# Patient Record
Sex: FEMALE | Race: WHITE | NOT HISPANIC OR LATINO | Employment: OTHER | URBAN - METROPOLITAN AREA
[De-identification: names, ages, dates, MRNs, and addresses within clinical notes are randomized per-mention and may not be internally consistent; named-entity substitution may affect disease eponyms.]

---

## 2019-04-13 PROBLEM — G45.9 TRANSIENT ISCHEMIC ATTACK: Status: ACTIVE | Noted: 2019-04-13

## 2019-04-13 PROBLEM — G43.109 COMPLICATED MIGRAINE: Status: ACTIVE | Noted: 2019-04-13

## 2019-09-19 ENCOUNTER — OFFICE VISIT (OUTPATIENT)
Dept: ORTHOPEDICS | Facility: CLINIC | Age: 64
End: 2019-09-19
Payer: COMMERCIAL

## 2019-09-19 VITALS
HEIGHT: 62 IN | SYSTOLIC BLOOD PRESSURE: 124 MMHG | BODY MASS INDEX: 28.52 KG/M2 | DIASTOLIC BLOOD PRESSURE: 60 MMHG | WEIGHT: 155 LBS | HEART RATE: 75 BPM

## 2019-09-19 DIAGNOSIS — M25.561 ACUTE PAIN OF RIGHT KNEE: ICD-10-CM

## 2019-09-19 DIAGNOSIS — M22.41 CHONDROMALACIA OF RIGHT PATELLA: Primary | ICD-10-CM

## 2019-09-19 PROCEDURE — 20610 DRAIN/INJ JOINT/BURSA W/O US: CPT | Mod: RT,S$GLB,, | Performed by: ORTHOPAEDIC SURGERY

## 2019-09-19 PROCEDURE — 99203 PR OFFICE/OUTPT VISIT, NEW, LEVL III, 30-44 MIN: ICD-10-PCS | Mod: 25,S$GLB,, | Performed by: ORTHOPAEDIC SURGERY

## 2019-09-19 PROCEDURE — 20610 LARGE JOINT ASPIRATION/INJECTION: R KNEE: ICD-10-PCS | Mod: RT,S$GLB,, | Performed by: ORTHOPAEDIC SURGERY

## 2019-09-19 PROCEDURE — 99203 OFFICE O/P NEW LOW 30 MIN: CPT | Mod: 25,S$GLB,, | Performed by: ORTHOPAEDIC SURGERY

## 2019-09-19 RX ORDER — ASPIRIN 81 MG/1
81 TABLET ORAL DAILY
COMMUNITY
End: 2022-06-02

## 2019-09-19 RX ORDER — METHYLPREDNISOLONE ACETATE 40 MG/ML
40 INJECTION, SUSPENSION INTRA-ARTICULAR; INTRALESIONAL; INTRAMUSCULAR; SOFT TISSUE
Status: DISCONTINUED | OUTPATIENT
Start: 2019-09-19 | End: 2019-09-19 | Stop reason: HOSPADM

## 2019-09-19 RX ORDER — EPINEPHRINE 0.22MG
100 AEROSOL WITH ADAPTER (ML) INHALATION DAILY
COMMUNITY
End: 2022-06-02

## 2019-09-19 RX ADMIN — METHYLPREDNISOLONE ACETATE 40 MG: 40 INJECTION, SUSPENSION INTRA-ARTICULAR; INTRALESIONAL; INTRAMUSCULAR; SOFT TISSUE at 02:09

## 2019-09-19 NOTE — PROGRESS NOTES
Formerly [facility]    Subjective:     Chief Complaint:   Chief Complaint   Patient presents with    Right Knee - Pain     Right knee pain x a while. States that her knee feel like to wants to give out on her and has sharp pains depending on how she moves her legs, pain also is off and on and does bother her to walk up stairs. Would like to get an injection.        Past Medical History:   Diagnosis Date    Allergy        Past Surgical History:   Procedure Laterality Date    TONSILLECTOMY      TUBAL LIGATION         Current Outpatient Medications   Medication Sig    aspirin (ECOTRIN) 81 MG EC tablet Take 81 mg by mouth once daily.    coenzyme Q10 (CO Q-10) 100 mg capsule Take 100 mg by mouth once daily.    docosahexanoic acid/epa (FISH OIL ORAL) Take by mouth.     No current facility-administered medications for this visit.        Review of patient's allergies indicates:   Allergen Reactions    Pcn [penicillins] Hives and Nausea Only       Family History   Problem Relation Age of Onset    Cancer Mother 42        breast cancer    Heart disease Father         quadrouple bypass    Heart disease Maternal Grandmother     COPD Maternal Grandfather     Heart disease Paternal Grandmother        Social History     Socioeconomic History    Marital status: Single     Spouse name: Not on file    Number of children: Not on file    Years of education: Not on file    Highest education level: Not on file   Occupational History    Not on file   Social Needs    Financial resource strain: Not on file    Food insecurity:     Worry: Not on file     Inability: Not on file    Transportation needs:     Medical: Not on file     Non-medical: Not on file   Tobacco Use    Smoking status: Never Smoker    Smokeless tobacco: Never Used   Substance and Sexual Activity    Alcohol use: Yes     Comment: 2 glasses of red wine daily    Drug use: No    Sexual activity: Not on file   Lifestyle    Physical activity:     Days  per week: Not on file     Minutes per session: Not on file    Stress: Not on file   Relationships    Social connections:     Talks on phone: Not on file     Gets together: Not on file     Attends Jew service: Not on file     Active member of club or organization: Not on file     Attends meetings of clubs or organizations: Not on file     Relationship status: Not on file   Other Topics Concern    Not on file   Social History Narrative    Not on file       History of present illness: Patient comes in today for the right knee.. She's had right knee pain for about a month. She's not sure exactly how or why it started.      Review of Systems:    Constitution: Negative for chills, fever, and sweats.  Negative for unexplained weight loss.    HENT:  Negative for headaches and blurry vision.    Cardiovascular:Negative for chest pain or irregular heart beat. Negative for hypertension.    Respiratory:  Negative for cough and shortness of breath.    Gastrointestinal: Negative for abdominal pain, heartburn, melena, nausea, and vomitting.    Genitourinary:  Negative bladder incontinence and dysuria.    Musculoskeletal:  See HPI for details.     Neurological: Negative for numbness.    Psychiatric/Behavioral: Negative for depression.  The patient is not nervous/anxious.      Endocrine: Negative for polyuria    Hematologic/Lymphatic: Negative for bleeding problem.  Does not bruise/bleed easily.    Skin: Negative for poor would healing and rash    Objective:      Physical Examination:    Vital Signs:    Vitals:    09/19/19 1314   BP: 124/60   Pulse: 75       Body mass index is 28.35 kg/m².    This a well-developed, well nourished patient in no acute distress.  They are alert and oriented and cooperative to examination.        Patient has anterior crepitus in the right side. She is 1+ effusion. Her knee is stable to varus valgus anterior posterior stresses.  Pertinent New Results:    XRAY Report / Interpretation:   AP  lateral and sunrise views of the right knee demonstrate moderate to severe patellofemoral arthrosis. No fractures or subluxations    Assessment/Plan:      Right knee  arthritis patellofemoral. I injected her with Depo-Medrol and lidocaine. She will follow-up in 6 weeks      This note was created using Dragon voice recognition software that occasionally misinterpreted phrases or words.

## 2019-09-19 NOTE — PROCEDURES
Large Joint Aspiration/Injection: R knee  Date/Time: 9/19/2019 2:05 PM  Performed by: Pasha Ontiveros MD  Authorized by: Pasha Ontiveros MD     Consent Done?:  Yes (Verbal)  Indications:  Pain  Procedure site marked: Yes    Timeout: Prior to procedure the correct patient, procedure, and site was verified    Anesthesia  Local anesthesia used  Anesthetic: lidocaine 1% without epinephrine    Location:  Knee  Site:  R knee  Prep: Patient was prepped and draped in usual sterile fashion    Needle size:  25 G  Medications:  40 mg methylPREDNISolone acetate 40 mg/mL; 40 mg methylPREDNISolone acetate 40 mg/mL  Patient tolerance:  Patient tolerated the procedure well with no immediate complications

## 2019-10-02 ENCOUNTER — IMMUNIZATION (OUTPATIENT)
Dept: URGENT CARE | Facility: CLINIC | Age: 64
End: 2019-10-02
Payer: COMMERCIAL

## 2019-10-02 PROCEDURE — 90686 IIV4 VACC NO PRSV 0.5 ML IM: CPT | Mod: S$GLB,,, | Performed by: EMERGENCY MEDICINE

## 2019-10-02 PROCEDURE — 90471 IMMUNIZATION ADMIN: CPT | Mod: S$GLB,,, | Performed by: EMERGENCY MEDICINE

## 2019-10-02 PROCEDURE — 90686 PR FLU VACCINE, QIIV4, NO PRSV, 0.5 ML, IM: ICD-10-PCS | Mod: S$GLB,,, | Performed by: EMERGENCY MEDICINE

## 2019-10-02 PROCEDURE — 90471 PR IMMUNIZ ADMIN,1 SINGLE/COMB VAC/TOXOID: ICD-10-PCS | Mod: S$GLB,,, | Performed by: EMERGENCY MEDICINE

## 2019-11-18 ENCOUNTER — OFFICE VISIT (OUTPATIENT)
Dept: FAMILY MEDICINE | Facility: CLINIC | Age: 64
End: 2019-11-18
Payer: COMMERCIAL

## 2019-11-18 VITALS
BODY MASS INDEX: 27.49 KG/M2 | WEIGHT: 161 LBS | HEIGHT: 64 IN | HEART RATE: 80 BPM | DIASTOLIC BLOOD PRESSURE: 82 MMHG | SYSTOLIC BLOOD PRESSURE: 138 MMHG

## 2019-11-18 DIAGNOSIS — G43.109 COMPLICATED MIGRAINE: ICD-10-CM

## 2019-11-18 DIAGNOSIS — Z12.31 OTHER SCREENING MAMMOGRAM: ICD-10-CM

## 2019-11-18 DIAGNOSIS — M17.11 PRIMARY OSTEOARTHRITIS OF RIGHT KNEE: ICD-10-CM

## 2019-11-18 DIAGNOSIS — F51.01 PRIMARY INSOMNIA: Primary | ICD-10-CM

## 2019-11-18 PROBLEM — M19.91 PRIMARY OSTEOARTHRITIS: Status: ACTIVE | Noted: 2018-05-07

## 2019-11-18 PROCEDURE — 99214 OFFICE O/P EST MOD 30 MIN: CPT | Mod: S$GLB,,, | Performed by: FAMILY MEDICINE

## 2019-11-18 PROCEDURE — 99214 PR OFFICE/OUTPT VISIT, EST, LEVL IV, 30-39 MIN: ICD-10-PCS | Mod: S$GLB,,, | Performed by: FAMILY MEDICINE

## 2019-11-18 RX ORDER — CLONAZEPAM 0.5 MG/1
0.5 TABLET ORAL NIGHTLY
Qty: 30 TABLET | Refills: 0 | Status: SHIPPED | OUTPATIENT
Start: 2019-11-18 | End: 2020-01-13 | Stop reason: SDUPTHER

## 2019-11-18 NOTE — PROGRESS NOTES
SUBJECTIVE:    Patient ID: Elle Hernandez is a 64 y.o. female.    Chief Complaint: Follow-up (did not bring bottles, not on meds -ac)    64 year old female presents for routine exam. Pt recently went to the ER in April for floaters in vision and slurred speech. Workup was negative. F/u with neurology Dr. Minaya determined to most likely be complicated migraine. She also reports experiencing pains in her lower legs after starting Crestor. She has been off of Crestor for several months and is still having leg pain. She c/o having difficulty with sleep. otc meds have helped her fall asleep, but not stay asleep. She deals with dry mouth at night and her  also keeps her awake with frequently night time coughing. She is requesting something stronger to help her sleep.  Has recently started new exercises classes and tries to go about 3-4 times per week.      Hospital Outpatient Visit on 07/12/2019   Component Date Value Ref Range Status    LVIDS 07/12/2019 2.76  2.1 - 4.0 cm Final    Ascending aorta 07/12/2019 2.8  cm Final    STJ 07/12/2019 3.08  cm Final    AV mean gradient 07/12/2019 3  mmHg Final    Ao peak davis 07/12/2019 1.24  m/s Final    Ao VTI 07/12/2019 25.5  cm Final    IVS 07/12/2019 0.903  0.6 - 1.1 cm Final    LA size 07/12/2019 3.2  cm Final    LVIDD 07/12/2019 4.32  3.5 - 6.0 cm Final    LVOT diameter 07/12/2019 2.0  cm Final    LVOT peak VTI 07/12/2019 22.80  cm Final    PW 07/12/2019 0.980  0.6 - 1.1 cm Final    MV Peak A Davis 07/12/2019 0.67  m/s Final    E wave decelartion time 07/12/2019 197  msec Final    RA Major Axis 07/12/2019 3.38  cm Final    RA Width 07/12/2019 2.89  cm Final    TR Max Davis 07/12/2019 2.08  m/s Final    RVDD 07/12/2019 2.28  cm Final    LA WIDTH 07/12/2019 3.05  cm Final    PV PEAK VELOCITY 07/12/2019 100  cm/s Final    LVOT peak davis 07/12/2019 97.00  m/s Final    BSA 07/12/2019 1.83  m2 Final    TDI SEPTAL 07/12/2019 0.10  m/s Final    LV  LATERAL E/E' RATIO 07/12/2019 6.50  m/s Final    LV SEPTAL E/E' RATIO 07/12/2019 7.80  m/s Final    TDI LATERAL 07/12/2019 0.12  m/s Final    FS 07/12/2019 36  28 - 44 % Final    LA volume 07/12/2019 34.36  cm3 Final    LV mass 07/12/2019 132.10  g Final    Left Ventricle Relative Wall Thick* 07/12/2019 0.45  cm Final    AV valve area 07/12/2019 2.81  cm2 Final    AV Velocity Ratio 07/12/2019 78.23   Final    AV index (prosthetic) 07/12/2019 0.89   Final    E/A ratio 07/12/2019 1.16   Final    Mean e' 07/12/2019 0.11  m/s Final    LVOT area 07/12/2019 3.1  cm2 Final    LVOT stroke volume 07/12/2019 71.59  cm3 Final    AV peak gradient 07/12/2019 6  mmHg Final    E/E' ratio 07/12/2019 7.09  m/s Final    MV Peak E Serafin 07/12/2019 0.78  m/s Final    LA Volume Index 07/12/2019 19.4  mL/m2 Final    LV Mass Index 07/12/2019 74  g/m2 Final    Left Atrium Minor Axis 07/12/2019 4.58  cm Final    Left Atrium Major Axis 07/12/2019 3.78  cm Final    Triscuspid Valve Regurgitation Pea* 07/12/2019 17  mmHg Final    MV valve area p 1/2 method 07/12/2019 3.19  cm2 Final    MV stenosis pressure 1/2 time 07/12/2019 69  ms Final    Right Atrial Pressure (from IVC) 07/12/2019 3  mmHg Final    TV rest pulmonary artery pressure 07/12/2019 20  mmHg Final       Past Medical History:   Diagnosis Date    Allergy      Past Surgical History:   Procedure Laterality Date    TONSILLECTOMY      TUBAL LIGATION       Family History   Problem Relation Age of Onset    Cancer Mother 42        breast cancer    Heart disease Father         quadrouple bypass    Heart disease Maternal Grandmother     COPD Maternal Grandfather     Heart disease Paternal Grandmother        Marital Status: Single  Alcohol History:  reports that she drinks alcohol.  Tobacco History:  reports that she is a non-smoker but has been exposed to tobacco smoke. She has never used smokeless tobacco.  Drug History:  reports that she does not use  drugs.    Review of patient's allergies indicates:   Allergen Reactions    Pcn [penicillins] Hives and Nausea Only       Current Outpatient Medications:     aspirin (ECOTRIN) 81 MG EC tablet, Take 81 mg by mouth once daily., Disp: , Rfl:     coenzyme Q10 (CO Q-10) 100 mg capsule, Take 100 mg by mouth once daily., Disp: , Rfl:     docosahexanoic acid/epa (FISH OIL ORAL), Take by mouth., Disp: , Rfl:     Review of Systems   Constitutional: Negative for appetite change, chills, fatigue, fever and unexpected weight change.   HENT: Negative for congestion, ear pain, sinus pain, sore throat and trouble swallowing.    Eyes: Negative for pain, discharge and visual disturbance.   Respiratory: Negative for apnea, cough, shortness of breath and wheezing.    Cardiovascular: Negative for chest pain, palpitations and leg swelling.   Gastrointestinal: Negative for abdominal pain, blood in stool, constipation, diarrhea, nausea and vomiting.   Endocrine: Negative for heat intolerance, polydipsia and polyuria.   Genitourinary: Negative for difficulty urinating, dyspareunia, dysuria, frequency, hematuria and menstrual problem.   Musculoskeletal: Positive for arthralgias (R knee pain. Steroid injection by Dr. Ontiveros in Sept has helped.). Negative for back pain, gait problem, joint swelling and myalgias.        Reports intermittent pains in lower legs and feet.    Allergic/Immunologic: Negative for environmental allergies, food allergies and immunocompromised state.   Neurological: Negative for dizziness, tremors, seizures, numbness and headaches.        No more migraines since episode in April of this yr   Psychiatric/Behavioral: Positive for sleep disturbance (wakes up frequently throughout the night. Thinks it is due to  coughing frequently at night. ). Negative for behavioral problems, confusion, hallucinations and suicidal ideas. The patient is not nervous/anxious.           Objective:      Vitals:    11/18/19 1443   BP:  "138/82   Pulse: 80   Weight: 73 kg (161 lb)   Height: 5' 4" (1.626 m)     Body mass index is 27.64 kg/m².  Physical Exam   Constitutional: She is oriented to person, place, and time. She appears well-developed and well-nourished.   HENT:   Head: Normocephalic and atraumatic.   Right Ear: External ear normal.   Left Ear: External ear normal.   Nose: Nose normal.   Mouth/Throat: Oropharynx is clear and moist.   Eyes: Pupils are equal, round, and reactive to light. EOM are normal.   Neck: Normal range of motion. Neck supple. Carotid bruit is not present. No thyromegaly present.   Cardiovascular: Normal rate, regular rhythm, normal heart sounds and intact distal pulses.   No murmur heard.  Pulmonary/Chest: Effort normal and breath sounds normal. She has no wheezes. She has no rales.   Abdominal: Soft. Bowel sounds are normal. She exhibits no distension. There is no hepatosplenomegaly. There is no tenderness.   Musculoskeletal: Normal range of motion. She exhibits no tenderness or deformity.        Lumbar back: Normal. She exhibits no pain and no spasm.   Bends 90 degrees at  waist   Lymphadenopathy:     She has no cervical adenopathy.   Neurological: She is alert and oriented to person, place, and time. No cranial nerve deficit. Coordination normal.   Skin: Skin is warm and dry. No rash noted.   Psychiatric: She has a normal mood and affect. Her behavior is normal. Judgment and thought content normal.   Nursing note and vitals reviewed.        Assessment:       1. Primary insomnia    2. Complicated migraine    3. Other screening mammogram    4. Primary osteoarthritis of right knee         Plan:       Primary insomnia  - Clonazepam 0.5mg nightly at bedtime.    Complicated migraine  - Saw Dr. Minaya. No more episodes.    Other screening mammogram  -     Mammo Digital Screening Bilat without CA; Future    Primary osteoarthritis of right knee  - Seeing Dr. Ontiveros. Steroid injection in September doing well.    Follow up in " about 6 months (around 5/18/2020).

## 2020-01-13 DIAGNOSIS — F51.01 PRIMARY INSOMNIA: ICD-10-CM

## 2020-01-13 RX ORDER — CLONAZEPAM 0.5 MG/1
0.5 TABLET ORAL NIGHTLY
Qty: 30 TABLET | Refills: 2 | Status: SHIPPED | OUTPATIENT
Start: 2020-01-13 | End: 2021-04-19

## 2020-01-13 NOTE — TELEPHONE ENCOUNTER
----- Message from Andrea Wright sent at 1/13/2020  9:20 AM CST -----  Contact: Nazanin Hernandez  Needs refill on her clonazepam 0.5Mg   Send to [facility]  Pt# [phone]

## 2020-01-15 ENCOUNTER — TELEPHONE (OUTPATIENT)
Dept: FAMILY MEDICINE | Facility: CLINIC | Age: 65
End: 2020-01-15

## 2020-01-15 DIAGNOSIS — R92.8 ABNORMAL MAMMOGRAM: Primary | ICD-10-CM

## 2020-01-15 NOTE — TELEPHONE ENCOUNTER
Spoke with pt and let her know that she needs additional imaging. Left side needs DI mammogram and u/s. Pt agrees to call and get scheduled. Orders in .Remind me created for 2 weeks.

## 2020-01-15 NOTE — TELEPHONE ENCOUNTER
----- Message from Michela Gray sent at 1/14/2020  4:08 PM CST -----  RADIOLOGY, SOCOINEREYDA VYAS, 01/07/20

## 2020-01-16 ENCOUNTER — TELEPHONE (OUTPATIENT)
Dept: FAMILY MEDICINE | Facility: CLINIC | Age: 65
End: 2020-01-16

## 2020-01-16 NOTE — TELEPHONE ENCOUNTER
----- Message from Jyoti Augustin sent at 1/16/2020  2:35 PM CST -----  vm- patient calling stating that we sent orders to [facility] for a sonogram for her left breast she is asking that it is sent to [facility] instead [phone]

## 2020-02-19 ENCOUNTER — TELEPHONE (OUTPATIENT)
Dept: FAMILY MEDICINE | Facility: CLINIC | Age: 65
End: 2020-02-19

## 2020-02-19 NOTE — TELEPHONE ENCOUNTER
There was a remind me making sure that patient had her mammogram done at [facility]. I do see where it was done and is scanned under media, but can't tell if we ever called with those results? I don't see a telephone encounter where we called her.

## 2020-06-09 DIAGNOSIS — Z20.822 CLOSE EXPOSURE TO COVID-19 VIRUS: Primary | ICD-10-CM

## 2020-09-25 ENCOUNTER — IMMUNIZATION (OUTPATIENT)
Dept: URGENT CARE | Facility: CLINIC | Age: 65
End: 2020-09-25
Payer: COMMERCIAL

## 2020-09-25 DIAGNOSIS — Z23 NEED FOR PROPHYLACTIC VACCINATION AND INOCULATION AGAINST CHOLERA ALONE: ICD-10-CM

## 2020-09-25 PROCEDURE — 90471 IMMUNIZATION ADMIN: CPT | Mod: S$GLB,,, | Performed by: EMERGENCY MEDICINE

## 2020-09-25 PROCEDURE — 90694 PR FLU VACCINE, QAIIV, INACTIV, NO PRSV, 0.5 ML, IM: ICD-10-PCS | Mod: S$GLB,,, | Performed by: EMERGENCY MEDICINE

## 2020-09-25 PROCEDURE — 90694 VACC AIIV4 NO PRSRV 0.5ML IM: CPT | Mod: S$GLB,,, | Performed by: EMERGENCY MEDICINE

## 2020-09-25 PROCEDURE — 90471 PR IMMUNIZ ADMIN,1 SINGLE/COMB VAC/TOXOID: ICD-10-PCS | Mod: S$GLB,,, | Performed by: EMERGENCY MEDICINE

## 2021-02-11 ENCOUNTER — TELEPHONE (OUTPATIENT)
Dept: FAMILY MEDICINE | Facility: CLINIC | Age: 66
End: 2021-02-11

## 2021-02-11 DIAGNOSIS — Z79.899 ENCOUNTER FOR LONG-TERM (CURRENT) USE OF OTHER MEDICATIONS: ICD-10-CM

## 2021-02-11 DIAGNOSIS — F51.01 PRIMARY INSOMNIA: ICD-10-CM

## 2021-02-11 DIAGNOSIS — Z00.00 ROUTINE GENERAL MEDICAL EXAMINATION AT A HEALTH CARE FACILITY: Primary | ICD-10-CM

## 2021-03-04 ENCOUNTER — OFFICE VISIT (OUTPATIENT)
Dept: FAMILY MEDICINE | Facility: CLINIC | Age: 66
End: 2021-03-04
Payer: COMMERCIAL

## 2021-03-04 VITALS
WEIGHT: 150 LBS | DIASTOLIC BLOOD PRESSURE: 80 MMHG | BODY MASS INDEX: 25.61 KG/M2 | HEART RATE: 60 BPM | SYSTOLIC BLOOD PRESSURE: 130 MMHG | HEIGHT: 64 IN

## 2021-03-04 DIAGNOSIS — Z12.31 SCREENING MAMMOGRAM, ENCOUNTER FOR: Primary | ICD-10-CM

## 2021-03-04 DIAGNOSIS — S46.911A RIGHT SHOULDER STRAIN, INITIAL ENCOUNTER: ICD-10-CM

## 2021-03-04 PROCEDURE — 99213 OFFICE O/P EST LOW 20 MIN: CPT | Mod: S$GLB,,, | Performed by: NURSE PRACTITIONER

## 2021-03-04 PROCEDURE — 99213 PR OFFICE/OUTPT VISIT, EST, LEVL III, 20-29 MIN: ICD-10-PCS | Mod: S$GLB,,, | Performed by: NURSE PRACTITIONER

## 2021-03-04 RX ORDER — IBUPROFEN 800 MG/1
800 TABLET ORAL 3 TIMES DAILY
Qty: 30 TABLET | Refills: 1 | Status: SHIPPED | OUTPATIENT
Start: 2021-03-04 | End: 2022-06-02

## 2021-03-04 RX ORDER — METHOCARBAMOL 750 MG/1
750 TABLET, FILM COATED ORAL 4 TIMES DAILY
Qty: 40 TABLET | Refills: 1 | Status: SHIPPED | OUTPATIENT
Start: 2021-03-04 | End: 2021-03-14

## 2021-03-04 RX ORDER — METHYLPREDNISOLONE 4 MG/1
TABLET ORAL
Qty: 1 PACKAGE | Refills: 0 | Status: SHIPPED | OUTPATIENT
Start: 2021-03-04 | End: 2021-03-25

## 2021-03-31 ENCOUNTER — TELEPHONE (OUTPATIENT)
Dept: FAMILY MEDICINE | Facility: CLINIC | Age: 66
End: 2021-03-31

## 2021-04-09 ENCOUNTER — TELEPHONE (OUTPATIENT)
Dept: FAMILY MEDICINE | Facility: CLINIC | Age: 66
End: 2021-04-09

## 2021-04-14 LAB
ALBUMIN SERPL-MCNC: 4.6 G/DL (ref 3.6–5.1)
ALBUMIN/GLOB SERPL: 2 (CALC) (ref 1–2.5)
ALP SERPL-CCNC: 82 U/L (ref 37–153)
ALT SERPL-CCNC: 16 U/L (ref 6–29)
APPEARANCE UR: CLEAR
AST SERPL-CCNC: 18 U/L (ref 10–35)
BACTERIA #/AREA URNS HPF: NORMAL /HPF
BACTERIA UR CULT: NORMAL
BASOPHILS # BLD AUTO: 38 CELLS/UL (ref 0–200)
BASOPHILS NFR BLD AUTO: 0.6 %
BILIRUB SERPL-MCNC: 0.5 MG/DL (ref 0.2–1.2)
BILIRUB UR QL STRIP: NEGATIVE
BUN SERPL-MCNC: 13 MG/DL (ref 7–25)
BUN/CREAT SERPL: ABNORMAL (CALC) (ref 6–22)
CALCIUM SERPL-MCNC: 9.3 MG/DL (ref 8.6–10.4)
CHLORIDE SERPL-SCNC: 104 MMOL/L (ref 98–110)
CHOLEST SERPL-MCNC: 231 MG/DL
CHOLEST/HDLC SERPL: 3.2 (CALC)
CO2 SERPL-SCNC: 28 MMOL/L (ref 20–32)
COLOR UR: YELLOW
CREAT SERPL-MCNC: 0.9 MG/DL (ref 0.5–0.99)
EOSINOPHIL # BLD AUTO: 160 CELLS/UL (ref 15–500)
EOSINOPHIL NFR BLD AUTO: 2.5 %
ERYTHROCYTE [DISTWIDTH] IN BLOOD BY AUTOMATED COUNT: 13 % (ref 11–15)
GLOBULIN SER CALC-MCNC: 2.3 G/DL (CALC) (ref 1.9–3.7)
GLUCOSE SERPL-MCNC: 110 MG/DL (ref 65–99)
GLUCOSE UR QL STRIP: NEGATIVE
HCT VFR BLD AUTO: 42.4 % (ref 35–45)
HDLC SERPL-MCNC: 73 MG/DL
HGB BLD-MCNC: 14.3 G/DL (ref 11.7–15.5)
HGB UR QL STRIP: NEGATIVE
HYALINE CASTS #/AREA URNS LPF: NORMAL /LPF
KETONES UR QL STRIP: NEGATIVE
LDLC SERPL CALC-MCNC: 139 MG/DL (CALC)
LEUKOCYTE ESTERASE UR QL STRIP: NEGATIVE
LYMPHOCYTES # BLD AUTO: 2445 CELLS/UL (ref 850–3900)
LYMPHOCYTES NFR BLD AUTO: 38.2 %
MCH RBC QN AUTO: 30.5 PG (ref 27–33)
MCHC RBC AUTO-ENTMCNC: 33.7 G/DL (ref 32–36)
MCV RBC AUTO: 90.4 FL (ref 80–100)
MONOCYTES # BLD AUTO: 352 CELLS/UL (ref 200–950)
MONOCYTES NFR BLD AUTO: 5.5 %
NEUTROPHILS # BLD AUTO: 3405 CELLS/UL (ref 1500–7800)
NEUTROPHILS NFR BLD AUTO: 53.2 %
NITRITE UR QL STRIP: NEGATIVE
NONHDLC SERPL-MCNC: 158 MG/DL (CALC)
PH UR STRIP: 5.5 [PH] (ref 5–8)
PLATELET # BLD AUTO: 326 THOUSAND/UL (ref 140–400)
PMV BLD REES-ECKER: 8.6 FL (ref 7.5–12.5)
POTASSIUM SERPL-SCNC: 4.8 MMOL/L (ref 3.5–5.3)
PROT SERPL-MCNC: 6.9 G/DL (ref 6.1–8.1)
PROT UR QL STRIP: NEGATIVE
RBC # BLD AUTO: 4.69 MILLION/UL (ref 3.8–5.1)
RBC #/AREA URNS HPF: NORMAL /HPF
SODIUM SERPL-SCNC: 141 MMOL/L (ref 135–146)
SP GR UR STRIP: 1.01 (ref 1–1.03)
SQUAMOUS #/AREA URNS HPF: NORMAL /HPF
TRIGL SERPL-MCNC: 91 MG/DL
TSH SERPL-ACNC: 1.9 MIU/L (ref 0.4–4.5)
WBC # BLD AUTO: 6.4 THOUSAND/UL (ref 3.8–10.8)
WBC #/AREA URNS HPF: NORMAL /HPF

## 2021-04-19 ENCOUNTER — OFFICE VISIT (OUTPATIENT)
Dept: FAMILY MEDICINE | Facility: CLINIC | Age: 66
End: 2021-04-19

## 2021-04-19 VITALS
WEIGHT: 149 LBS | SYSTOLIC BLOOD PRESSURE: 124 MMHG | BODY MASS INDEX: 25.44 KG/M2 | HEART RATE: 72 BPM | HEIGHT: 64 IN | DIASTOLIC BLOOD PRESSURE: 82 MMHG

## 2021-04-19 DIAGNOSIS — K21.9 GASTROESOPHAGEAL REFLUX DISEASE WITHOUT ESOPHAGITIS: ICD-10-CM

## 2021-04-19 DIAGNOSIS — E78.2 MIXED HYPERLIPIDEMIA: ICD-10-CM

## 2021-04-19 DIAGNOSIS — Z12.31 OTHER SCREENING MAMMOGRAM: ICD-10-CM

## 2021-04-19 DIAGNOSIS — R07.89 CHEST WALL PAIN: ICD-10-CM

## 2021-04-19 DIAGNOSIS — S46.911D RIGHT SHOULDER STRAIN, SUBSEQUENT ENCOUNTER: ICD-10-CM

## 2021-04-19 DIAGNOSIS — Z00.00 WELLNESS EXAMINATION: Primary | ICD-10-CM

## 2021-04-19 PROCEDURE — 99396 PR PREVENTIVE VISIT,EST,40-64: ICD-10-PCS | Mod: S$GLB,,, | Performed by: FAMILY MEDICINE

## 2021-04-19 PROCEDURE — 99396 PREV VISIT EST AGE 40-64: CPT | Mod: S$GLB,,, | Performed by: FAMILY MEDICINE

## 2021-10-22 ENCOUNTER — TELEPHONE (OUTPATIENT)
Dept: FAMILY MEDICINE | Facility: CLINIC | Age: 66
End: 2021-10-22

## 2021-10-26 ENCOUNTER — TELEPHONE (OUTPATIENT)
Dept: FAMILY MEDICINE | Facility: CLINIC | Age: 66
End: 2021-10-26
Payer: MEDICARE

## 2021-11-01 ENCOUNTER — OFFICE VISIT (OUTPATIENT)
Dept: FAMILY MEDICINE | Facility: CLINIC | Age: 66
End: 2021-11-01
Payer: MEDICARE

## 2021-11-01 VITALS
WEIGHT: 156 LBS | SYSTOLIC BLOOD PRESSURE: 126 MMHG | HEIGHT: 64 IN | BODY MASS INDEX: 26.63 KG/M2 | DIASTOLIC BLOOD PRESSURE: 82 MMHG | HEART RATE: 80 BPM

## 2021-11-01 DIAGNOSIS — F51.01 PRIMARY INSOMNIA: Primary | ICD-10-CM

## 2021-11-01 PROCEDURE — 1101F PT FALLS ASSESS-DOCD LE1/YR: CPT | Mod: S$GLB,,, | Performed by: PHYSICIAN ASSISTANT

## 2021-11-01 PROCEDURE — 3079F DIAST BP 80-89 MM HG: CPT | Mod: S$GLB,,, | Performed by: PHYSICIAN ASSISTANT

## 2021-11-01 PROCEDURE — 3008F BODY MASS INDEX DOCD: CPT | Mod: S$GLB,,, | Performed by: PHYSICIAN ASSISTANT

## 2021-11-01 PROCEDURE — 3008F PR BODY MASS INDEX (BMI) DOCUMENTED: ICD-10-PCS | Mod: S$GLB,,, | Performed by: PHYSICIAN ASSISTANT

## 2021-11-01 PROCEDURE — 3288F PR FALLS RISK ASSESSMENT DOCUMENTED: ICD-10-PCS | Mod: S$GLB,,, | Performed by: PHYSICIAN ASSISTANT

## 2021-11-01 PROCEDURE — 99213 OFFICE O/P EST LOW 20 MIN: CPT | Mod: S$GLB,,, | Performed by: PHYSICIAN ASSISTANT

## 2021-11-01 PROCEDURE — 3079F PR MOST RECENT DIASTOLIC BLOOD PRESSURE 80-89 MM HG: ICD-10-PCS | Mod: S$GLB,,, | Performed by: PHYSICIAN ASSISTANT

## 2021-11-01 PROCEDURE — 3288F FALL RISK ASSESSMENT DOCD: CPT | Mod: S$GLB,,, | Performed by: PHYSICIAN ASSISTANT

## 2021-11-01 PROCEDURE — 99213 PR OFFICE/OUTPT VISIT, EST, LEVL III, 20-29 MIN: ICD-10-PCS | Mod: S$GLB,,, | Performed by: PHYSICIAN ASSISTANT

## 2021-11-01 PROCEDURE — 3074F PR MOST RECENT SYSTOLIC BLOOD PRESSURE < 130 MM HG: ICD-10-PCS | Mod: S$GLB,,, | Performed by: PHYSICIAN ASSISTANT

## 2021-11-01 PROCEDURE — 1101F PR PT FALLS ASSESS DOC 0-1 FALLS W/OUT INJ PAST YR: ICD-10-PCS | Mod: S$GLB,,, | Performed by: PHYSICIAN ASSISTANT

## 2021-11-01 PROCEDURE — 3074F SYST BP LT 130 MM HG: CPT | Mod: S$GLB,,, | Performed by: PHYSICIAN ASSISTANT

## 2021-11-01 RX ORDER — SUVOREXANT 10 MG/1
10 TABLET, FILM COATED ORAL NIGHTLY
Qty: 10 TABLET | Refills: 0 | Status: SHIPPED | OUTPATIENT
Start: 2021-11-01 | End: 2021-11-11

## 2021-11-02 ENCOUNTER — TELEPHONE (OUTPATIENT)
Dept: FAMILY MEDICINE | Facility: CLINIC | Age: 66
End: 2021-11-02
Payer: MEDICARE

## 2021-11-11 ENCOUNTER — PATIENT MESSAGE (OUTPATIENT)
Dept: FAMILY MEDICINE | Facility: CLINIC | Age: 66
End: 2021-11-11
Payer: MEDICARE

## 2021-11-18 ENCOUNTER — TELEPHONE (OUTPATIENT)
Dept: FAMILY MEDICINE | Facility: CLINIC | Age: 66
End: 2021-11-18
Payer: MEDICARE

## 2021-11-18 DIAGNOSIS — F51.01 PRIMARY INSOMNIA: Primary | ICD-10-CM

## 2021-11-18 RX ORDER — TEMAZEPAM 15 MG/1
15 CAPSULE ORAL NIGHTLY PRN
Qty: 30 CAPSULE | Refills: 1 | Status: SHIPPED | OUTPATIENT
Start: 2021-11-18 | End: 2022-01-17

## 2021-12-02 ENCOUNTER — OFFICE VISIT (OUTPATIENT)
Dept: FAMILY MEDICINE | Facility: CLINIC | Age: 66
End: 2021-12-02
Payer: MEDICARE

## 2021-12-02 VITALS — DIASTOLIC BLOOD PRESSURE: 78 MMHG | HEART RATE: 74 BPM | SYSTOLIC BLOOD PRESSURE: 124 MMHG

## 2021-12-02 DIAGNOSIS — Z00.00 ROUTINE PHYSICAL EXAMINATION: Primary | ICD-10-CM

## 2021-12-02 DIAGNOSIS — E78.2 MIXED HYPERLIPIDEMIA: ICD-10-CM

## 2021-12-02 DIAGNOSIS — F51.01 PRIMARY INSOMNIA: ICD-10-CM

## 2021-12-02 DIAGNOSIS — G43.109 COMPLICATED MIGRAINE: ICD-10-CM

## 2021-12-02 PROCEDURE — G0402 INITIAL PREVENTIVE EXAM: HCPCS | Mod: S$GLB,,, | Performed by: PHYSICIAN ASSISTANT

## 2021-12-02 PROCEDURE — G0402 PR WELCOME MEDICARE PREVENTIVE VISIT NEW ENROLLEE: ICD-10-PCS | Mod: S$GLB,,, | Performed by: PHYSICIAN ASSISTANT

## 2021-12-03 ENCOUNTER — TELEPHONE (OUTPATIENT)
Dept: FAMILY MEDICINE | Facility: CLINIC | Age: 66
End: 2021-12-03
Payer: MEDICARE

## 2021-12-03 DIAGNOSIS — G62.9 NEUROPATHY: Primary | ICD-10-CM

## 2021-12-03 RX ORDER — GABAPENTIN 100 MG/1
100 CAPSULE ORAL NIGHTLY
Qty: 30 CAPSULE | Refills: 2 | Status: SHIPPED | OUTPATIENT
Start: 2021-12-03 | End: 2022-01-06 | Stop reason: SDUPTHER

## 2022-01-03 ENCOUNTER — LAB VISIT (OUTPATIENT)
Dept: PRIMARY CARE CLINIC | Facility: OTHER | Age: 67
End: 2022-01-03
Attending: INTERNAL MEDICINE
Payer: MEDICARE

## 2022-01-03 ENCOUNTER — TELEPHONE (OUTPATIENT)
Dept: FAMILY MEDICINE | Facility: CLINIC | Age: 67
End: 2022-01-03
Payer: MEDICARE

## 2022-01-03 DIAGNOSIS — Z20.822 ENCOUNTER FOR LABORATORY TESTING FOR COVID-19 VIRUS: ICD-10-CM

## 2022-01-03 PROCEDURE — U0003 INFECTIOUS AGENT DETECTION BY NUCLEIC ACID (DNA OR RNA); SEVERE ACUTE RESPIRATORY SYNDROME CORONAVIRUS 2 (SARS-COV-2) (CORONAVIRUS DISEASE [COVID-19]), AMPLIFIED PROBE TECHNIQUE, MAKING USE OF HIGH THROUGHPUT TECHNOLOGIES AS DESCRIBED BY CMS-2020-01-R: HCPCS | Performed by: INTERNAL MEDICINE

## 2022-01-03 NOTE — TELEPHONE ENCOUNTER
Spoke to pt. Advised pt there are no appointments available. Advised pt to go to UC or go to COVID testing Clinic. She voiced understanding.

## 2022-01-03 NOTE — TELEPHONE ENCOUNTER
----- Message from Debbie Palomares sent at 1/3/2022  9:07 AM CST -----  Patient called and stated that she has the flu and she would like to have a zpack called into [facility] pharmacy if any questions please give her a call at [phone]

## 2022-01-03 NOTE — TELEPHONE ENCOUNTER
----- Message from Maya Leone sent at 1/3/2022  8:52 AM CST -----  Pt's  calling said he thinks his wife has Covid and wants to know can we order a swab test. Cb # [phone]  or [phone]

## 2022-01-04 ENCOUNTER — PATIENT MESSAGE (OUTPATIENT)
Dept: FAMILY MEDICINE | Facility: CLINIC | Age: 67
End: 2022-01-04
Payer: MEDICARE

## 2022-01-04 ENCOUNTER — PATIENT MESSAGE (OUTPATIENT)
Dept: ADMINISTRATIVE | Facility: OTHER | Age: 67
End: 2022-01-04
Payer: MEDICARE

## 2022-01-04 NOTE — TELEPHONE ENCOUNTER
Can anyone see her this week? I would feel better if someone evaluated before just sending medication in

## 2022-01-06 ENCOUNTER — PATIENT MESSAGE (OUTPATIENT)
Dept: FAMILY MEDICINE | Facility: CLINIC | Age: 67
End: 2022-01-06
Payer: MEDICARE

## 2022-01-06 ENCOUNTER — OFFICE VISIT (OUTPATIENT)
Dept: FAMILY MEDICINE | Facility: CLINIC | Age: 67
End: 2022-01-06
Payer: MEDICARE

## 2022-01-06 VITALS
OXYGEN SATURATION: 97 % | SYSTOLIC BLOOD PRESSURE: 114 MMHG | TEMPERATURE: 98 F | DIASTOLIC BLOOD PRESSURE: 80 MMHG | BODY MASS INDEX: 26.29 KG/M2 | HEIGHT: 64 IN | HEART RATE: 94 BPM | WEIGHT: 154 LBS

## 2022-01-06 DIAGNOSIS — G62.9 NEUROPATHY: ICD-10-CM

## 2022-01-06 DIAGNOSIS — R68.89 FLU-LIKE SYMPTOMS: ICD-10-CM

## 2022-01-06 DIAGNOSIS — J06.9 UPPER RESPIRATORY TRACT INFECTION, UNSPECIFIED TYPE: Primary | ICD-10-CM

## 2022-01-06 LAB — SARS-COV-2 RNA RESP QL NAA+PROBE: NOT DETECTED

## 2022-01-06 PROCEDURE — 3288F FALL RISK ASSESSMENT DOCD: CPT | Mod: S$GLB,,, | Performed by: PHYSICIAN ASSISTANT

## 2022-01-06 PROCEDURE — 1101F PT FALLS ASSESS-DOCD LE1/YR: CPT | Mod: S$GLB,,, | Performed by: PHYSICIAN ASSISTANT

## 2022-01-06 PROCEDURE — 1101F PR PT FALLS ASSESS DOC 0-1 FALLS W/OUT INJ PAST YR: ICD-10-PCS | Mod: S$GLB,,, | Performed by: PHYSICIAN ASSISTANT

## 2022-01-06 PROCEDURE — 3288F PR FALLS RISK ASSESSMENT DOCUMENTED: ICD-10-PCS | Mod: S$GLB,,, | Performed by: PHYSICIAN ASSISTANT

## 2022-01-06 PROCEDURE — 96372 PR INJECTION,THERAP/PROPH/DIAG2ST, IM OR SUBCUT: ICD-10-PCS | Mod: S$GLB,,, | Performed by: PHYSICIAN ASSISTANT

## 2022-01-06 PROCEDURE — 3008F PR BODY MASS INDEX (BMI) DOCUMENTED: ICD-10-PCS | Mod: S$GLB,,, | Performed by: PHYSICIAN ASSISTANT

## 2022-01-06 PROCEDURE — U0002 COVID-19 LAB TEST NON-CDC: HCPCS | Mod: QW,S$GLB,, | Performed by: PHYSICIAN ASSISTANT

## 2022-01-06 PROCEDURE — U0002: ICD-10-PCS | Mod: QW,S$GLB,, | Performed by: PHYSICIAN ASSISTANT

## 2022-01-06 PROCEDURE — 3008F BODY MASS INDEX DOCD: CPT | Mod: S$GLB,,, | Performed by: PHYSICIAN ASSISTANT

## 2022-01-06 PROCEDURE — 99213 OFFICE O/P EST LOW 20 MIN: CPT | Mod: 25,S$GLB,, | Performed by: PHYSICIAN ASSISTANT

## 2022-01-06 PROCEDURE — 99213 PR OFFICE/OUTPT VISIT, EST, LEVL III, 20-29 MIN: ICD-10-PCS | Mod: 25,S$GLB,, | Performed by: PHYSICIAN ASSISTANT

## 2022-01-06 PROCEDURE — 96372 THER/PROPH/DIAG INJ SC/IM: CPT | Mod: S$GLB,,, | Performed by: PHYSICIAN ASSISTANT

## 2022-01-06 RX ORDER — GABAPENTIN 300 MG/1
300 CAPSULE ORAL NIGHTLY
Qty: 30 CAPSULE | Refills: 2 | Status: SHIPPED | OUTPATIENT
Start: 2022-01-06 | End: 2022-08-11 | Stop reason: SDUPTHER

## 2022-01-06 RX ORDER — DEXAMETHASONE SODIUM PHOSPHATE 4 MG/ML
8 INJECTION, SOLUTION INTRA-ARTICULAR; INTRALESIONAL; INTRAMUSCULAR; INTRAVENOUS; SOFT TISSUE ONCE
Status: COMPLETED | OUTPATIENT
Start: 2022-01-06 | End: 2022-01-06

## 2022-01-06 RX ORDER — CODEINE PHOSPHATE AND GUAIFENESIN 10; 100 MG/5ML; MG/5ML
5 SOLUTION ORAL 3 TIMES DAILY PRN
Qty: 118 ML | Refills: 0 | Status: SHIPPED | OUTPATIENT
Start: 2022-01-06 | End: 2022-01-16

## 2022-01-06 RX ADMIN — DEXAMETHASONE SODIUM PHOSPHATE 8 MG: 4 INJECTION, SOLUTION INTRA-ARTICULAR; INTRALESIONAL; INTRAMUSCULAR; INTRAVENOUS; SOFT TISSUE at 04:01

## 2022-01-06 NOTE — PROGRESS NOTES
SUBJECTIVE:    Patient ID: Elle Hernandez is a 66 y.o. female.    Chief Complaint: flu symptoms (Sinus congestion, sore throat, cough with clear mucus, fatigue, loss of smell and taste//no med bottles//tc)    This is a 66-year-old female who presents today for flu symptoms.  Reports sinus congestion, sore throat, cough and clear mucus.  She is experiencing fatigue and just has a loss of smell and taste that started yesterday.  She has no sick contacts that she is aware of.  She was tested earlier this week at [facility], PCR send out with results coming back negative this morning.  Her rapid antigen test is negative for me as well.      Lab Visit on 01/03/2022   Component Date Value Ref Range Status    SARS-CoV2 (COVID-19) Qualitative P* 01/03/2022 Not Detected   Final       Past Medical History:   Diagnosis Date    Allergy     Migraine      Past Surgical History:   Procedure Laterality Date    facial biopsy Right 08/15/2021    right forehead biopsy result basal cell carcinoma    TONSILLECTOMY      TUBAL LIGATION       Family History   Problem Relation Age of Onset    Cancer Mother 42        breast cancer    Heart disease Father         quadrouple bypass    Heart disease Maternal Grandmother     COPD Maternal Grandfather     Heart disease Paternal Grandmother        Marital Status: Single  Alcohol History:  reports current alcohol use.  Tobacco History:  reports that she is a non-smoker but has been exposed to tobacco smoke. She has never used smokeless tobacco.  Drug History:  reports no history of drug use.    Review of patient's allergies indicates:   Allergen Reactions    Pcn [penicillins] Hives and Nausea Only       Current Outpatient Medications:     aspirin (ECOTRIN) 81 MG EC tablet, Take 81 mg by mouth once daily., Disp: , Rfl:     coenzyme Q10 (CO Q-10) 100 mg capsule, Take 100 mg by mouth once daily., Disp: , Rfl:     docosahexanoic acid/epa (FISH OIL ORAL), Take by mouth., Disp: ,  "Rfl:     gabapentin (NEURONTIN) 300 MG capsule, Take 1 capsule (300 mg total) by mouth every evening., Disp: 30 capsule, Rfl: 2    guaiFENesin-codeine 100-10 mg/5 ml (TUSSI-ORGANIDIN NR)  mg/5 mL syrup, Take 5 mLs by mouth 3 (three) times daily as needed., Disp: 118 mL, Rfl: 0    ibuprofen (ADVIL,MOTRIN) 800 MG tablet, Take 1 tablet (800 mg total) by mouth 3 (three) times daily., Disp: 30 tablet, Rfl: 1    temazepam (RESTORIL) 15 mg Cap, Take 1 capsule (15 mg total) by mouth nightly as needed (insomnia). (Patient not taking: Reported on 12/2/2021), Disp: 30 capsule, Rfl: 1    Review of Systems   Constitutional: Negative for chills, fatigue and fever.   HENT: Positive for sinus pressure and sinus pain. Negative for congestion, ear discharge, ear pain, rhinorrhea, sneezing, sore throat and trouble swallowing.    Eyes: Negative for pain, discharge, redness and itching.   Respiratory: Positive for cough. Negative for chest tightness and shortness of breath.    Cardiovascular: Negative for chest pain.   Gastrointestinal: Negative for abdominal distention and abdominal pain.   Neurological: Negative for weakness and headaches.          Objective:      Vitals:    01/06/22 1542   BP: 114/80   Pulse: 94   Temp: 97.8 °F (36.6 °C)   SpO2: 97%   Weight: 69.9 kg (154 lb)   Height: 5' 4" (1.626 m)     Physical Exam  Constitutional:       General: She is not in acute distress.     Appearance: She is well-developed and well-nourished.   HENT:      Head: Normocephalic and atraumatic.      Nose:      Right Turbinates: Enlarged and swollen.      Left Turbinates: Enlarged and swollen.   Eyes:      General:         Right eye: No discharge.         Left eye: No discharge.      Extraocular Movements: EOM normal.      Conjunctiva/sclera: Conjunctivae normal.      Pupils: Pupils are equal, round, and reactive to light.   Neck:      Thyroid: No thyromegaly.   Cardiovascular:      Rate and Rhythm: Normal rate and regular rhythm.     "  Heart sounds: Normal heart sounds.   Pulmonary:      Effort: Pulmonary effort is normal. No respiratory distress.      Breath sounds: Normal breath sounds. No wheezing.   Chest:      Chest wall: No tenderness.   Abdominal:      General: Bowel sounds are normal.      Palpations: Abdomen is soft.   Musculoskeletal:      Cervical back: Normal range of motion and neck supple.           Assessment:       1. Upper respiratory tract infection, unspecified type    2. Flu-like symptoms    3. Neuropathy         Plan:       Upper respiratory tract infection, unspecified type  Comments:  Suspect viral URI given 4 days of symptoms.  2 cc of Decadron in clinic.  Cheratussin sent for the cough.  Rest and push fluids.  If symptoms worsen please call  Orders:  -     guaiFENesin-codeine 100-10 mg/5 ml (TUSSI-ORGANIDIN NR)  mg/5 mL syrup; Take 5 mLs by mouth 3 (three) times daily as needed.  Dispense: 118 mL; Refill: 0    Flu-like symptoms  -     POCT COVID-19 Rapid Screening    Neuropathy  -     gabapentin (NEURONTIN) 300 MG capsule; Take 1 capsule (300 mg total) by mouth every evening.  Dispense: 30 capsule; Refill: 2      Follow up if symptoms worsen or fail to improve.        1/6/2022 Jorge Villeda PA-C

## 2022-01-06 NOTE — PATIENT INSTRUCTIONS
Patient Education       Peripheral Neuropathy   About this topic   Your nerves carry information to and from the brain. They also carry signals to and from the spinal cord. You have many nerves outside of your spinal cord. They are all a part of your peripheral nervous system. They work with your brain and spinal cord. All of these parts give your body information about senses, moving, and the environment. Damage to any of the nerves outside of your brain or spinal cord is peripheral neuropathy. What you feel and where it is will depend on what nerves are affected.  What are the causes?   Neuropathy can be caused by other health conditions or other medicines. Sometimes, the cause is not known.  What can make this more likely to happen?   · Having a health problem for a long period of time. These illnesses may include:  ? High blood sugar  ? Chronic kidney disease  ? Rheumatoid arthritis or lupus  · Drugs used to treat other diseases  · Toxins like heavy alcohol use or pesticides  · Pressure on a nerve or a broken bone  · Other things like problems with blood flow or swelling  · Low levels of certain vitamins  · Some kinds of infections  · Certain cancers  What are the main signs?   The signs depend on what nerves are damaged. They may include:  · Pain, numbness, and tingling that often starts in the feet or hands, can be shooting or stabbing  · Not able to feel hot or cold  · More sensitive to touching things  · Poor coordination, muscle weakness, cramping, or twitching  · Bowel and bladder problems like loose or hard stools, leaking urine, not able to pass urine  · Trouble swallowing or breathing  · Dizziness  · Lots of sweating  · Sex problems  How does the doctor diagnose this health problem?   Your doctor will do an exam and ask about your history. Your doctor will feel around the area of the body where you are having problems. Your doctor will check the feeling in your arms and legs. Your reflexes, motion, and  strength will also be checked. If your problems are in your legs, your doctor may have you walk and stand on your heels and toes. The doctor may order:  · Lab tests  · Nerve conduction velocity test (NCV) ? to see how fast electrical signals go through nerves  · Electromyelogram (EMG) ? to look at how well the nerves are working in the muscles  · Spinal tap  · CT or MRI scan  · Nerve or skin biopsy to look at nerve tissue  How does the doctor treat this health problem?   Treating your neuropathy means treating the cause. This may include:  · Controlling blood sugar  · Limiting use of beer, wine, and mixed drinks (alcohol)  · Treating a vitamin deficiency  · Quitting smoking  · Brace or splint to keep pressure off the nerves  · Cane, walker, or wheelchair to help you get around safely  · Compression sleeves or stockings  · Exercises for strengthening weak muscles and stretching tight muscles  · Desensitization  · Ventilator if breathing is very poor  · Surgery  Are there other health problems to treat?   If neuropathy is causing trouble with certain things such as balance, digestion, or bladder and bowel function, these things will need to be treated.  What drugs may be needed?   The doctor may order drugs to:  · Control blood sugar  · Help with pain  · Suppress the immune system  · Help with eating, bathroom, or sex problems  What problems could happen?   · Long-term pain or nerve damage  · Sores on the feet  · Loss of balance, trouble walking, and a higher risk of falling  · Damage to peripheral nerves can affect your blood flow and heartbeat  What can be done to prevent this health problem?   · Control high blood sugar.  · Limit alcohol use.  · If you are a smoker, quit. Smoking lessens the blood supply to peripheral nerves.  · If you have a vitamin deficiency, talk to your doctor to see if you need to add any vitamins to your diet.  · Keep a healthy weight. If you are overweight, lose weight.  · Avoid toxic  chemicals, pesticides, and other toxins.  Where can I learn more?   American Cancer Society  http://www.cancer.org/treatment/treatmentsandsideeffects/physicalsideeffects/chemotherapyeffects/peripheralneuropathy/peripheral-neuropathy-caused-by-chemotherapy-anna   National [employer] of Neurological Disorders and Stroke  http://www.ninds.nih.gov/disorders/peripheralneuropathy/detail_peripheralneuropathy.htm   Last Reviewed Date   2020-10-12  Consumer Information Use and Disclaimer   This information is not specific medical advice and does not replace information you receive from your health care provider. This is only a brief summary of general information. It does NOT include all information about conditions, illnesses, injuries, tests, procedures, treatments, therapies, discharge instructions or life-style choices that may apply to you. You must talk with your health care provider for complete information about your health and treatment options. This information should not be used to decide whether or not to accept your health care providers advice, instructions or recommendations. Only your health care provider has the knowledge and training to provide advice that is right for you.  Copyright   Copyright © 2021 UpToDate, Inc. and its affiliates and/or licensors. All rights reserved.  Patient Education       Cough, Runny Nose, and the Common Cold   The Basics   Written by the doctors and editors at [facility]   What causes cough, runny nose, and other symptoms of the common cold? -- These symptoms are usually caused by a viral infection. Lots of different viruses can take hold inside your nose, mouth, throat, or airways and cause cold symptoms.  Most people get over a cold without any lasting problems. Even so, having a cold can be uncomfortable. Also, some cold symptoms can also be caused by other illnesses, such as coronavirus 2019 (COVID-19) or the flu.  What are the symptoms of the common cold? -- The symptoms  include:  · Sneezing  · Coughing  · Sniffling and runny nose  · Sore throat  · Chest congestion  In children, the common cold can also cause a fever. But adults do not usually get a fever when they have a cold. Some symptoms of the common cold can overlap with symptoms of COVID-19, although sneezing is uncommon in COVID-19.   When should I call the doctor or nurse? -- Contact your doctor or nurse if you live in an area where people have COVID-19. They will ask you questions about your symptoms and whether you might be at risk. They can tell you if you should get tested for the virus that causes COVID-19. If they think you are more likely to just have a cold, they might tell you to stay home and contact them again if your symptoms change or get worse.   You should also contact your doctor or nurse if you:  · Lose your sense of taste or smell  · Have a fever of more than 100.4º F (38º C) that comes with shaking chills, loss of appetite, or trouble breathing  · Have a fever and also have lung disease, such as emphysema or asthma  · Have a cough that lasts longer than 10 days  · Have chest pain when you cough or breathe deeply, have trouble breathing, or cough up blood  If you are older than 65, or if you have any chronic medical conditions such as diabetes, you should contact your doctor or nurse any time you get a long-lasting cough.  Take your child to the emergency room if they:  · Become confused or stop responding to you  · Have trouble breathing or have to work hard to breathe  Contact your child's doctor or nurse if the child:  · Refuses to drink anything for a long time  · Is younger than 4 months  · Has a fever and is not acting like themself  · Has a cough that lasts for more than 2 weeks and is not getting any better  · Has a stuffed or runny nose that gets worse or does not get any better after 10 days  · Has red eyes or yellow goop coming out of their eyes  · Has ear pain, pulls at their ears, or shows  other signs of having an ear infection  What can I do to feel better? -- If you are a teenager or an adult, you can try cough and cold medicines that you can get without a prescription. These medicines might help with your symptoms. But they won't cure your cold, or help you get well faster.  If you decide to try nonprescription cold medicines, be sure to follow the directions on the label. Do not combine 2 or more medicines that have acetaminophen in them. If you take too much acetaminophen, the drug can damage your liver. Also, if you have a heart condition, high blood pressure, or you take any prescription medicines, ask your pharmacist if it is safe to take the cold medicine you have in mind.  What should I know if my child has a cold? -- In children, the common cold is often more severe than it is in adults. It also lasts longer. Plus, children often get a fever during the first 3 days of a cold.  Are cough and cold medicines safe for children? -- If your child is younger than 6, you should not give them any cold medicines. These medicines are not safe for young children. Even if your child is older than 6, cough and cold medicines are unlikely to help.  Never give aspirin to any child younger than 18 years old. In children, aspirin can cause a life-threatening condition called Reye syndrome. When giving your child acetaminophen or other nonprescription medicines, never give more than the recommended dose.  How long will I be sick? -- Colds usually last 3 to 7 days in adults and 10 days in children, but some people have symptoms for up to 2 weeks.  Can the common cold lead to more serious problems? -- In some cases, yes. In some people having a cold can lead to:  · Ear infections  · Worsening of asthma symptoms  · Sinus infections  · Pneumonia or bronchitis (infections of the lungs)  How can I keep from getting another cold? -- The most important thing you can do is to wash your hands often with soap and water.  This can also prevent the spread of other illnesses like the flu and COVID-19. The table has instructions on how to wash your hands to prevent spreading illness (table 1).  The germs that cause the common cold can live on tables, door handles, and other surfaces for at least 2 hours. You never know when you might be touching germs. That's why it's so important to clean your hands often.  It's also important to stay away from other people when you are sick. This will help prevent the spread of illness.  All topics are updated as new evidence becomes available and our peer review process is complete.  This topic retrieved from [employer] on: Sep 21, 2021.  Topic 29631 Version 20.0  Release: 29.4.2 - C29.263  © 2021 UpToDate, Inc. and/or its affiliates. All rights reserved.  table 1: Hand washing to prevent spreading illness  · Wet your hands and put soap on them    · Rub your hands together for at least 20 seconds. Make sure to clean your wrists, fingernails, and in between your fingers.    · Rinse your hands    · Dry your hands with a paper towel that you can throw away    If you are not near a sink, you can use a hand gel to clean your hands. The gels with at least 60 percent alcohol work the best. But it is better to wash with soap and water if you can.  Graphic 112218 Version 3.0  Consumer Information Use and Disclaimer   This information is not specific medical advice and does not replace information you receive from your health care provider. This is only a brief summary of general information. It does NOT include all information about conditions, illnesses, injuries, tests, procedures, treatments, therapies, discharge instructions or life-style choices that may apply to you. You must talk with your health care provider for complete information about your health and treatment options. This information should not be used to decide whether or not to accept your health care provider's advice, instructions or  recommendations. Only your health care provider has the knowledge and training to provide advice that is right for you. The use of this information is governed by the [employer] End User License Agreement, available at https://www.SinoHub.GreenDust/en/solutions/[employer]/about/priscila.The use of [employer] content is governed by the [employer] Terms of Use. ©2021 UpToDate, Inc. All rights reserved.  Copyright   © 2021 UpToDate, Inc. and/or its affiliates. All rights reserved.

## 2022-01-07 ENCOUNTER — PATIENT MESSAGE (OUTPATIENT)
Dept: FAMILY MEDICINE | Facility: CLINIC | Age: 67
End: 2022-01-07
Payer: MEDICARE

## 2022-01-07 LAB
CTP QC/QA: YES
SARS-COV-2 RDRP RESP QL NAA+PROBE: NEGATIVE

## 2022-01-13 ENCOUNTER — PATIENT MESSAGE (OUTPATIENT)
Dept: FAMILY MEDICINE | Facility: CLINIC | Age: 67
End: 2022-01-13
Payer: MEDICARE

## 2022-01-14 ENCOUNTER — PATIENT MESSAGE (OUTPATIENT)
Dept: FAMILY MEDICINE | Facility: CLINIC | Age: 67
End: 2022-01-14
Payer: MEDICARE

## 2022-01-14 RX ORDER — DOXYCYCLINE 100 MG/1
100 CAPSULE ORAL EVERY 12 HOURS
Qty: 20 CAPSULE | Refills: 0 | Status: SHIPPED | OUTPATIENT
Start: 2022-01-14 | End: 2022-01-24

## 2022-03-29 ENCOUNTER — HOSPITAL ENCOUNTER (EMERGENCY)
Facility: HOSPITAL | Age: 67
Discharge: HOME | End: 2022-03-29
Attending: EMERGENCY MEDICINE
Payer: MEDICARE

## 2022-03-29 VITALS
DIASTOLIC BLOOD PRESSURE: 81 MMHG | WEIGHT: 152 LBS | HEIGHT: 65 IN | OXYGEN SATURATION: 100 % | HEART RATE: 98 BPM | TEMPERATURE: 98 F | RESPIRATION RATE: 18 BRPM | SYSTOLIC BLOOD PRESSURE: 152 MMHG | BODY MASS INDEX: 25.33 KG/M2

## 2022-03-29 DIAGNOSIS — S59.911A: Primary | ICD-10-CM

## 2022-03-29 PROCEDURE — 90714 TD VACC NO PRESV 7 YRS+ IM: CPT | Performed by: NURSE PRACTITIONER

## 2022-03-29 PROCEDURE — 63600175 PHARM REV CODE 636 W HCPCS: Performed by: NURSE PRACTITIONER

## 2022-03-29 PROCEDURE — 20103 EXPL PENTRG WOUND EXTREMITY: CPT | Mod: RT

## 2022-03-29 PROCEDURE — 99284 EMERGENCY DEPT VISIT MOD MDM: CPT | Mod: 25

## 2022-03-29 PROCEDURE — 25000003 PHARM REV CODE 250: Performed by: NURSE PRACTITIONER

## 2022-03-29 PROCEDURE — 90471 IMMUNIZATION ADMIN: CPT | Performed by: NURSE PRACTITIONER

## 2022-03-29 RX ORDER — MUPIROCIN 20 MG/G
OINTMENT TOPICAL ONCE
Status: COMPLETED | OUTPATIENT
Start: 2022-03-29 | End: 2022-03-29

## 2022-03-29 RX ORDER — MUPIROCIN 20 MG/G
OINTMENT TOPICAL 2 TIMES DAILY
Qty: 22 G | Refills: 0 | Status: SHIPPED | OUTPATIENT
Start: 2022-03-29 | End: 2022-06-02

## 2022-03-29 RX ORDER — LIDOCAINE HYDROCHLORIDE 10 MG/ML
5 INJECTION, SOLUTION EPIDURAL; INFILTRATION; INTRACAUDAL; PERINEURAL
Status: COMPLETED | OUTPATIENT
Start: 2022-03-29 | End: 2022-03-29

## 2022-03-29 RX ORDER — DOXYCYCLINE 100 MG/1
100 CAPSULE ORAL 2 TIMES DAILY
Qty: 20 CAPSULE | Refills: 0 | Status: SHIPPED | OUTPATIENT
Start: 2022-03-29 | End: 2022-04-12

## 2022-03-29 RX ADMIN — TETANUS AND DIPHTHERIA TOXOIDS ADSORBED 0.5 ML: 2; 2 INJECTION INTRAMUSCULAR at 03:03

## 2022-03-29 RX ADMIN — LIDOCAINE HYDROCHLORIDE 50 MG: 10 INJECTION, SOLUTION EPIDURAL; INFILTRATION; INTRACAUDAL; PERINEURAL at 02:03

## 2022-03-29 RX ADMIN — MUPIROCIN: 20 OINTMENT TOPICAL at 03:03

## 2022-03-29 NOTE — ED PROVIDER NOTES
Encounter Date: 3/29/2022       History     Chief Complaint   Patient presents with    Wrist Injury     Fish hook to R interior wrist      Presents with fish hook to the right wrist  [place]set P[place]         Review of patient's allergies indicates:   Allergen Reactions    Pcn [penicillins] Hives and Nausea Only    Hydrocodone Nausea Only     Past Medical History:   Diagnosis Date    Allergy     Migraine      Past Surgical History:   Procedure Laterality Date    facial biopsy Right 08/15/2021    right forehead biopsy result basal cell carcinoma    TONSILLECTOMY      TUBAL LIGATION       Family History   Problem Relation Age of Onset    Cancer Mother 42        breast cancer    Heart disease Father         quadrouple bypass    Heart disease Maternal Grandmother     COPD Maternal Grandfather     Heart disease Paternal Grandmother      Social History     Tobacco Use    Smoking status: Passive Smoke Exposure - Never Smoker    Smokeless tobacco: Never Used   Substance Use Topics    Alcohol use: Yes     Comment: 2 glasses of red wine daily    Drug use: No     Review of Systems   Constitutional: Negative for fever.   Respiratory: Negative for cough, shortness of breath and wheezing.    Cardiovascular: Negative for chest pain, palpitations and leg swelling.   Gastrointestinal: Negative for abdominal pain, diarrhea, nausea and vomiting.   Genitourinary: Negative for dysuria.   Musculoskeletal: Negative for back pain.   Skin: Negative for rash.        Foreign body to skin right wrist.   Neurological: Negative for weakness.       Physical Exam     Initial Vitals [03/29/22 1258]   BP Pulse Resp Temp SpO2   (!) 160/84 100 18 97.6 °F (36.4 °C) 100 %      MAP       --         Physical Exam    Constitutional: She appears well-developed and well-nourished.   HENT:   Head: Normocephalic and atraumatic.   Mouth/Throat: Oropharynx is clear and moist.   Eyes: Conjunctivae are normal.   Neck: Neck  supple.   Normal range of motion.  Cardiovascular: Normal rate, regular rhythm and normal heart sounds.   Pulmonary/Chest: Breath sounds normal. No respiratory distress.   Musculoskeletal:         General: Normal range of motion.      Cervical back: Normal range of motion and neck supple.     Neurological: She is alert and oriented to person, place, and time. No sensory deficit. GCS score is 15. GCS eye subscore is 4. GCS verbal subscore is 5. GCS motor subscore is 6.   Skin: Skin is warm and dry. Capillary refill takes less than 2 seconds. No erythema.   Fish hook to the right wrist    Psychiatric: She has a normal mood and affect. Thought content normal.         ED Course   Foreign Body    Date/Time: 3/29/2022 2:55 PM  Performed by: Chyna Seals NP  Authorized by: Pranav Hilton MD   Consent Done: Not Needed  Body area: skin  General location: upper extremity  Location details: right wrist    Anesthesia:  Local Anesthetic: lidocaine 1% without epinephrine  Anesthetic total: 3 mL  Localization method: visualized  Removal mechanism: hemostat  Dressing: antibiotic ointment and dressing applied  Tendon involvement: none  Depth: subcutaneous  Complexity: simple  1 objects recovered.  Objects recovered: fish hook  Post-procedure assessment: foreign body removed  Patient tolerance: Patient tolerated the procedure well with no immediate complications      Labs Reviewed - No data to display       Imaging Results    None          Medications   LIDOcaine (PF) 10 mg/ml (1%) injection 50 mg (50 mg Infiltration Given 3/29/22 1415)   mupirocin 2 % ointment ( Topical (Top) Given 3/29/22 1515)   diptheria-tetanus toxoids 2-2 Lf unit/0.5 mL injection 0.5 mL (0.5 mLs Intramuscular Given 3/29/22 1530)     Medical Decision Making:   Initial Assessment:   Fish hook to right wrist  Onset PTA pt was   ED Management:  Fish hook removed from the right wrist  Pt tolerated very well   Bactroban ointment applied along with a band aid  Pt  was given a tetanus inj.  At discharge this pt appeared in NAD   Have discussed this pt with Dr. Hilton                       Clinical Impression:   Final diagnoses:  [H73.565C] Fish hook injury of right forearm, initial encounter (Primary)          ED Disposition Condition    Discharge Stable        ED Prescriptions     Medication Sig Dispense Start Date End Date Auth. Provider    doxycycline (VIBRAMYCIN) 100 MG Cap Take 1 capsule (100 mg total) by mouth 2 (two) times daily. for 14 days 20 capsule 3/29/2022 4/12/2022 Chyna Seals NP    mupirocin (BACTROBAN) 2 % ointment Apply topically 2 (two) times daily. 22 g 3/29/2022  Chyna Seals NP        Follow-up Information     Follow up With Specialties Details Why Contact Info    Ezequiel Hernandez MD Family Medicine, Home Health Services, Hospice Services In 3 days  [address]  [address]  [facility] [address]  [phone]             Chyna Seals NP  03/29/22 4403

## 2022-05-09 ENCOUNTER — OFFICE VISIT (OUTPATIENT)
Dept: FAMILY MEDICINE | Facility: CLINIC | Age: 67
End: 2022-05-09
Payer: MEDICARE

## 2022-05-09 ENCOUNTER — TELEPHONE (OUTPATIENT)
Dept: FAMILY MEDICINE | Facility: CLINIC | Age: 67
End: 2022-05-09

## 2022-05-09 VITALS
HEART RATE: 86 BPM | HEIGHT: 65 IN | DIASTOLIC BLOOD PRESSURE: 88 MMHG | BODY MASS INDEX: 24.99 KG/M2 | SYSTOLIC BLOOD PRESSURE: 138 MMHG | WEIGHT: 150 LBS | OXYGEN SATURATION: 100 %

## 2022-05-09 DIAGNOSIS — R07.9 CHEST PAIN, UNSPECIFIED TYPE: Primary | ICD-10-CM

## 2022-05-09 LAB
EKG 12-LEAD: NORMAL
PR INTERVAL: NORMAL
PRT AXES: NORMAL
QRS DURATION: NORMAL
QT/QTC: NORMAL
VENTRICULAR RATE: NORMAL

## 2022-05-09 PROCEDURE — 93000 ELECTROCARDIOGRAM COMPLETE: CPT | Mod: S$GLB,,, | Performed by: PHYSICIAN ASSISTANT

## 2022-05-09 PROCEDURE — 3008F BODY MASS INDEX DOCD: CPT | Mod: CPTII,S$GLB,, | Performed by: PHYSICIAN ASSISTANT

## 2022-05-09 PROCEDURE — 3079F DIAST BP 80-89 MM HG: CPT | Mod: CPTII,S$GLB,, | Performed by: PHYSICIAN ASSISTANT

## 2022-05-09 PROCEDURE — 1159F MED LIST DOCD IN RCRD: CPT | Mod: CPTII,S$GLB,, | Performed by: PHYSICIAN ASSISTANT

## 2022-05-09 PROCEDURE — 3008F PR BODY MASS INDEX (BMI) DOCUMENTED: ICD-10-PCS | Mod: CPTII,S$GLB,, | Performed by: PHYSICIAN ASSISTANT

## 2022-05-09 PROCEDURE — 93000 POCT EKG 12-LEAD: ICD-10-PCS | Mod: S$GLB,,, | Performed by: PHYSICIAN ASSISTANT

## 2022-05-09 PROCEDURE — 3079F PR MOST RECENT DIASTOLIC BLOOD PRESSURE 80-89 MM HG: ICD-10-PCS | Mod: CPTII,S$GLB,, | Performed by: PHYSICIAN ASSISTANT

## 2022-05-09 PROCEDURE — 1159F PR MEDICATION LIST DOCUMENTED IN MEDICAL RECORD: ICD-10-PCS | Mod: CPTII,S$GLB,, | Performed by: PHYSICIAN ASSISTANT

## 2022-05-09 PROCEDURE — 3075F SYST BP GE 130 - 139MM HG: CPT | Mod: CPTII,S$GLB,, | Performed by: PHYSICIAN ASSISTANT

## 2022-05-09 PROCEDURE — 99214 PR OFFICE/OUTPT VISIT, EST, LEVL IV, 30-39 MIN: ICD-10-PCS | Mod: 25,S$GLB,, | Performed by: PHYSICIAN ASSISTANT

## 2022-05-09 PROCEDURE — 99214 OFFICE O/P EST MOD 30 MIN: CPT | Mod: 25,S$GLB,, | Performed by: PHYSICIAN ASSISTANT

## 2022-05-09 PROCEDURE — 3075F PR MOST RECENT SYSTOLIC BLOOD PRESS GE 130-139MM HG: ICD-10-PCS | Mod: CPTII,S$GLB,, | Performed by: PHYSICIAN ASSISTANT

## 2022-05-09 RX ORDER — PANTOPRAZOLE SODIUM 40 MG/1
40 TABLET, DELAYED RELEASE ORAL DAILY
Qty: 30 TABLET | Refills: 2 | Status: SHIPPED | OUTPATIENT
Start: 2022-05-09 | End: 2022-06-02

## 2022-05-09 NOTE — TELEPHONE ENCOUNTER
----- Message from Sheila Castrejon sent at 5/9/2022  8:45 AM CDT -----  Pt states that she has a tightness in her chest and wants to be seen. Please advise. Pt #[phone]

## 2022-05-09 NOTE — PROGRESS NOTES
SUBJECTIVE:    Patient ID: Elle Hernandez is a 67 y.o. female.    Chief Complaint: Chest Pain (Chest pain for about 1 mo.//nausea//no med bottles//tc)    This is a 67-year-old female who presents today with chief complaint of chest pain for about 1 month now.  Reports nausea associated.  She describes a metallic taste in her mouth, feels hungry and then after she eats she is nauseous and has a tightness in her chest.  The symptoms have been reproduced when bending down.  At time she describes a stabbing pain under her breast can be left-sided or right-sided she has no motivation and says while driving long distances she has started feeling fuzzy.  She indicates bright red blood at times with bowel movements.  History of hemorrhoids.  No blood work in over a year.  Strong family history of heart disease and stroke.  Positive in her father and grandmother.  She had her mammogram completed last year that was normal and due to recheck this time of year.      Office Visit on 01/06/2022   Component Date Value Ref Range Status    POC Rapid COVID 01/07/2022 Negative  Negative Final     Acceptable 01/07/2022 Yes   Final   Lab Visit on 01/03/2022   Component Date Value Ref Range Status    SARS-CoV2 (COVID-19) Qualitative P* 01/03/2022 Not Detected   Final       Past Medical History:   Diagnosis Date    Allergy     Migraine      Past Surgical History:   Procedure Laterality Date    facial biopsy Right 08/15/2021    right forehead biopsy result basal cell carcinoma    TONSILLECTOMY      TUBAL LIGATION       Family History   Problem Relation Age of Onset    Cancer Mother 42        breast cancer    Heart disease Father         quadrouple bypass    Heart disease Maternal Grandmother     COPD Maternal Grandfather     Heart disease Paternal Grandmother        Marital Status:   Alcohol History:  reports current alcohol use.  Tobacco History:  reports that she is a non-smoker but has been  "exposed to tobacco smoke. She has never used smokeless tobacco.  Drug History:  reports no history of drug use.    Review of patient's allergies indicates:   Allergen Reactions    Pcn [penicillins] Hives and Nausea Only    Hydrocodone Nausea Only       Current Outpatient Medications:     aspirin (ECOTRIN) 81 MG EC tablet, Take 81 mg by mouth once daily., Disp: , Rfl:     coenzyme Q10 (CO Q-10) 100 mg capsule, Take 100 mg by mouth once daily., Disp: , Rfl:     docosahexanoic acid/epa (FISH OIL ORAL), Take by mouth., Disp: , Rfl:     gabapentin (NEURONTIN) 300 MG capsule, Take 1 capsule (300 mg total) by mouth every evening., Disp: 30 capsule, Rfl: 2    ibuprofen (ADVIL,MOTRIN) 800 MG tablet, Take 1 tablet (800 mg total) by mouth 3 (three) times daily., Disp: 30 tablet, Rfl: 1    mupirocin (BACTROBAN) 2 % ointment, Apply topically 2 (two) times daily., Disp: 22 g, Rfl: 0    pantoprazole (PROTONIX) 40 MG tablet, Take 1 tablet (40 mg total) by mouth once daily., Disp: 30 tablet, Rfl: 2    Review of Systems   Respiratory: Positive for chest tightness.    Cardiovascular: Positive for chest pain.   Gastrointestinal: Positive for blood in stool (BRBPR).   Neurological: Positive for dizziness and headaches.          Objective:      Vitals:    05/09/22 1142 05/09/22 1148   BP: (!) 152/90 138/88   Pulse: 86    SpO2: 100%    Weight: 68 kg (150 lb)    Height: 5' 5" (1.651 m)      Physical Exam  Constitutional:       General: She is not in acute distress.     Appearance: She is well-developed.   HENT:      Head: Normocephalic and atraumatic.   Eyes:      Conjunctiva/sclera: Conjunctivae normal.      Pupils: Pupils are equal, round, and reactive to light.   Neck:      Thyroid: No thyromegaly.   Cardiovascular:      Rate and Rhythm: Normal rate and regular rhythm.      Heart sounds: Normal heart sounds.   Pulmonary:      Effort: Pulmonary effort is normal.      Breath sounds: Normal breath sounds.   Abdominal:      " General: Bowel sounds are normal. There is no distension.      Palpations: Abdomen is soft.      Tenderness: There is no abdominal tenderness.   Musculoskeletal:         General: Normal range of motion.      Cervical back: Normal range of motion and neck supple.   Skin:     General: Skin is warm and dry.      Findings: No erythema.   Neurological:      Mental Status: She is alert and oriented to person, place, and time.      Cranial Nerves: No cranial nerve deficit.           Assessment:       1. Chest pain, unspecified type         Plan:       Chest pain, unspecified type  Comments:  Unclear etiology.  EKG sinus rhythm non-acute nonspecific changes.  With like to complete stress test.  Labs today.  Cover for GERD with PPI. F/U in 3 weeks.  Orders:  -     POCT EKG 12-LEAD (NOT FOR [facility] USE)  -     CBC Auto Differential; Future; Expected date: 05/09/2022  -     Comprehensive Metabolic Panel; Future; Expected date: 05/09/2022  -     Lipid Panel; Future; Expected date: 05/09/2022  -     TSH w/reflex to FT4; Future; Expected date: 05/09/2022  -     Urinalysis, Reflex to Urine Culture Urine, Clean Catch  -     Exercise Stress - EKG; Future  -     pantoprazole (PROTONIX) 40 MG tablet; Take 1 tablet (40 mg total) by mouth once daily.  Dispense: 30 tablet; Refill: 2      No follow-ups on file.        5/9/2022 Jorge Villeda PA-C

## 2022-05-10 LAB
ALBUMIN SERPL-MCNC: 4.6 G/DL (ref 3.6–5.1)
ALBUMIN/GLOB SERPL: 2 (CALC) (ref 1–2.5)
ALP SERPL-CCNC: 75 U/L (ref 37–153)
ALT SERPL-CCNC: 10 U/L (ref 6–29)
AST SERPL-CCNC: 15 U/L (ref 10–35)
BASOPHILS # BLD AUTO: 32 CELLS/UL (ref 0–200)
BASOPHILS NFR BLD AUTO: 0.6 %
BILIRUB SERPL-MCNC: 0.5 MG/DL (ref 0.2–1.2)
BUN SERPL-MCNC: 16 MG/DL (ref 7–25)
BUN/CREAT SERPL: NORMAL (CALC) (ref 6–22)
CALCIUM SERPL-MCNC: 9.9 MG/DL (ref 8.6–10.4)
CHLORIDE SERPL-SCNC: 101 MMOL/L (ref 98–110)
CHOLEST SERPL-MCNC: 219 MG/DL
CHOLEST/HDLC SERPL: 2.8 (CALC)
CO2 SERPL-SCNC: 27 MMOL/L (ref 20–32)
CREAT SERPL-MCNC: 0.91 MG/DL (ref 0.5–0.99)
EOSINOPHIL # BLD AUTO: 130 CELLS/UL (ref 15–500)
EOSINOPHIL NFR BLD AUTO: 2.4 %
ERYTHROCYTE [DISTWIDTH] IN BLOOD BY AUTOMATED COUNT: 12.5 % (ref 11–15)
GLOBULIN SER CALC-MCNC: 2.3 G/DL (CALC) (ref 1.9–3.7)
GLUCOSE SERPL-MCNC: 92 MG/DL (ref 65–99)
HCT VFR BLD AUTO: 40.9 % (ref 35–45)
HDLC SERPL-MCNC: 78 MG/DL
HGB BLD-MCNC: 14 G/DL (ref 11.7–15.5)
LDLC SERPL CALC-MCNC: 122 MG/DL (CALC)
LYMPHOCYTES # BLD AUTO: 1993 CELLS/UL (ref 850–3900)
LYMPHOCYTES NFR BLD AUTO: 36.9 %
MCH RBC QN AUTO: 31.5 PG (ref 27–33)
MCHC RBC AUTO-ENTMCNC: 34.2 G/DL (ref 32–36)
MCV RBC AUTO: 91.9 FL (ref 80–100)
MONOCYTES # BLD AUTO: 432 CELLS/UL (ref 200–950)
MONOCYTES NFR BLD AUTO: 8 %
NEUTROPHILS # BLD AUTO: 2813 CELLS/UL (ref 1500–7800)
NEUTROPHILS NFR BLD AUTO: 52.1 %
NONHDLC SERPL-MCNC: 141 MG/DL (CALC)
PLATELET # BLD AUTO: 305 THOUSAND/UL (ref 140–400)
PMV BLD REES-ECKER: 8.7 FL (ref 7.5–12.5)
POTASSIUM SERPL-SCNC: 4.7 MMOL/L (ref 3.5–5.3)
PROT SERPL-MCNC: 6.9 G/DL (ref 6.1–8.1)
RBC # BLD AUTO: 4.45 MILLION/UL (ref 3.8–5.1)
SODIUM SERPL-SCNC: 139 MMOL/L (ref 135–146)
TRIGL SERPL-MCNC: 88 MG/DL
TSH SERPL-ACNC: 1.16 MIU/L (ref 0.4–4.5)
WBC # BLD AUTO: 5.4 THOUSAND/UL (ref 3.8–10.8)

## 2022-05-15 ENCOUNTER — PATIENT MESSAGE (OUTPATIENT)
Dept: FAMILY MEDICINE | Facility: CLINIC | Age: 67
End: 2022-05-15

## 2022-05-15 DIAGNOSIS — Z12.31 OTHER SCREENING MAMMOGRAM: Primary | ICD-10-CM

## 2022-05-17 ENCOUNTER — PATIENT MESSAGE (OUTPATIENT)
Dept: FAMILY MEDICINE | Facility: CLINIC | Age: 67
End: 2022-05-17

## 2022-05-18 ENCOUNTER — PATIENT MESSAGE (OUTPATIENT)
Dept: FAMILY MEDICINE | Facility: CLINIC | Age: 67
End: 2022-05-18

## 2022-05-19 ENCOUNTER — TELEPHONE (OUTPATIENT)
Dept: FAMILY MEDICINE | Facility: CLINIC | Age: 67
End: 2022-05-19

## 2022-05-19 NOTE — TELEPHONE ENCOUNTER
----- Message from Maya Leone sent at 5/19/2022  2:04 PM CDT -----  Lenore with [facility] calling said the pt is coming in jam for a mammo and the orders they received were electronicaly signed by YAIR Magana and they cannot accept this it needs a new order signed by the provider.   Fax # [phone]

## 2022-05-26 ENCOUNTER — OFFICE VISIT (OUTPATIENT)
Dept: ORTHOPEDICS | Facility: CLINIC | Age: 67
End: 2022-05-26
Payer: MEDICARE

## 2022-05-26 VITALS — HEIGHT: 65 IN | BODY MASS INDEX: 24.99 KG/M2 | WEIGHT: 150 LBS

## 2022-05-26 DIAGNOSIS — M47.816 LUMBAR SPONDYLOSIS: Primary | ICD-10-CM

## 2022-05-26 DIAGNOSIS — M17.12 PRIMARY OSTEOARTHRITIS OF LEFT KNEE: ICD-10-CM

## 2022-05-26 PROCEDURE — 3288F PR FALLS RISK ASSESSMENT DOCUMENTED: ICD-10-PCS | Mod: CPTII,S$GLB,, | Performed by: ORTHOPAEDIC SURGERY

## 2022-05-26 PROCEDURE — 3008F BODY MASS INDEX DOCD: CPT | Mod: CPTII,S$GLB,, | Performed by: ORTHOPAEDIC SURGERY

## 2022-05-26 PROCEDURE — 1101F PR PT FALLS ASSESS DOC 0-1 FALLS W/OUT INJ PAST YR: ICD-10-PCS | Mod: CPTII,S$GLB,, | Performed by: ORTHOPAEDIC SURGERY

## 2022-05-26 PROCEDURE — 1159F MED LIST DOCD IN RCRD: CPT | Mod: CPTII,S$GLB,, | Performed by: ORTHOPAEDIC SURGERY

## 2022-05-26 PROCEDURE — 20610 LARGE JOINT ASPIRATION/INJECTION: L KNEE: ICD-10-PCS | Mod: LT,S$GLB,, | Performed by: ORTHOPAEDIC SURGERY

## 2022-05-26 PROCEDURE — 3288F FALL RISK ASSESSMENT DOCD: CPT | Mod: CPTII,S$GLB,, | Performed by: ORTHOPAEDIC SURGERY

## 2022-05-26 PROCEDURE — 3008F PR BODY MASS INDEX (BMI) DOCUMENTED: ICD-10-PCS | Mod: CPTII,S$GLB,, | Performed by: ORTHOPAEDIC SURGERY

## 2022-05-26 PROCEDURE — 1101F PT FALLS ASSESS-DOCD LE1/YR: CPT | Mod: CPTII,S$GLB,, | Performed by: ORTHOPAEDIC SURGERY

## 2022-05-26 PROCEDURE — 99213 PR OFFICE/OUTPT VISIT, EST, LEVL III, 20-29 MIN: ICD-10-PCS | Mod: 25,S$GLB,, | Performed by: ORTHOPAEDIC SURGERY

## 2022-05-26 PROCEDURE — 20610 DRAIN/INJ JOINT/BURSA W/O US: CPT | Mod: LT,S$GLB,, | Performed by: ORTHOPAEDIC SURGERY

## 2022-05-26 PROCEDURE — 99213 OFFICE O/P EST LOW 20 MIN: CPT | Mod: 25,S$GLB,, | Performed by: ORTHOPAEDIC SURGERY

## 2022-05-26 PROCEDURE — 1125F PR PAIN SEVERITY QUANTIFIED, PAIN PRESENT: ICD-10-PCS | Mod: CPTII,S$GLB,, | Performed by: ORTHOPAEDIC SURGERY

## 2022-05-26 PROCEDURE — 1159F PR MEDICATION LIST DOCUMENTED IN MEDICAL RECORD: ICD-10-PCS | Mod: CPTII,S$GLB,, | Performed by: ORTHOPAEDIC SURGERY

## 2022-05-26 PROCEDURE — 1125F AMNT PAIN NOTED PAIN PRSNT: CPT | Mod: CPTII,S$GLB,, | Performed by: ORTHOPAEDIC SURGERY

## 2022-05-26 RX ORDER — TRIAMCINOLONE ACETONIDE 40 MG/ML
40 INJECTION, SUSPENSION INTRA-ARTICULAR; INTRAMUSCULAR
Status: DISCONTINUED | OUTPATIENT
Start: 2022-05-26 | End: 2022-05-26 | Stop reason: HOSPADM

## 2022-05-26 RX ADMIN — TRIAMCINOLONE ACETONIDE 40 MG: 40 INJECTION, SUSPENSION INTRA-ARTICULAR; INTRAMUSCULAR at 11:05

## 2022-05-26 NOTE — PROGRESS NOTES
formerly [facility]    Subjective:     Chief Complaint:   Chief Complaint   Patient presents with    Left Knee - Pain     Left knee pain that has been ongoing. Received an injection 09/19/19 which offered relief up until recently. Requesting inj today       Past Medical History:   Diagnosis Date    Allergy     Migraine        Past Surgical History:   Procedure Laterality Date    facial biopsy Right 08/15/2021    right forehead biopsy result basal cell carcinoma    TONSILLECTOMY      TUBAL LIGATION         Current Outpatient Medications   Medication Sig    gabapentin (NEURONTIN) 300 MG capsule Take 1 capsule (300 mg total) by mouth every evening.    aspirin (ECOTRIN) 81 MG EC tablet Take 81 mg by mouth once daily.    coenzyme Q10 100 mg capsule Take 100 mg by mouth once daily.    docosahexanoic acid/epa (FISH OIL ORAL) Take by mouth.    ibuprofen (ADVIL,MOTRIN) 800 MG tablet Take 1 tablet (800 mg total) by mouth 3 (three) times daily. (Patient not taking: Reported on 5/26/2022)    mupirocin (BACTROBAN) 2 % ointment Apply topically 2 (two) times daily. (Patient not taking: Reported on 5/26/2022)    pantoprazole (PROTONIX) 40 MG tablet Take 1 tablet (40 mg total) by mouth once daily. (Patient not taking: Reported on 5/26/2022)     No current facility-administered medications for this visit.       Review of patient's allergies indicates:   Allergen Reactions    Pcn [penicillins] Hives and Nausea Only    Hydrocodone Nausea Only       Family History   Problem Relation Age of Onset    Cancer Mother 42        breast cancer    Heart disease Father         quadrouple bypass    Heart disease Maternal Grandmother     COPD Maternal Grandfather     Heart disease Paternal Grandmother        Social History     Socioeconomic History    Marital status:    Tobacco Use    Smoking status: Passive Smoke Exposure - Never Smoker    Smokeless tobacco: Never Used   Substance and Sexual Activity    Alcohol  use: Yes     Comment: 2 glasses of red wine daily    Drug use: No       History of present illness:  Patient comes in today for the left knee in the low back.  In terms of the left knee I have injected her in the past with very good result.  She says that she just having achy pain.  In terms of her lumbar spine she is having pain around the right hip really just proximal to the buttock.  This has been longstanding      Review of Systems:    Constitution: Negative for chills, fever, and sweats.  Negative for unexplained weight loss.    HENT:  Negative for headaches and blurry vision.    Cardiovascular:Negative for chest pain or irregular heart beat. Negative for hypertension.    Respiratory:  Negative for cough and shortness of breath.    Gastrointestinal: Negative for abdominal pain, heartburn, melena, nausea, and vomitting.    Genitourinary:  Negative bladder incontinence and dysuria.    Musculoskeletal:  See HPI for details.     Neurological: Negative for numbness.    Psychiatric/Behavioral: Negative for depression.  The patient is not nervous/anxious.      Endocrine: Negative for polyuria    Hematologic/Lymphatic: Negative for bleeding problem.  Does not bruise/bleed easily.    Skin: Negative for poor would healing and rash    Objective:      Physical Examination:    Vital Signs:  There were no vitals filed for this visit.    Body mass index is 24.96 kg/m².    This a well-developed, well nourished patient in no acute distress.  They are alert and oriented and cooperative to examination.        Patient has full range of motion lumbar spine.  She can heel walk toe walk and squat.  Negative straight leg raises.  EHL is intact deep tendon reflexes are intact.  Full range of motion of the left knee.  No crepitus.  She does have medial joint line tenderness she does have 1+ effusion  Pertinent New Results:    XRAY Report / Interpretation:   AP lateral and sunrise views of the left knee demonstrate moderate 3  compartment arthrosis.    AP and lateral lumbar spine demonstrates spondylosis primarily at L2-3.    AP pelvis is within normal limits.    Assessment/Plan:      Lumbar spondylosis.  I started her on physical therapy and strengthening.  In terms of her left knee I injected the left knee with Kenalog lidocaine.  Follow-up p.r.n.      This note was created using Dragon voice recognition software that occasionally misinterpreted phrases or words.

## 2022-05-26 NOTE — PROCEDURES
Large Joint Aspiration/Injection: L knee    Date/Time: 5/26/2022 11:00 AM  Performed by: Pasha Ontiveros MD  Authorized by: Pasha Ontiveros MD     Consent Done?:  Yes (Verbal)  Indications:  Pain  Site marked: the procedure site was marked    Timeout: prior to procedure the correct patient, procedure, and site was verified    Prep: patient was prepped and draped in usual sterile fashion      Local anesthesia used?: Yes    Local anesthetic:  Lidocaine 1% without epinephrine  Ultrasonic Guidance for needle placement?: No    Location:  Knee  Site:  L knee  Medications:  40 mg triamcinolone acetonide 40 mg/mL  Patient tolerance:  Patient tolerated the procedure well with no immediate complications

## 2022-06-02 ENCOUNTER — TELEPHONE (OUTPATIENT)
Dept: FAMILY MEDICINE | Facility: CLINIC | Age: 67
End: 2022-06-02

## 2022-06-02 ENCOUNTER — OFFICE VISIT (OUTPATIENT)
Dept: FAMILY MEDICINE | Facility: CLINIC | Age: 67
End: 2022-06-02
Payer: MEDICARE

## 2022-06-02 VITALS
HEART RATE: 84 BPM | BODY MASS INDEX: 24.83 KG/M2 | WEIGHT: 149 LBS | HEIGHT: 65 IN | SYSTOLIC BLOOD PRESSURE: 100 MMHG | DIASTOLIC BLOOD PRESSURE: 78 MMHG | OXYGEN SATURATION: 99 %

## 2022-06-02 DIAGNOSIS — R42 DIZZINESS: ICD-10-CM

## 2022-06-02 DIAGNOSIS — R07.9 CHEST PAIN, UNSPECIFIED TYPE: Primary | ICD-10-CM

## 2022-06-02 PROCEDURE — 3008F PR BODY MASS INDEX (BMI) DOCUMENTED: ICD-10-PCS | Mod: CPTII,S$GLB,, | Performed by: PHYSICIAN ASSISTANT

## 2022-06-02 PROCEDURE — 3074F SYST BP LT 130 MM HG: CPT | Mod: CPTII,S$GLB,, | Performed by: PHYSICIAN ASSISTANT

## 2022-06-02 PROCEDURE — 1101F PR PT FALLS ASSESS DOC 0-1 FALLS W/OUT INJ PAST YR: ICD-10-PCS | Mod: CPTII,S$GLB,, | Performed by: PHYSICIAN ASSISTANT

## 2022-06-02 PROCEDURE — 3074F PR MOST RECENT SYSTOLIC BLOOD PRESSURE < 130 MM HG: ICD-10-PCS | Mod: CPTII,S$GLB,, | Performed by: PHYSICIAN ASSISTANT

## 2022-06-02 PROCEDURE — 99214 OFFICE O/P EST MOD 30 MIN: CPT | Mod: S$GLB,,, | Performed by: PHYSICIAN ASSISTANT

## 2022-06-02 PROCEDURE — 1101F PT FALLS ASSESS-DOCD LE1/YR: CPT | Mod: CPTII,S$GLB,, | Performed by: PHYSICIAN ASSISTANT

## 2022-06-02 PROCEDURE — 3008F BODY MASS INDEX DOCD: CPT | Mod: CPTII,S$GLB,, | Performed by: PHYSICIAN ASSISTANT

## 2022-06-02 PROCEDURE — 3078F DIAST BP <80 MM HG: CPT | Mod: CPTII,S$GLB,, | Performed by: PHYSICIAN ASSISTANT

## 2022-06-02 PROCEDURE — 1159F PR MEDICATION LIST DOCUMENTED IN MEDICAL RECORD: ICD-10-PCS | Mod: CPTII,S$GLB,, | Performed by: PHYSICIAN ASSISTANT

## 2022-06-02 PROCEDURE — 99214 PR OFFICE/OUTPT VISIT, EST, LEVL IV, 30-39 MIN: ICD-10-PCS | Mod: S$GLB,,, | Performed by: PHYSICIAN ASSISTANT

## 2022-06-02 PROCEDURE — 3288F PR FALLS RISK ASSESSMENT DOCUMENTED: ICD-10-PCS | Mod: CPTII,S$GLB,, | Performed by: PHYSICIAN ASSISTANT

## 2022-06-02 PROCEDURE — 3288F FALL RISK ASSESSMENT DOCD: CPT | Mod: CPTII,S$GLB,, | Performed by: PHYSICIAN ASSISTANT

## 2022-06-02 PROCEDURE — 3078F PR MOST RECENT DIASTOLIC BLOOD PRESSURE < 80 MM HG: ICD-10-PCS | Mod: CPTII,S$GLB,, | Performed by: PHYSICIAN ASSISTANT

## 2022-06-02 PROCEDURE — 1159F MED LIST DOCD IN RCRD: CPT | Mod: CPTII,S$GLB,, | Performed by: PHYSICIAN ASSISTANT

## 2022-06-02 NOTE — TELEPHONE ENCOUNTER
----- Message from Jeninfer Manley sent at 6/2/2022 11:46 AM CDT -----  Please call patient and schedule her 4 week follow up. Patient's callback number is [phone].

## 2022-06-02 NOTE — PROGRESS NOTES
SUBJECTIVE:    Patient ID: Elle Hernandez is a 67 y.o. female.    Chief Complaint: Follow-up (No RX's/pna denied/ light headedness, dizziness, brain fog/ over heating and a metallic taste in her mouth and this has been happening daily/ symptoms started after getting covid vaccine in 7/2021//dp)    This is a 67-year-old female who presents today with chief complaint of chest pain for about 1 month now.  Reports nausea associated.  She describes a metallic taste in her mouth, feels hungry and then after she eats she is nauseous and has a tightness in her chest.  The symptoms have been reproduced when bending down.  At time she describes a stabbing pain under her breast can be left-sided or right-sided she has no motivation and says while driving long distances she has started feeling fuzzy.  She indicates bright red blood at times with bowel movements.  History of hemorrhoids.  No blood work in over a year.  Strong family history of heart disease and stroke.  Positive in her father and grandmother.  She had her mammogram completed last year that was normal and due to recheck this time of year.  Her knee recently when out on her and she had to schedule with orthopedics.  She also is dealing with caregiving for her mother who lives to states away.  She rescheduled her stress test for June 8th. She does report that the left knee is doing a good bit better since the injection with DR. Ontiveros.      Office Visit on 05/09/2022   Component Date Value Ref Range Status    WBC 05/09/2022 5.4  3.8 - 10.8 Thousand/uL Final    RBC 05/09/2022 4.45  3.80 - 5.10 Million/uL Final    Hemoglobin 05/09/2022 14.0  11.7 - 15.5 g/dL Final    Hematocrit 05/09/2022 40.9  35.0 - 45.0 % Final    MCV 05/09/2022 91.9  80.0 - 100.0 fL Final    MCH 05/09/2022 31.5  27.0 - 33.0 pg Final    MCHC 05/09/2022 34.2  32.0 - 36.0 g/dL Final    RDW 05/09/2022 12.5  11.0 - 15.0 % Final    Platelets 05/09/2022 305  140 - 400 Thousand/uL Final     MPV 05/09/2022 8.7  7.5 - 12.5 fL Final    Neutrophils, Abs 05/09/2022 2,813  1,500 - 7,800 cells/uL Final    Lymph # 05/09/2022 1,993  850 - 3,900 cells/uL Final    Mono # 05/09/2022 432  200 - 950 cells/uL Final    Eos # 05/09/2022 130  15 - 500 cells/uL Final    Baso # 05/09/2022 32  0 - 200 cells/uL Final    Neutrophils Relative 05/09/2022 52.1  % Final    Lymph % 05/09/2022 36.9  % Final    Mono % 05/09/2022 8.0  % Final    Eosinophil % 05/09/2022 2.4  % Final    Basophil % 05/09/2022 0.6  % Final    Glucose 05/09/2022 92  65 - 99 mg/dL Final    BUN 05/09/2022 16  7 - 25 mg/dL Final    Creatinine 05/09/2022 0.91  0.50 - 0.99 mg/dL Final    eGFR if non  05/09/2022 66  > OR = 60 mL/min/1.73m2 Final    eGFR if  05/09/2022 76  > OR = 60 mL/min/1.73m2 Final    BUN/Creatinine Ratio 05/09/2022 NOT APPLICABLE  6 - 22 (calc) Final    Sodium 05/09/2022 139  135 - 146 mmol/L Final    Potassium 05/09/2022 4.7  3.5 - 5.3 mmol/L Final    Chloride 05/09/2022 101  98 - 110 mmol/L Final    CO2 05/09/2022 27  20 - 32 mmol/L Final    Calcium 05/09/2022 9.9  8.6 - 10.4 mg/dL Final    Total Protein 05/09/2022 6.9  6.1 - 8.1 g/dL Final    Albumin 05/09/2022 4.6  3.6 - 5.1 g/dL Final    Globulin, Total 05/09/2022 2.3  1.9 - 3.7 g/dL (calc) Final    Albumin/Globulin Ratio 05/09/2022 2.0  1.0 - 2.5 (calc) Final    Total Bilirubin 05/09/2022 0.5  0.2 - 1.2 mg/dL Final    Alkaline Phosphatase 05/09/2022 75  37 - 153 U/L Final    AST 05/09/2022 15  10 - 35 U/L Final    ALT 05/09/2022 10  6 - 29 U/L Final    Cholesterol 05/09/2022 219 (A) <200 mg/dL Final    HDL 05/09/2022 78  > OR = 50 mg/dL Final    Triglycerides 05/09/2022 88  <150 mg/dL Final    LDL Cholesterol 05/09/2022 122 (A) mg/dL (calc) Final    HDL/Cholesterol Ratio 05/09/2022 2.8  <5.0 (calc) Final    Non HDL Chol. (LDL+VLDL) 05/09/2022 141 (A) <130 mg/dL (calc) Final    TSH w/reflex to FT4 05/09/2022 1.16   "0.40 - 4.50 mIU/L Final   Office Visit on 01/06/2022   Component Date Value Ref Range Status    POC Rapid COVID 01/07/2022 Negative  Negative Final     Acceptable 01/07/2022 Yes   Final   Lab Visit on 01/03/2022   Component Date Value Ref Range Status    SARS-CoV2 (COVID-19) Qualitative P* 01/03/2022 Not Detected   Final       Past Medical History:   Diagnosis Date    Allergy     Migraine      Past Surgical History:   Procedure Laterality Date    facial biopsy Right 08/15/2021    right forehead biopsy result basal cell carcinoma    TONSILLECTOMY      TUBAL LIGATION       Family History   Problem Relation Age of Onset    Cancer Mother 42        breast cancer    Heart disease Father         quadrouple bypass    Heart disease Maternal Grandmother     COPD Maternal Grandfather     Heart disease Paternal Grandmother        Marital Status:   Alcohol History:  reports current alcohol use.  Tobacco History:  reports that she is a non-smoker but has been exposed to tobacco smoke. She has never used smokeless tobacco.  Drug History:  reports no history of drug use.    Review of patient's allergies indicates:   Allergen Reactions    Pcn [penicillins] Hives and Nausea Only    Hydrocodone Nausea Only       Current Outpatient Medications:     gabapentin (NEURONTIN) 300 MG capsule, Take 1 capsule (300 mg total) by mouth every evening., Disp: 30 capsule, Rfl: 2    Review of Systems   Respiratory: Positive for chest tightness.    Cardiovascular: Positive for chest pain.   Gastrointestinal: Positive for blood in stool (BRBPR).   Neurological: Positive for dizziness and headaches.          Objective:      Vitals:    06/02/22 1051   BP: 100/78   Pulse: 84   SpO2: 99%   Weight: 67.6 kg (149 lb)   Height: 5' 5" (1.651 m)     Physical Exam  Constitutional:       General: She is not in acute distress.     Appearance: She is well-developed.   HENT:      Head: Normocephalic and atraumatic.   Eyes:      " Conjunctiva/sclera: Conjunctivae normal.      Pupils: Pupils are equal, round, and reactive to light.   Neck:      Thyroid: No thyromegaly.   Cardiovascular:      Rate and Rhythm: Normal rate and regular rhythm.      Heart sounds: Normal heart sounds.   Pulmonary:      Effort: Pulmonary effort is normal.      Breath sounds: Normal breath sounds.   Abdominal:      General: Bowel sounds are normal. There is no distension.      Palpations: Abdomen is soft.      Tenderness: There is no abdominal tenderness.   Musculoskeletal:         General: Normal range of motion.      Cervical back: Normal range of motion and neck supple.   Skin:     General: Skin is warm and dry.      Findings: No erythema.   Neurological:      Mental Status: She is alert and oriented to person, place, and time.      Cranial Nerves: No cranial nerve deficit.           Assessment:       1. Chest pain, unspecified type    2. Dizziness         Plan:       Chest pain, unspecified type  Comments:  Labs all appear stable at this time. nothing urgent. EKG with nonspecific changes. she is scheduled for stress test early next week    Dizziness  Comments:  unclear etiology at this time. hydration has been stressed. consider carotid dopplers/brain imaging if sxs persist      Follow up in about 4 weeks (around 6/30/2022).        6/2/2022 Jorge Villeda PA-C

## 2022-06-08 ENCOUNTER — CLINICAL SUPPORT (OUTPATIENT)
Dept: CARDIOLOGY | Facility: HOSPITAL | Age: 67
End: 2022-06-08
Attending: PHYSICIAN ASSISTANT
Payer: MEDICARE

## 2022-06-08 DIAGNOSIS — R07.9 CHEST PAIN, UNSPECIFIED TYPE: ICD-10-CM

## 2022-06-08 LAB
CV STRESS BASE HR: 78 BPM
DIASTOLIC BLOOD PRESSURE: 86 MMHG
OHS CV CPX 1 MINUTE RECOVERY HEART RATE: 124 BPM
OHS CV CPX 85 PERCENT MAX PREDICTED HEART RATE MALE: 126
OHS CV CPX ESTIMATED METS: 5
OHS CV CPX MAX PREDICTED HEART RATE: 148
OHS CV CPX PATIENT IS FEMALE: 1
OHS CV CPX PATIENT IS MALE: 0
OHS CV CPX PEAK DIASTOLIC BLOOD PRESSURE: 68 MMHG
OHS CV CPX PEAK HEAR RATE: 164 BPM
OHS CV CPX PEAK RATE PRESSURE PRODUCT: NORMAL
OHS CV CPX PEAK SYSTOLIC BLOOD PRESSURE: 173 MMHG
OHS CV CPX PERCENT MAX PREDICTED HEART RATE ACHIEVED: 111
OHS CV CPX RATE PRESSURE PRODUCT PRESENTING: NORMAL
STRESS ECHO POST EXERCISE DUR MIN: 3 MINUTES
STRESS ST DEPRESSION: 0.6 MM
SYSTOLIC BLOOD PRESSURE: 149 MMHG

## 2022-06-08 PROCEDURE — 93016 EXERCISE STRESS - EKG (CUPID ONLY): ICD-10-PCS | Mod: ,,, | Performed by: INTERNAL MEDICINE

## 2022-06-08 PROCEDURE — 93017 CV STRESS TEST TRACING ONLY: CPT

## 2022-06-08 PROCEDURE — 93016 CV STRESS TEST SUPVJ ONLY: CPT | Mod: ,,, | Performed by: INTERNAL MEDICINE

## 2022-06-08 PROCEDURE — 93018 CV STRESS TEST I&R ONLY: CPT | Mod: ,,, | Performed by: INTERNAL MEDICINE

## 2022-06-08 PROCEDURE — 93018 EXERCISE STRESS - EKG (CUPID ONLY): ICD-10-PCS | Mod: ,,, | Performed by: INTERNAL MEDICINE

## 2022-06-09 ENCOUNTER — TELEPHONE (OUTPATIENT)
Dept: FAMILY MEDICINE | Facility: CLINIC | Age: 67
End: 2022-06-09

## 2022-06-09 NOTE — TELEPHONE ENCOUNTER
My recommendation would be to proceed with angiography. Tried to call patient myself and got no answer--told her to call me back tomorrow if she could. Just trying to see what she is open to before trying to get either of these tests set up.  My question is would the cardiologist rather meet with her 1st and then schedule angiogram or do they want to just set this up since she has already had inconclusive stress test?

## 2022-06-09 NOTE — TELEPHONE ENCOUNTER
----- Message from Maya Leone sent at 6/9/2022  2:29 PM CDT -----  Héctor MAGALLANES at [facility] calling said Dr. BERTRAND Morejon read the report and is recommending a stress bebeto scan or an angiogram done on this pt.  He did not leave a cb #

## 2022-06-09 NOTE — TELEPHONE ENCOUNTER
----- Message from Jorge Villeda PA-C sent at 6/9/2022  1:07 PM CDT -----  Please let patient know that the EKG portion was abnormal but not necessarily conclusive of ischemia.  We need to obtain further myocardial perfusion imaging verses coronary angiography for further evaluation.  I would recommend that she get in with Cardiology ASAP.  Dr. Timothy Garcia or Dr. Sue would be great.  We can place this referral now if she is open to it

## 2022-06-09 NOTE — NURSING NOTE
BONILLA Salamanca notified through  at [facility] of Dr BERTRAND Morejon's recommendations post stress test.

## 2022-07-07 ENCOUNTER — OFFICE VISIT (OUTPATIENT)
Dept: FAMILY MEDICINE | Facility: CLINIC | Age: 67
End: 2022-07-07
Payer: MEDICARE

## 2022-07-07 VITALS
TEMPERATURE: 98 F | SYSTOLIC BLOOD PRESSURE: 118 MMHG | WEIGHT: 151 LBS | HEART RATE: 72 BPM | BODY MASS INDEX: 25.16 KG/M2 | HEIGHT: 65 IN | DIASTOLIC BLOOD PRESSURE: 76 MMHG

## 2022-07-07 DIAGNOSIS — R94.31 ABNORMAL ELECTROCARDIOGRAM (ECG) (EKG): ICD-10-CM

## 2022-07-07 DIAGNOSIS — H61.21 HEARING LOSS DUE TO CERUMEN IMPACTION, RIGHT: ICD-10-CM

## 2022-07-07 DIAGNOSIS — R07.9 CHEST PAIN, UNSPECIFIED TYPE: Primary | ICD-10-CM

## 2022-07-07 DIAGNOSIS — N95.9 POSTMENOPAUSAL SYMPTOMS: ICD-10-CM

## 2022-07-07 PROCEDURE — 1159F PR MEDICATION LIST DOCUMENTED IN MEDICAL RECORD: ICD-10-PCS | Mod: CPTII,S$GLB,, | Performed by: PHYSICIAN ASSISTANT

## 2022-07-07 PROCEDURE — 3078F PR MOST RECENT DIASTOLIC BLOOD PRESSURE < 80 MM HG: ICD-10-PCS | Mod: CPTII,S$GLB,, | Performed by: PHYSICIAN ASSISTANT

## 2022-07-07 PROCEDURE — 3288F FALL RISK ASSESSMENT DOCD: CPT | Mod: CPTII,S$GLB,, | Performed by: PHYSICIAN ASSISTANT

## 2022-07-07 PROCEDURE — 1101F PT FALLS ASSESS-DOCD LE1/YR: CPT | Mod: CPTII,S$GLB,, | Performed by: PHYSICIAN ASSISTANT

## 2022-07-07 PROCEDURE — 99214 PR OFFICE/OUTPT VISIT, EST, LEVL IV, 30-39 MIN: ICD-10-PCS | Mod: S$GLB,,, | Performed by: PHYSICIAN ASSISTANT

## 2022-07-07 PROCEDURE — 3008F BODY MASS INDEX DOCD: CPT | Mod: CPTII,S$GLB,, | Performed by: PHYSICIAN ASSISTANT

## 2022-07-07 PROCEDURE — 3288F PR FALLS RISK ASSESSMENT DOCUMENTED: ICD-10-PCS | Mod: CPTII,S$GLB,, | Performed by: PHYSICIAN ASSISTANT

## 2022-07-07 PROCEDURE — 3074F PR MOST RECENT SYSTOLIC BLOOD PRESSURE < 130 MM HG: ICD-10-PCS | Mod: CPTII,S$GLB,, | Performed by: PHYSICIAN ASSISTANT

## 2022-07-07 PROCEDURE — 3074F SYST BP LT 130 MM HG: CPT | Mod: CPTII,S$GLB,, | Performed by: PHYSICIAN ASSISTANT

## 2022-07-07 PROCEDURE — 1159F MED LIST DOCD IN RCRD: CPT | Mod: CPTII,S$GLB,, | Performed by: PHYSICIAN ASSISTANT

## 2022-07-07 PROCEDURE — 1101F PR PT FALLS ASSESS DOC 0-1 FALLS W/OUT INJ PAST YR: ICD-10-PCS | Mod: CPTII,S$GLB,, | Performed by: PHYSICIAN ASSISTANT

## 2022-07-07 PROCEDURE — 3078F DIAST BP <80 MM HG: CPT | Mod: CPTII,S$GLB,, | Performed by: PHYSICIAN ASSISTANT

## 2022-07-07 PROCEDURE — 99214 OFFICE O/P EST MOD 30 MIN: CPT | Mod: S$GLB,,, | Performed by: PHYSICIAN ASSISTANT

## 2022-07-07 PROCEDURE — 3008F PR BODY MASS INDEX (BMI) DOCUMENTED: ICD-10-PCS | Mod: CPTII,S$GLB,, | Performed by: PHYSICIAN ASSISTANT

## 2022-07-07 NOTE — PROGRESS NOTES
"  SUBJECTIVE:    Patient ID: Elle Hernandez is a 67 y.o. female.    Chief Complaint: Follow-up    This is a 67-year-old female who presents for 4 week follow-up. Prior note "presents today with chief complaint of chest pain for about 1 month now.  Reports nausea associated.  She describes a metallic taste in her mouth, feels hungry and then after she eats she is nauseous and has a tightness in her chest.  The symptoms have been reproduced when bending down.  At time she describes a stabbing pain under her breast can be left-sided or right-sided she has no motivation and says while driving long distances she has started feeling fuzzy".  She did complete a treadmill stress test which was inconclusive.  She had some nonspecific ST T changes.  The cardiologist did recommend Lexiscan Myoview or definitive angiography.  Today patient reports that she is open to get the follow-up test. Some stress with her mother's health. Having to be back and forth to [place] every other week. More tension type HA lately to note.     RT ear feels "blocked" with reduction in hearing. Dr. Mark used to clean out her ears from time to time.      Clinical Support on 06/08/2022   Component Date Value Ref Range Status    85% Max Predicted HR 06/08/2022 126   Final    Max Predicted HR 06/08/2022 148   Final    OHS CV CPX PATIENT IS MALE 06/08/2022 0.0   Final    OHS CV CPX PATIENT IS FEMALE 06/08/2022 1.0   Final    Exercise duration (min) 06/08/2022 3  minutes Final    HR at rest 06/08/2022 78  bpm Final    Systolic blood pressure 06/08/2022 149  mmHg Final    Diastolic blood pressure 06/08/2022 86  mmHg Final    RPP 06/08/2022 11,622   Final    Peak HR 06/08/2022 164  bpm Final    Peak Systolic BP 06/08/2022 173  mmHg Final    Peak Diatolic BP 06/08/2022 68  mmHg Final    Peak RPP 06/08/2022 28,372   Final    Estimated METs 06/08/2022 5   Final    % Max HR Achieved 06/08/2022 111   Final    1 Minute Recovery HR 06/08/2022 124  " bpm Final    ST Depression (mm) 06/08/2022 0.6  mm Final   Office Visit on 05/09/2022   Component Date Value Ref Range Status    WBC 05/09/2022 5.4  3.8 - 10.8 Thousand/uL Final    RBC 05/09/2022 4.45  3.80 - 5.10 Million/uL Final    Hemoglobin 05/09/2022 14.0  11.7 - 15.5 g/dL Final    Hematocrit 05/09/2022 40.9  35.0 - 45.0 % Final    MCV 05/09/2022 91.9  80.0 - 100.0 fL Final    MCH 05/09/2022 31.5  27.0 - 33.0 pg Final    MCHC 05/09/2022 34.2  32.0 - 36.0 g/dL Final    RDW 05/09/2022 12.5  11.0 - 15.0 % Final    Platelets 05/09/2022 305  140 - 400 Thousand/uL Final    MPV 05/09/2022 8.7  7.5 - 12.5 fL Final    Neutrophils, Abs 05/09/2022 2,813  1,500 - 7,800 cells/uL Final    Lymph # 05/09/2022 1,993  850 - 3,900 cells/uL Final    Mono # 05/09/2022 432  200 - 950 cells/uL Final    Eos # 05/09/2022 130  15 - 500 cells/uL Final    Baso # 05/09/2022 32  0 - 200 cells/uL Final    Neutrophils Relative 05/09/2022 52.1  % Final    Lymph % 05/09/2022 36.9  % Final    Mono % 05/09/2022 8.0  % Final    Eosinophil % 05/09/2022 2.4  % Final    Basophil % 05/09/2022 0.6  % Final    Glucose 05/09/2022 92  65 - 99 mg/dL Final    BUN 05/09/2022 16  7 - 25 mg/dL Final    Creatinine 05/09/2022 0.91  0.50 - 0.99 mg/dL Final    eGFR if non  05/09/2022 66  > OR = 60 mL/min/1.73m2 Final    eGFR if  05/09/2022 76  > OR = 60 mL/min/1.73m2 Final    BUN/Creatinine Ratio 05/09/2022 NOT APPLICABLE  6 - 22 (calc) Final    Sodium 05/09/2022 139  135 - 146 mmol/L Final    Potassium 05/09/2022 4.7  3.5 - 5.3 mmol/L Final    Chloride 05/09/2022 101  98 - 110 mmol/L Final    CO2 05/09/2022 27  20 - 32 mmol/L Final    Calcium 05/09/2022 9.9  8.6 - 10.4 mg/dL Final    Total Protein 05/09/2022 6.9  6.1 - 8.1 g/dL Final    Albumin 05/09/2022 4.6  3.6 - 5.1 g/dL Final    Globulin, Total 05/09/2022 2.3  1.9 - 3.7 g/dL (calc) Final    Albumin/Globulin Ratio 05/09/2022 2.0  1.0 - 2.5  (calc) Final    Total Bilirubin 05/09/2022 0.5  0.2 - 1.2 mg/dL Final    Alkaline Phosphatase 05/09/2022 75  37 - 153 U/L Final    AST 05/09/2022 15  10 - 35 U/L Final    ALT 05/09/2022 10  6 - 29 U/L Final    Cholesterol 05/09/2022 219 (A) <200 mg/dL Final    HDL 05/09/2022 78  > OR = 50 mg/dL Final    Triglycerides 05/09/2022 88  <150 mg/dL Final    LDL Cholesterol 05/09/2022 122 (A) mg/dL (calc) Final    HDL/Cholesterol Ratio 05/09/2022 2.8  <5.0 (calc) Final    Non HDL Chol. (LDL+VLDL) 05/09/2022 141 (A) <130 mg/dL (calc) Final    TSH w/reflex to FT4 05/09/2022 1.16  0.40 - 4.50 mIU/L Final       Past Medical History:   Diagnosis Date    Allergy     Migraine      Past Surgical History:   Procedure Laterality Date    facial biopsy Right 08/15/2021    right forehead biopsy result basal cell carcinoma    TONSILLECTOMY      TUBAL LIGATION       Family History   Problem Relation Age of Onset    Cancer Mother 42        breast cancer    Heart disease Father         quadrouple bypass    Heart disease Maternal Grandmother     COPD Maternal Grandfather     Heart disease Paternal Grandmother        Marital Status:   Alcohol History:  reports current alcohol use.  Tobacco History:  reports that she is a non-smoker but has been exposed to tobacco smoke. She has never used smokeless tobacco.  Drug History:  reports no history of drug use.    Review of patient's allergies indicates:   Allergen Reactions    Pcn [penicillins] Hives and Nausea Only    Hydrocodone Nausea Only       Current Outpatient Medications:     gabapentin (NEURONTIN) 300 MG capsule, Take 1 capsule (300 mg total) by mouth every evening., Disp: 30 capsule, Rfl: 2    Review of Systems   Respiratory: Positive for chest tightness.    Cardiovascular: Positive for chest pain.   Gastrointestinal: Positive for blood in stool (BRBPR).   Neurological: Positive for dizziness and headaches.          Objective:      Vitals:    07/07/22 1500  "  BP: 118/76   Pulse: 72   Temp: 98 °F (36.7 °C)   Weight: 68.5 kg (151 lb)   Height: 5' 5" (1.651 m)     Physical Exam  Constitutional:       General: She is not in acute distress.     Appearance: She is well-developed.   HENT:      Head: Normocephalic and atraumatic.      Right Ear: There is impacted cerumen.   Eyes:      Conjunctiva/sclera: Conjunctivae normal.      Pupils: Pupils are equal, round, and reactive to light.   Neck:      Thyroid: No thyromegaly.   Cardiovascular:      Rate and Rhythm: Normal rate and regular rhythm.      Heart sounds: Normal heart sounds.   Pulmonary:      Effort: Pulmonary effort is normal.      Breath sounds: Normal breath sounds.   Abdominal:      General: Bowel sounds are normal. There is no distension.      Palpations: Abdomen is soft.      Tenderness: There is no abdominal tenderness.   Musculoskeletal:         General: Normal range of motion.      Cervical back: Normal range of motion and neck supple.   Skin:     General: Skin is warm and dry.      Findings: No erythema.   Neurological:      Mental Status: She is alert and oriented to person, place, and time.      Cranial Nerves: No cranial nerve deficit.           Assessment:       1. Chest pain, unspecified type    2. Postmenopausal symptoms    3. Hearing loss due to cerumen impaction, right    4. Abnormal electrocardiogram (ECG) (EKG)         Plan:       Chest pain, unspecified type  Comments:  Nonspecific findings on exercise stress.  Will need to get [facility]  Orders:  -     NM Myocardial Perfusion Spect Multi Pharmacologic; Future; Expected date: 07/07/2022  -     Nuclear Stress Test; Future    Postmenopausal symptoms  Comments:  Patient wishes for referral for Re[facility] clinic to discuss possible BioTe. would not recommend estrogen therapy with a nother positive for breast CA    Hearing loss due to cerumen impaction, right  Comments:  Unable to curette in clinic.  Patient impaction " removed via irrigation    Abnormal electrocardiogram (ECG) (EKG)  -     NM Myocardial Perfusion Spect Multi Pharmacologic; Future; Expected date: 07/07/2022      Follow up in about 8 weeks (around 9/1/2022).        7/7/2022 Jorge Villeda PA-C

## 2022-08-11 ENCOUNTER — PATIENT MESSAGE (OUTPATIENT)
Dept: FAMILY MEDICINE | Facility: CLINIC | Age: 67
End: 2022-08-11

## 2022-08-11 DIAGNOSIS — G62.9 NEUROPATHY: ICD-10-CM

## 2022-08-11 RX ORDER — GABAPENTIN 100 MG/1
200 CAPSULE ORAL NIGHTLY
Qty: 60 CAPSULE | Refills: 2 | Status: SHIPPED | OUTPATIENT
Start: 2022-08-11 | End: 2023-05-09

## 2022-08-12 ENCOUNTER — OFFICE VISIT (OUTPATIENT)
Dept: FAMILY MEDICINE | Facility: CLINIC | Age: 67
End: 2022-08-12
Payer: MEDICARE

## 2022-08-12 ENCOUNTER — PATIENT MESSAGE (OUTPATIENT)
Dept: FAMILY MEDICINE | Facility: CLINIC | Age: 67
End: 2022-08-12

## 2022-08-12 VITALS
WEIGHT: 150 LBS | DIASTOLIC BLOOD PRESSURE: 80 MMHG | TEMPERATURE: 98 F | BODY MASS INDEX: 24.99 KG/M2 | SYSTOLIC BLOOD PRESSURE: 112 MMHG | OXYGEN SATURATION: 98 % | HEART RATE: 80 BPM | HEIGHT: 65 IN

## 2022-08-12 DIAGNOSIS — F51.01 PRIMARY INSOMNIA: ICD-10-CM

## 2022-08-12 DIAGNOSIS — E78.2 MIXED HYPERLIPIDEMIA: ICD-10-CM

## 2022-08-12 DIAGNOSIS — K57.92 DIVERTICULITIS: Primary | ICD-10-CM

## 2022-08-12 DIAGNOSIS — K21.9 GASTROESOPHAGEAL REFLUX DISEASE WITHOUT ESOPHAGITIS: ICD-10-CM

## 2022-08-12 DIAGNOSIS — G62.9 NEUROPATHY: ICD-10-CM

## 2022-08-12 PROCEDURE — 3074F PR MOST RECENT SYSTOLIC BLOOD PRESSURE < 130 MM HG: ICD-10-PCS | Mod: CPTII,S$GLB,, | Performed by: PHYSICIAN ASSISTANT

## 2022-08-12 PROCEDURE — 1160F PR REVIEW ALL MEDS BY PRESCRIBER/CLIN PHARMACIST DOCUMENTED: ICD-10-PCS | Mod: CPTII,S$GLB,, | Performed by: PHYSICIAN ASSISTANT

## 2022-08-12 PROCEDURE — 3288F FALL RISK ASSESSMENT DOCD: CPT | Mod: CPTII,S$GLB,, | Performed by: PHYSICIAN ASSISTANT

## 2022-08-12 PROCEDURE — 1159F PR MEDICATION LIST DOCUMENTED IN MEDICAL RECORD: ICD-10-PCS | Mod: CPTII,S$GLB,, | Performed by: PHYSICIAN ASSISTANT

## 2022-08-12 PROCEDURE — 1160F RVW MEDS BY RX/DR IN RCRD: CPT | Mod: CPTII,S$GLB,, | Performed by: PHYSICIAN ASSISTANT

## 2022-08-12 PROCEDURE — 1125F AMNT PAIN NOTED PAIN PRSNT: CPT | Mod: CPTII,S$GLB,, | Performed by: PHYSICIAN ASSISTANT

## 2022-08-12 PROCEDURE — 3008F PR BODY MASS INDEX (BMI) DOCUMENTED: ICD-10-PCS | Mod: CPTII,S$GLB,, | Performed by: PHYSICIAN ASSISTANT

## 2022-08-12 PROCEDURE — 99213 PR OFFICE/OUTPT VISIT, EST, LEVL III, 20-29 MIN: ICD-10-PCS | Mod: S$GLB,,, | Performed by: PHYSICIAN ASSISTANT

## 2022-08-12 PROCEDURE — 3074F SYST BP LT 130 MM HG: CPT | Mod: CPTII,S$GLB,, | Performed by: PHYSICIAN ASSISTANT

## 2022-08-12 PROCEDURE — 1101F PT FALLS ASSESS-DOCD LE1/YR: CPT | Mod: CPTII,S$GLB,, | Performed by: PHYSICIAN ASSISTANT

## 2022-08-12 PROCEDURE — 3079F PR MOST RECENT DIASTOLIC BLOOD PRESSURE 80-89 MM HG: ICD-10-PCS | Mod: CPTII,S$GLB,, | Performed by: PHYSICIAN ASSISTANT

## 2022-08-12 PROCEDURE — 1101F PR PT FALLS ASSESS DOC 0-1 FALLS W/OUT INJ PAST YR: ICD-10-PCS | Mod: CPTII,S$GLB,, | Performed by: PHYSICIAN ASSISTANT

## 2022-08-12 PROCEDURE — 3288F PR FALLS RISK ASSESSMENT DOCUMENTED: ICD-10-PCS | Mod: CPTII,S$GLB,, | Performed by: PHYSICIAN ASSISTANT

## 2022-08-12 PROCEDURE — 99213 OFFICE O/P EST LOW 20 MIN: CPT | Mod: S$GLB,,, | Performed by: PHYSICIAN ASSISTANT

## 2022-08-12 PROCEDURE — 3079F DIAST BP 80-89 MM HG: CPT | Mod: CPTII,S$GLB,, | Performed by: PHYSICIAN ASSISTANT

## 2022-08-12 PROCEDURE — 1159F MED LIST DOCD IN RCRD: CPT | Mod: CPTII,S$GLB,, | Performed by: PHYSICIAN ASSISTANT

## 2022-08-12 PROCEDURE — 3008F BODY MASS INDEX DOCD: CPT | Mod: CPTII,S$GLB,, | Performed by: PHYSICIAN ASSISTANT

## 2022-08-12 PROCEDURE — 1125F PR PAIN SEVERITY QUANTIFIED, PAIN PRESENT: ICD-10-PCS | Mod: CPTII,S$GLB,, | Performed by: PHYSICIAN ASSISTANT

## 2022-08-12 RX ORDER — METRONIDAZOLE 500 MG/1
500 TABLET ORAL 3 TIMES DAILY
Qty: 21 TABLET | Refills: 0 | Status: SHIPPED | OUTPATIENT
Start: 2022-08-12 | End: 2022-08-19

## 2022-08-12 RX ORDER — CIPROFLOXACIN 500 MG/1
500 TABLET ORAL 2 TIMES DAILY
Qty: 20 TABLET | Refills: 0 | Status: SHIPPED | OUTPATIENT
Start: 2022-08-12 | End: 2022-08-22

## 2022-08-12 NOTE — PROGRESS NOTES
"  SUBJECTIVE:    Patient ID: Elle Hernandez is a 67 y.o. female.    Chief Complaint: Abdominal Pain (Did not bring bottles//pt complains of having diverticulitis since last Friday//will take PNA//bs)    Pt is a 67 y.o. female who presents today for a sick visit with a CC of "abdominal pain." 6 days ago, she experienced a gradual onset of a dull achy pain located in the bilateral lower quadrants, left side > right.  She reports having a history of diverticulitis and states this pain feels similar to previous episodes. The pain is rated as a 5/10 and is constantly present.  It tends to be exacerbated when laying supine or when pressure is applied to the lower abdominal quadrants.  In an attempt to alleviate this pain, she took only one dose of an old Augmentin prescription.  She denies any fever, painful BMs, blood, or mucus in the stool, flank pain, hematuria, or dysuria.  She maintains an adequate appetite, averaging to 2 meals/day of a Regular diet.  Weight is stable.    Clinical Support on 06/08/2022   Component Date Value Ref Range Status    85% Max Predicted HR 06/08/2022 126   Final    Max Predicted HR 06/08/2022 148   Final    OHS CV CPX PATIENT IS MALE 06/08/2022 0.0   Final    OHS CV CPX PATIENT IS FEMALE 06/08/2022 1.0   Final    Exercise duration (min) 06/08/2022 3  minutes Final    HR at rest 06/08/2022 78  bpm Final    Systolic blood pressure 06/08/2022 149  mmHg Final    Diastolic blood pressure 06/08/2022 86  mmHg Final    RPP 06/08/2022 11,622   Final    Peak HR 06/08/2022 164  bpm Final    Peak Systolic BP 06/08/2022 173  mmHg Final    Peak Diatolic BP 06/08/2022 68  mmHg Final    Peak RPP 06/08/2022 28,372   Final    Estimated METs 06/08/2022 5   Final    % Max HR Achieved 06/08/2022 111   Final    1 Minute Recovery HR 06/08/2022 124  bpm Final    ST Depression (mm) 06/08/2022 0.6  mm Final   Office Visit on 05/09/2022   Component Date Value Ref Range Status    WBC 05/09/2022 " 5.4  3.8 - 10.8 Thousand/uL Final    RBC 05/09/2022 4.45  3.80 - 5.10 Million/uL Final    Hemoglobin 05/09/2022 14.0  11.7 - 15.5 g/dL Final    Hematocrit 05/09/2022 40.9  35.0 - 45.0 % Final    MCV 05/09/2022 91.9  80.0 - 100.0 fL Final    MCH 05/09/2022 31.5  27.0 - 33.0 pg Final    MCHC 05/09/2022 34.2  32.0 - 36.0 g/dL Final    RDW 05/09/2022 12.5  11.0 - 15.0 % Final    Platelets 05/09/2022 305  140 - 400 Thousand/uL Final    MPV 05/09/2022 8.7  7.5 - 12.5 fL Final    Neutrophils, Abs 05/09/2022 2,813  1,500 - 7,800 cells/uL Final    Lymph # 05/09/2022 1,993  850 - 3,900 cells/uL Final    Mono # 05/09/2022 432  200 - 950 cells/uL Final    Eos # 05/09/2022 130  15 - 500 cells/uL Final    Baso # 05/09/2022 32  0 - 200 cells/uL Final    Neutrophils Relative 05/09/2022 52.1  % Final    Lymph % 05/09/2022 36.9  % Final    Mono % 05/09/2022 8.0  % Final    Eosinophil % 05/09/2022 2.4  % Final    Basophil % 05/09/2022 0.6  % Final    Glucose 05/09/2022 92  65 - 99 mg/dL Final    BUN 05/09/2022 16  7 - 25 mg/dL Final    Creatinine 05/09/2022 0.91  0.50 - 0.99 mg/dL Final    eGFR if non  05/09/2022 66  > OR = 60 mL/min/1.73m2 Final    eGFR if  05/09/2022 76  > OR = 60 mL/min/1.73m2 Final    BUN/Creatinine Ratio 05/09/2022 NOT APPLICABLE  6 - 22 (calc) Final    Sodium 05/09/2022 139  135 - 146 mmol/L Final    Potassium 05/09/2022 4.7  3.5 - 5.3 mmol/L Final    Chloride 05/09/2022 101  98 - 110 mmol/L Final    CO2 05/09/2022 27  20 - 32 mmol/L Final    Calcium 05/09/2022 9.9  8.6 - 10.4 mg/dL Final    Total Protein 05/09/2022 6.9  6.1 - 8.1 g/dL Final    Albumin 05/09/2022 4.6  3.6 - 5.1 g/dL Final    Globulin, Total 05/09/2022 2.3  1.9 - 3.7 g/dL (calc) Final    Albumin/Globulin Ratio 05/09/2022 2.0  1.0 - 2.5 (calc) Final    Total Bilirubin 05/09/2022 0.5  0.2 - 1.2 mg/dL Final    Alkaline Phosphatase 05/09/2022 75  37 - 153 U/L Final    AST 05/09/2022  15  10 - 35 U/L Final    ALT 05/09/2022 10  6 - 29 U/L Final    Cholesterol 05/09/2022 219 (A) <200 mg/dL Final    HDL 05/09/2022 78  > OR = 50 mg/dL Final    Triglycerides 05/09/2022 88  <150 mg/dL Final    LDL Cholesterol 05/09/2022 122 (A) mg/dL (calc) Final    HDL/Cholesterol Ratio 05/09/2022 2.8  <5.0 (calc) Final    Non HDL Chol. (LDL+VLDL) 05/09/2022 141 (A) <130 mg/dL (calc) Final    TSH w/reflex to FT4 05/09/2022 1.16  0.40 - 4.50 mIU/L Final       Past Medical History:   Diagnosis Date    Allergy     Migraine      Past Surgical History:   Procedure Laterality Date    facial biopsy Right 08/15/2021    right forehead biopsy result basal cell carcinoma    TONSILLECTOMY      TUBAL LIGATION       Family History   Problem Relation Age of Onset    Cancer Mother 42        breast cancer    Heart disease Father         quadrouple bypass    Heart disease Maternal Grandmother     COPD Maternal Grandfather     Heart disease Paternal Grandmother        Marital Status:   Alcohol History:  reports current alcohol use.  Tobacco History:  reports that she is a non-smoker but has been exposed to tobacco smoke. She has never used smokeless tobacco.  Drug History:  reports no history of drug use.    Health Maintenance Topics with due status: Not Due       Topic Last Completion Date    Colorectal Cancer Screening 05/28/2019    Influenza Vaccine 10/03/2021    DEXA Scan 11/04/2021    TETANUS VACCINE 03/29/2022    Lipid Panel 05/09/2022     Immunization History   Administered Date(s) Administered    COVID-19, MRNA, LN-S, PF (MODERNA FULL 0.5 ML DOSE) 07/09/2021, 08/05/2021    Influenza - Quadrivalent - [facility] - PF 10/11/2018, 10/03/2021    Influenza - Trivalent - PF (ADULT) 10/11/2018    Influenza A (H1N1) 2009 Monovalent - IM 11/17/2009    Td (ADULT) 03/29/2022       Review of patient's allergies indicates:   Allergen Reactions    Pcn [penicillins] Hives and Nausea Only    Hydrocodone Nausea Only  "      Current Outpatient Medications:     ciprofloxacin HCl (CIPRO) 500 MG tablet, Take 1 tablet (500 mg total) by mouth 2 (two) times daily. for 10 days, Disp: 20 tablet, Rfl: 0    gabapentin (NEURONTIN) 100 MG capsule, Take 2 capsules (200 mg total) by mouth every evening., Disp: 60 capsule, Rfl: 2    metroNIDAZOLE (FLAGYL) 500 MG tablet, Take 1 tablet (500 mg total) by mouth 3 (three) times daily. for 7 days, Disp: 21 tablet, Rfl: 0    Review of Systems   Constitutional: Negative for activity change, appetite change, chills, fatigue, fever and unexpected weight change.   Respiratory: Negative for cough, shortness of breath and wheezing.    Cardiovascular: Negative for chest pain, palpitations and leg swelling.   Gastrointestinal: Positive for abdominal pain. Negative for abdominal distention, anal bleeding, blood in stool, constipation, diarrhea, nausea, rectal pain and vomiting.   Genitourinary: Negative for difficulty urinating, dysuria, flank pain, frequency, hematuria and urgency.   Musculoskeletal: Negative for arthralgias and myalgias.   Neurological: Negative for dizziness, syncope and light-headedness.   Psychiatric/Behavioral: Negative for behavioral problems.          Objective:      Vitals:    08/12/22 1122   BP: 112/80   Pulse: 80   Temp: 97.8 °F (36.6 °C)   SpO2: 98%   Weight: 68 kg (150 lb)   Height: 5' 5" (1.651 m)     Physical Exam  Vitals and nursing note reviewed.   Constitutional:       General: She is not in acute distress.     Appearance: Normal appearance. She is normal weight. She is not ill-appearing, toxic-appearing or diaphoretic.      Comments: Healthy, well-nourished WF sitting erect in an office chair in no acute distress.   HENT:      Head: Normocephalic and atraumatic.      Right Ear: External ear normal.      Left Ear: External ear normal.      Nose: No rhinorrhea.      Mouth/Throat:      Mouth: Mucous membranes are moist.      Pharynx: Oropharynx is clear.   Eyes:      General: " No scleral icterus.     Conjunctiva/sclera: Conjunctivae normal.   Cardiovascular:      Rate and Rhythm: Normal rate and regular rhythm.      Pulses: Normal pulses.      Heart sounds: Normal heart sounds. No murmur heard.    No friction rub.   Pulmonary:      Effort: Pulmonary effort is normal. No respiratory distress.      Breath sounds: Normal breath sounds. No wheezing, rhonchi or rales.   Abdominal:      General: Bowel sounds are normal. There is no distension.      Palpations: Abdomen is soft. There is no shifting dullness, fluid wave, hepatomegaly, splenomegaly or mass.      Tenderness: There is abdominal tenderness (To deep palpation in the left lower quadrant.) in the left lower quadrant. There is no right CVA tenderness, left CVA tenderness, guarding or rebound. Negative signs include Yeung's sign, Rovsing's sign, McBurney's sign, psoas sign and obturator sign.      Hernia: No hernia is present.      Comments: Active bowel sounds appreciated in all 4 quadrants and epigastrium.   Musculoskeletal:      Cervical back: Normal range of motion.      Right lower leg: No edema.      Left lower leg: No edema.   Skin:     General: Skin is warm and dry.      Coloration: Skin is not jaundiced.      Findings: No bruising or rash.   Neurological:      General: No focal deficit present.      Mental Status: She is alert. Mental status is at baseline.      Cranial Nerves: No cranial nerve deficit.      Motor: No weakness.      Coordination: Coordination normal.      Gait: Gait normal.   Psychiatric:         Mood and Affect: Mood normal.         Behavior: Behavior is cooperative.           Assessment:       1. Diverticulitis    2. Neuropathy    3. Primary insomnia    4. Mixed hyperlipidemia    5. Gastroesophageal reflux disease without esophagitis           Plan:       Diverticulitis  Will obtain CBC to trend WBC count and a BMP to assess kidney function/electrolytes status.    Start Cipro 500 mg b.i.d. x10 days and Flagyl  500 mg t.i.d. x7 days.  If symptoms worsen, present to the ER immediately.  Follow-up in 1 week for reassessment.  If pain is still present will obtain Abdominal CT scan  -     ciprofloxacin HCl (CIPRO) 500 MG tablet; Take 1 tablet (500 mg total) by mouth 2 (two) times daily. for 10 days  Dispense: 20 tablet; Refill: 0  -     metroNIDAZOLE (FLAGYL) 500 MG tablet; Take 1 tablet (500 mg total) by mouth 3 (three) times daily. for 7 days  Dispense: 21 tablet; Refill: 0  -     CBC Auto Differential; Future; Expected date: 08/12/2022  -     Basic Metabolic Panel; Future; Expected date: 08/12/2022    Neuropathy    Primary insomnia    Mixed hyperlipidemia    Gastroesophageal reflux disease without esophagitis      Follow up in about 1 week (around 8/19/2022) for Diverticulitis .        8/12/2022 Evans Benson PA-C

## 2022-08-15 ENCOUNTER — PATIENT MESSAGE (OUTPATIENT)
Dept: FAMILY MEDICINE | Facility: CLINIC | Age: 67
End: 2022-08-15

## 2022-08-15 NOTE — TELEPHONE ENCOUNTER
Start OTC probiotic daily and increase daily fiber intake.  Avoid fried, greasy foods.  Remain well hydrated, alternating water with Pedialyte. Avoid anti-motility medications (ex. Imodium), as these agents can worsen infectious causes like Diverticulitis.   Continue with the current antibiotic regimen as is.  Alternative antibiotics for diverticulitis have similar, if not greater, side effects of diarrhea.   Complete the lab work that was ordered on office visit, so I can assess WBC count.

## 2022-08-17 ENCOUNTER — PATIENT MESSAGE (OUTPATIENT)
Dept: FAMILY MEDICINE | Facility: CLINIC | Age: 67
End: 2022-08-17

## 2022-08-17 DIAGNOSIS — R19.7 DIARRHEA, UNSPECIFIED TYPE: Primary | ICD-10-CM

## 2022-08-17 LAB
BASOPHILS # BLD AUTO: 39 CELLS/UL (ref 0–200)
BASOPHILS NFR BLD AUTO: 0.5 %
BUN SERPL-MCNC: 17 MG/DL (ref 7–25)
BUN/CREAT SERPL: NORMAL (CALC) (ref 6–22)
CALCIUM SERPL-MCNC: 9.2 MG/DL (ref 8.6–10.4)
CHLORIDE SERPL-SCNC: 104 MMOL/L (ref 98–110)
CO2 SERPL-SCNC: 28 MMOL/L (ref 20–32)
CREAT SERPL-MCNC: 0.99 MG/DL (ref 0.5–1.05)
EGFR: 63 ML/MIN/1.73M2
EOSINOPHIL # BLD AUTO: 203 CELLS/UL (ref 15–500)
EOSINOPHIL NFR BLD AUTO: 2.6 %
ERYTHROCYTE [DISTWIDTH] IN BLOOD BY AUTOMATED COUNT: 12.5 % (ref 11–15)
GLUCOSE SERPL-MCNC: 89 MG/DL (ref 65–99)
HCT VFR BLD AUTO: 39.4 % (ref 35–45)
HGB BLD-MCNC: 13 G/DL (ref 11.7–15.5)
LYMPHOCYTES # BLD AUTO: 2457 CELLS/UL (ref 850–3900)
LYMPHOCYTES NFR BLD AUTO: 31.5 %
MCH RBC QN AUTO: 30.2 PG (ref 27–33)
MCHC RBC AUTO-ENTMCNC: 33 G/DL (ref 32–36)
MCV RBC AUTO: 91.6 FL (ref 80–100)
MONOCYTES # BLD AUTO: 538 CELLS/UL (ref 200–950)
MONOCYTES NFR BLD AUTO: 6.9 %
NEUTROPHILS # BLD AUTO: 4563 CELLS/UL (ref 1500–7800)
NEUTROPHILS NFR BLD AUTO: 58.5 %
PLATELET # BLD AUTO: 327 THOUSAND/UL (ref 140–400)
PMV BLD REES-ECKER: 8.9 FL (ref 7.5–12.5)
POTASSIUM SERPL-SCNC: 4.1 MMOL/L (ref 3.5–5.3)
RBC # BLD AUTO: 4.3 MILLION/UL (ref 3.8–5.1)
SODIUM SERPL-SCNC: 140 MMOL/L (ref 135–146)
WBC # BLD AUTO: 7.8 THOUSAND/UL (ref 3.8–10.8)

## 2022-08-17 RX ORDER — CHOLESTYRAMINE 4 G/9G
4 POWDER, FOR SUSPENSION ORAL 2 TIMES DAILY
Qty: 14 PACKET | Refills: 0 | Status: SHIPPED | OUTPATIENT
Start: 2022-08-17 | End: 2022-10-11

## 2022-08-17 NOTE — TELEPHONE ENCOUNTER
Please contact patient and inform her that her lab work was reviewed.  WBC count is within normal limits.  No signs of anemia noted.  Kidney function is within normal limits.  Electrolyte status are all within normal limits.  To help alleviate the diarrhea, I will send in Cholestyramine powder to be taken b.i.d. x 5-7 days.  This prescription was sent to her pharmacy listed.  Also, increase daily fiber intake and start OTC probiotic.

## 2022-08-18 ENCOUNTER — TELEPHONE (OUTPATIENT)
Dept: FAMILY MEDICINE | Facility: CLINIC | Age: 67
End: 2022-08-18

## 2022-08-18 NOTE — TELEPHONE ENCOUNTER
----- Message from BONILLA Aparicio sent at 8/17/2022  5:10 PM CDT -----  Please contact patient and inform her that her lab work was reviewed.  WBC count is within normal limits.  No signs of anemia noted.  Kidney function is within normal limits.  Electrolyte status are all within normal limits.

## 2022-08-18 NOTE — TELEPHONE ENCOUNTER
Spoke with pt in regards to recent lab results. Verbalized per Evans that her lab work was reviewed. Pt's  WBC count is within normal limits.  No signs of anemia noted.  Kidney function is within normal limits.  Electrolyte status are all within normal limits. Pt acknowledge understanding.

## 2022-08-21 ENCOUNTER — PATIENT MESSAGE (OUTPATIENT)
Dept: FAMILY MEDICINE | Facility: CLINIC | Age: 67
End: 2022-08-21

## 2022-08-22 ENCOUNTER — TELEPHONE (OUTPATIENT)
Dept: FAMILY MEDICINE | Facility: CLINIC | Age: 67
End: 2022-08-22

## 2022-08-22 DIAGNOSIS — R10.32 LEFT LOWER QUADRANT PAIN: ICD-10-CM

## 2022-08-22 NOTE — TELEPHONE ENCOUNTER
----- Message from Mali Betancourt sent at 8/22/2022 10:57 AM CDT -----  Vm- pt is still have stomach, back and left side pain. Does not have a f/u till the 30th. Would like to know if we can order the ct scan since she is still having symptoms   [phone]

## 2022-08-22 NOTE — TELEPHONE ENCOUNTER
----- Message from Joe García MA sent at 8/22/2022  9:50 AM CDT -----  Pt is calling for a C scan because she is still having the pains from the diverticulitis.  [phone]

## 2022-08-23 ENCOUNTER — PATIENT MESSAGE (OUTPATIENT)
Dept: FAMILY MEDICINE | Facility: CLINIC | Age: 67
End: 2022-08-23

## 2022-08-23 ENCOUNTER — TELEPHONE (OUTPATIENT)
Dept: FAMILY MEDICINE | Facility: CLINIC | Age: 67
End: 2022-08-23

## 2022-08-23 NOTE — TELEPHONE ENCOUNTER
Called pt on her home and cell phone to ask her which appt time she would like to keep on 8/30 because she is scheduled twice. Pt didn't answer so I left a message on both numbers.

## 2022-08-24 NOTE — TELEPHONE ENCOUNTER
Spoke with pt this morning and she chose the 10:30 appt. She has been removed from the 11:30 appt time slot.

## 2022-08-25 ENCOUNTER — HOSPITAL ENCOUNTER (OUTPATIENT)
Dept: RADIOLOGY | Facility: HOSPITAL | Age: 67
Discharge: HOME | End: 2022-08-25
Attending: PHYSICIAN ASSISTANT
Payer: MEDICARE

## 2022-08-25 DIAGNOSIS — R10.32 LEFT LOWER QUADRANT PAIN: ICD-10-CM

## 2022-08-25 PROCEDURE — 74176 CT ABD & PELVIS W/O CONTRAST: CPT | Mod: TC

## 2022-08-29 ENCOUNTER — TELEPHONE (OUTPATIENT)
Dept: FAMILY MEDICINE | Facility: CLINIC | Age: 67
End: 2022-08-29

## 2022-08-29 NOTE — TELEPHONE ENCOUNTER
----- Message from BONILLA Aparicio sent at 8/26/2022  3:08 PM CDT -----  Please contact patient and inform her that her CT abdomen was reviewed.  CT scan only revealed few, calcified uterine fibroids, but no signs of diverticulitis or other abnormalities noted.

## 2022-08-30 NOTE — TELEPHONE ENCOUNTER
Diverticulosis is an anatomical out pouching in the colon. It is an anatomical term, NOT an infectious disease. Diverticulitis is an active infection of one of these out pouching structure.  Your recent CT scan does NOT reveal any signs infection/diverticulitis.

## 2022-09-01 ENCOUNTER — PATIENT MESSAGE (OUTPATIENT)
Dept: FAMILY MEDICINE | Facility: CLINIC | Age: 67
End: 2022-09-01

## 2022-09-23 ENCOUNTER — IMMUNIZATION (OUTPATIENT)
Dept: URGENT CARE | Facility: CLINIC | Age: 67
End: 2022-09-23
Payer: MEDICARE

## 2022-09-23 PROCEDURE — G0008 PR ADMIN INFLUENZA VIRUS VAC: ICD-10-PCS | Mod: S$GLB,,, | Performed by: EMERGENCY MEDICINE

## 2022-09-23 PROCEDURE — 90686 IIV4 VACC NO PRSV 0.5 ML IM: CPT | Mod: S$GLB,,, | Performed by: EMERGENCY MEDICINE

## 2022-09-23 PROCEDURE — 90686 PR FLU VACCINE, QIIV4, NO PRSV, 0.5 ML, IM: ICD-10-PCS | Mod: S$GLB,,, | Performed by: EMERGENCY MEDICINE

## 2022-09-23 PROCEDURE — G0008 ADMIN INFLUENZA VIRUS VAC: HCPCS | Mod: S$GLB,,, | Performed by: EMERGENCY MEDICINE

## 2022-09-28 ENCOUNTER — PATIENT MESSAGE (OUTPATIENT)
Dept: FAMILY MEDICINE | Facility: CLINIC | Age: 67
End: 2022-09-28

## 2022-09-29 ENCOUNTER — PATIENT MESSAGE (OUTPATIENT)
Dept: FAMILY MEDICINE | Facility: CLINIC | Age: 67
End: 2022-09-29

## 2022-09-30 ENCOUNTER — PATIENT MESSAGE (OUTPATIENT)
Dept: FAMILY MEDICINE | Facility: CLINIC | Age: 67
End: 2022-09-30

## 2022-10-11 ENCOUNTER — OFFICE VISIT (OUTPATIENT)
Dept: FAMILY MEDICINE | Facility: CLINIC | Age: 67
End: 2022-10-11
Payer: MEDICARE

## 2022-10-11 ENCOUNTER — HOSPITAL ENCOUNTER (OUTPATIENT)
Dept: RADIOLOGY | Facility: HOSPITAL | Age: 67
Discharge: HOME | End: 2022-10-11
Attending: PHYSICIAN ASSISTANT
Payer: MEDICARE

## 2022-10-11 VITALS
WEIGHT: 150 LBS | SYSTOLIC BLOOD PRESSURE: 118 MMHG | HEART RATE: 80 BPM | HEIGHT: 65 IN | OXYGEN SATURATION: 99 % | DIASTOLIC BLOOD PRESSURE: 82 MMHG | BODY MASS INDEX: 24.99 KG/M2

## 2022-10-11 DIAGNOSIS — R40.0 DAYTIME SOMNOLENCE: Primary | ICD-10-CM

## 2022-10-11 DIAGNOSIS — S16.1XXA STRAIN OF NECK MUSCLE, INITIAL ENCOUNTER: ICD-10-CM

## 2022-10-11 PROCEDURE — 3079F PR MOST RECENT DIASTOLIC BLOOD PRESSURE 80-89 MM HG: ICD-10-PCS | Mod: CPTII,S$GLB,, | Performed by: PHYSICIAN ASSISTANT

## 2022-10-11 PROCEDURE — 3074F SYST BP LT 130 MM HG: CPT | Mod: CPTII,S$GLB,, | Performed by: PHYSICIAN ASSISTANT

## 2022-10-11 PROCEDURE — 3008F PR BODY MASS INDEX (BMI) DOCUMENTED: ICD-10-PCS | Mod: CPTII,S$GLB,, | Performed by: PHYSICIAN ASSISTANT

## 2022-10-11 PROCEDURE — 1159F MED LIST DOCD IN RCRD: CPT | Mod: CPTII,S$GLB,, | Performed by: PHYSICIAN ASSISTANT

## 2022-10-11 PROCEDURE — 1101F PT FALLS ASSESS-DOCD LE1/YR: CPT | Mod: CPTII,S$GLB,, | Performed by: PHYSICIAN ASSISTANT

## 2022-10-11 PROCEDURE — 1159F PR MEDICATION LIST DOCUMENTED IN MEDICAL RECORD: ICD-10-PCS | Mod: CPTII,S$GLB,, | Performed by: PHYSICIAN ASSISTANT

## 2022-10-11 PROCEDURE — 72050 X-RAY EXAM NECK SPINE 4/5VWS: CPT | Mod: TC,PO

## 2022-10-11 PROCEDURE — 3288F FALL RISK ASSESSMENT DOCD: CPT | Mod: CPTII,S$GLB,, | Performed by: PHYSICIAN ASSISTANT

## 2022-10-11 PROCEDURE — 3008F BODY MASS INDEX DOCD: CPT | Mod: CPTII,S$GLB,, | Performed by: PHYSICIAN ASSISTANT

## 2022-10-11 PROCEDURE — 3288F PR FALLS RISK ASSESSMENT DOCUMENTED: ICD-10-PCS | Mod: CPTII,S$GLB,, | Performed by: PHYSICIAN ASSISTANT

## 2022-10-11 PROCEDURE — 99214 PR OFFICE/OUTPT VISIT, EST, LEVL IV, 30-39 MIN: ICD-10-PCS | Mod: S$GLB,,, | Performed by: PHYSICIAN ASSISTANT

## 2022-10-11 PROCEDURE — 1101F PR PT FALLS ASSESS DOC 0-1 FALLS W/OUT INJ PAST YR: ICD-10-PCS | Mod: CPTII,S$GLB,, | Performed by: PHYSICIAN ASSISTANT

## 2022-10-11 PROCEDURE — 3079F DIAST BP 80-89 MM HG: CPT | Mod: CPTII,S$GLB,, | Performed by: PHYSICIAN ASSISTANT

## 2022-10-11 PROCEDURE — 99214 OFFICE O/P EST MOD 30 MIN: CPT | Mod: S$GLB,,, | Performed by: PHYSICIAN ASSISTANT

## 2022-10-11 PROCEDURE — 3074F PR MOST RECENT SYSTOLIC BLOOD PRESSURE < 130 MM HG: ICD-10-PCS | Mod: CPTII,S$GLB,, | Performed by: PHYSICIAN ASSISTANT

## 2022-10-11 NOTE — PROGRESS NOTES
SUBJECTIVE:    Patient ID: Elle Hernandez is a 67 y.o. female.    Chief Complaint: Follow-up (Headaches and nausea//no med bottles// lower back pain//tc/)    This is a 67-year-old female who presents today for regular follow-up.  Reports headaches and nausea at times. Wakes up every morning with a headache present and it gets better as the day goes on. Neck seems to be worsening also. Pain in the back of the neck almost daily. Images done through ortho but not of the neck. Does report heavy snoring and sometimes awakening herself due to snoring. Fogginess and general grogginess throughout the day. Deals with a good deal of stress with her mom's health.      Office Visit on 08/12/2022   Component Date Value Ref Range Status    WBC 08/16/2022 7.8  3.8 - 10.8 Thousand/uL Final    RBC 08/16/2022 4.30  3.80 - 5.10 Million/uL Final    Hemoglobin 08/16/2022 13.0  11.7 - 15.5 g/dL Final    Hematocrit 08/16/2022 39.4  35.0 - 45.0 % Final    MCV 08/16/2022 91.6  80.0 - 100.0 fL Final    MCH 08/16/2022 30.2  27.0 - 33.0 pg Final    MCHC 08/16/2022 33.0  32.0 - 36.0 g/dL Final    RDW 08/16/2022 12.5  11.0 - 15.0 % Final    Platelets 08/16/2022 327  140 - 400 Thousand/uL Final    MPV 08/16/2022 8.9  7.5 - 12.5 fL Final    Neutrophils, Abs 08/16/2022 4,563  1,500 - 7,800 cells/uL Final    Lymph # 08/16/2022 2,457  850 - 3,900 cells/uL Final    Mono # 08/16/2022 538  200 - 950 cells/uL Final    Eos # 08/16/2022 203  15 - 500 cells/uL Final    Baso # 08/16/2022 39  0 - 200 cells/uL Final    Neutrophils Relative 08/16/2022 58.5  % Final    Lymph % 08/16/2022 31.5  % Final    Mono % 08/16/2022 6.9  % Final    Eosinophil % 08/16/2022 2.6  % Final    Basophil % 08/16/2022 0.5  % Final    Glucose 08/16/2022 89  65 - 99 mg/dL Final    BUN 08/16/2022 17  7 - 25 mg/dL Final    Creatinine 08/16/2022 0.99  0.50 - 1.05 mg/dL Final    eGFR 08/16/2022 63  > OR = 60 mL/min/1.73m2 Final    BUN/Creatinine Ratio 08/16/2022 NOT APPLICABLE  6  - 22 (calc) Final    Sodium 08/16/2022 140  135 - 146 mmol/L Final    Potassium 08/16/2022 4.1  3.5 - 5.3 mmol/L Final    Chloride 08/16/2022 104  98 - 110 mmol/L Final    CO2 08/16/2022 28  20 - 32 mmol/L Final    Calcium 08/16/2022 9.2  8.6 - 10.4 mg/dL Final   Clinical Support on 06/08/2022   Component Date Value Ref Range Status    85% Max Predicted HR 06/08/2022 126   Final    Max Predicted HR 06/08/2022 148   Final    OHS CV CPX PATIENT IS MALE 06/08/2022 0.0   Final    [facility] CV CPX PATIENT IS FEMALE 06/08/2022 1.0   Final    Exercise duration (min) 06/08/2022 3  minutes Final    HR at rest 06/08/2022 78  bpm Final    Systolic blood pressure 06/08/2022 149  mmHg Final    Diastolic blood pressure 06/08/2022 86  mmHg Final    RPP 06/08/2022 11,622   Final    Peak HR 06/08/2022 164  bpm Final    Peak Systolic BP 06/08/2022 173  mmHg Final    Peak Diatolic BP 06/08/2022 68  mmHg Final    Peak RPP 06/08/2022 28,372   Final    Estimated METs 06/08/2022 5   Final    % Max HR Achieved 06/08/2022 111   Final    1 Minute Recovery HR 06/08/2022 124  bpm Final    ST Depression (mm) 06/08/2022 0.6  mm Final   Office Visit on 05/09/2022   Component Date Value Ref Range Status    WBC 05/09/2022 5.4  3.8 - 10.8 Thousand/uL Final    RBC 05/09/2022 4.45  3.80 - 5.10 Million/uL Final    Hemoglobin 05/09/2022 14.0  11.7 - 15.5 g/dL Final    Hematocrit 05/09/2022 40.9  35.0 - 45.0 % Final    MCV 05/09/2022 91.9  80.0 - 100.0 fL Final    MCH 05/09/2022 31.5  27.0 - 33.0 pg Final    MCHC 05/09/2022 34.2  32.0 - 36.0 g/dL Final    RDW 05/09/2022 12.5  11.0 - 15.0 % Final    Platelets 05/09/2022 305  140 - 400 Thousand/uL Final    MPV 05/09/2022 8.7  7.5 - 12.5 fL Final    Neutrophils, Abs 05/09/2022 2,813  1,500 - 7,800 cells/uL Final    Lymph # 05/09/2022 1,993  850 - 3,900 cells/uL Final    Mono # 05/09/2022 432  200 - 950 cells/uL Final    Eos # 05/09/2022 130  15 - 500 cells/uL Final    Baso # 05/09/2022 32  0 - 200 cells/uL Final     Neutrophils Relative 05/09/2022 52.1  % Final    Lymph % 05/09/2022 36.9  % Final    Mono % 05/09/2022 8.0  % Final    Eosinophil % 05/09/2022 2.4  % Final    Basophil % 05/09/2022 0.6  % Final    Glucose 05/09/2022 92  65 - 99 mg/dL Final    BUN 05/09/2022 16  7 - 25 mg/dL Final    Creatinine 05/09/2022 0.91  0.50 - 0.99 mg/dL Final    eGFR if non  05/09/2022 66  > OR = 60 mL/min/1.73m2 Final    eGFR if African American 05/09/2022 76  > OR = 60 mL/min/1.73m2 Final    BUN/Creatinine Ratio 05/09/2022 NOT APPLICABLE  6 - 22 (calc) Final    Sodium 05/09/2022 139  135 - 146 mmol/L Final    Potassium 05/09/2022 4.7  3.5 - 5.3 mmol/L Final    Chloride 05/09/2022 101  98 - 110 mmol/L Final    CO2 05/09/2022 27  20 - 32 mmol/L Final    Calcium 05/09/2022 9.9  8.6 - 10.4 mg/dL Final    Total Protein 05/09/2022 6.9  6.1 - 8.1 g/dL Final    Albumin 05/09/2022 4.6  3.6 - 5.1 g/dL Final    Globulin, Total 05/09/2022 2.3  1.9 - 3.7 g/dL (calc) Final    Albumin/Globulin Ratio 05/09/2022 2.0  1.0 - 2.5 (calc) Final    Total Bilirubin 05/09/2022 0.5  0.2 - 1.2 mg/dL Final    Alkaline Phosphatase 05/09/2022 75  37 - 153 U/L Final    AST 05/09/2022 15  10 - 35 U/L Final    ALT 05/09/2022 10  6 - 29 U/L Final    Cholesterol 05/09/2022 219 (H)  <200 mg/dL Final    HDL 05/09/2022 78  > OR = 50 mg/dL Final    Triglycerides 05/09/2022 88  <150 mg/dL Final    LDL Cholesterol 05/09/2022 122 (H)  mg/dL (calc) Final    HDL/Cholesterol Ratio 05/09/2022 2.8  <5.0 (calc) Final    Non HDL Chol. (LDL+VLDL) 05/09/2022 141 (H)  <130 mg/dL (calc) Final    TSH w/reflex to FT4 05/09/2022 1.16  0.40 - 4.50 mIU/L Final       Past Medical History:   Diagnosis Date    Allergy     Migraine      Past Surgical History:   Procedure Laterality Date    facial biopsy Right 08/15/2021    right forehead biopsy result basal cell carcinoma    TONSILLECTOMY      TUBAL LIGATION       Family History   Problem Relation Age of Onset    Cancer Mother 42  "       breast cancer    Heart disease Father         quadrouple bypass    Heart disease Maternal Grandmother     COPD Maternal Grandfather     Heart disease Paternal Grandmother        Marital Status:   Alcohol History:  reports current alcohol use.  Tobacco History:  reports that she is a non-smoker but has been exposed to tobacco smoke. She has never used smokeless tobacco.  Drug History:  reports no history of drug use.    Review of patient's allergies indicates:   Allergen Reactions    Pcn [penicillins] Hives and Nausea Only    Hydrocodone Nausea Only       Current Outpatient Medications:     gabapentin (NEURONTIN) 100 MG capsule, Take 2 capsules (200 mg total) by mouth every evening., Disp: 60 capsule, Rfl: 2    Review of Systems   Constitutional:  Positive for fatigue. Negative for appetite change, chills, fever and unexpected weight change.   HENT:  Negative for congestion.    Respiratory:  Negative for cough, chest tightness and shortness of breath.    Cardiovascular:  Negative for chest pain and palpitations.   Gastrointestinal:  Negative for abdominal distention and abdominal pain.   Endocrine: Negative for cold intolerance and heat intolerance.   Genitourinary:  Negative for difficulty urinating and dysuria.   Musculoskeletal:  Positive for arthralgias, neck pain and neck stiffness. Negative for back pain.   Neurological:  Positive for headaches (morning). Negative for dizziness and weakness.        Objective:      Vitals:    10/11/22 0938   BP: 118/82   Pulse: 80   SpO2: 99%   Weight: 68 kg (150 lb)   Height: 5' 5" (1.651 m)     Physical Exam  Constitutional:       General: She is not in acute distress.     Appearance: She is well-developed.   HENT:      Head: Normocephalic and atraumatic.   Eyes:      Conjunctiva/sclera: Conjunctivae normal.      Pupils: Pupils are equal, round, and reactive to light.   Neck:      Thyroid: No thyromegaly.   Cardiovascular:      Rate and Rhythm: Normal rate and " regular rhythm.      Heart sounds: Normal heart sounds.   Pulmonary:      Effort: Pulmonary effort is normal.      Breath sounds: Normal breath sounds.   Abdominal:      General: Bowel sounds are normal. There is no distension.      Palpations: Abdomen is soft.      Tenderness: There is no abdominal tenderness.   Musculoskeletal:         General: Normal range of motion.      Cervical back: Normal range of motion and neck supple.   Skin:     General: Skin is warm and dry.      Findings: No erythema.   Neurological:      Mental Status: She is alert and oriented to person, place, and time.      Cranial Nerves: No cranial nerve deficit.         Assessment:       1. Daytime somnolence    2. Strain of neck muscle, initial encounter         Plan:       Daytime somnolence  Comments:  coupled with morning headaches and heavy snoring, would like full evaluation with pulmonary and sleep study to be obtained.  Orders:  -     Ambulatory referral/consult to Pulmonology; Future; Expected date: 10/11/2022    Strain of neck muscle, initial encounter  Comments:  check xrays now. Will consider PT, dedicated depending on how xrays look.  Orders:  -     X-Ray Cervical Spine Complete 5 view; Future; Expected date: 10/11/2022    Follow up in about 8 weeks (around 12/6/2022) for AWV.        10/11/2022 Jorge Villeda PA-C

## 2022-10-12 ENCOUNTER — PATIENT MESSAGE (OUTPATIENT)
Dept: FAMILY MEDICINE | Facility: CLINIC | Age: 67
End: 2022-10-12

## 2022-10-13 DIAGNOSIS — S16.1XXA STRAIN OF NECK MUSCLE, INITIAL ENCOUNTER: Primary | ICD-10-CM

## 2022-10-21 ENCOUNTER — PATIENT MESSAGE (OUTPATIENT)
Dept: FAMILY MEDICINE | Facility: CLINIC | Age: 67
End: 2022-10-21

## 2022-10-24 ENCOUNTER — TELEPHONE (OUTPATIENT)
Dept: FAMILY MEDICINE | Facility: CLINIC | Age: 67
End: 2022-10-24

## 2022-10-24 NOTE — TELEPHONE ENCOUNTER
Spoke to pt. HRA appt was scheduled incorrectly. Will send message to Pilar asencio to get pt scheduled for HRA.

## 2022-10-24 NOTE — TELEPHONE ENCOUNTER
----- Message from Debbie Palomares sent at 10/24/2022  8:00 AM CDT -----  VM 10/24/22 @ 7:32 AM :patient calling to see what this appointment is for please give her a call back at [phone] because she may want to cancel it

## 2022-11-04 ENCOUNTER — PES CALL (OUTPATIENT)
Dept: ADMINISTRATIVE | Facility: CLINIC | Age: 67
End: 2022-11-04
Payer: MEDICARE

## 2022-11-21 ENCOUNTER — PATIENT MESSAGE (OUTPATIENT)
Dept: FAMILY MEDICINE | Facility: CLINIC | Age: 67
End: 2022-11-21

## 2022-11-27 ENCOUNTER — PATIENT MESSAGE (OUTPATIENT)
Dept: FAMILY MEDICINE | Facility: CLINIC | Age: 67
End: 2022-11-27

## 2022-11-29 ENCOUNTER — OFFICE VISIT (OUTPATIENT)
Dept: FAMILY MEDICINE | Facility: CLINIC | Age: 67
End: 2022-11-29
Payer: MEDICARE

## 2022-11-29 VITALS
HEART RATE: 93 BPM | WEIGHT: 148 LBS | TEMPERATURE: 99 F | SYSTOLIC BLOOD PRESSURE: 122 MMHG | HEIGHT: 65 IN | DIASTOLIC BLOOD PRESSURE: 88 MMHG | BODY MASS INDEX: 24.66 KG/M2

## 2022-11-29 DIAGNOSIS — U07.1 COVID-19: Primary | ICD-10-CM

## 2022-11-29 DIAGNOSIS — R50.9 FEVER, UNSPECIFIED FEVER CAUSE: ICD-10-CM

## 2022-11-29 LAB
CTP QC/QA: YES
CTP QC/QA: YES
POC MOLECULAR INFLUENZA A AGN: NEGATIVE
POC MOLECULAR INFLUENZA B AGN: NEGATIVE
SARS-COV-2 RDRP RESP QL NAA+PROBE: POSITIVE

## 2022-11-29 PROCEDURE — 3008F BODY MASS INDEX DOCD: CPT | Mod: CPTII,S$GLB,, | Performed by: FAMILY MEDICINE

## 2022-11-29 PROCEDURE — 3079F DIAST BP 80-89 MM HG: CPT | Mod: CPTII,S$GLB,, | Performed by: FAMILY MEDICINE

## 2022-11-29 PROCEDURE — 87635 SARS-COV-2 COVID-19 AMP PRB: CPT | Mod: QW,S$GLB,, | Performed by: FAMILY MEDICINE

## 2022-11-29 PROCEDURE — 3074F SYST BP LT 130 MM HG: CPT | Mod: CPTII,S$GLB,, | Performed by: FAMILY MEDICINE

## 2022-11-29 PROCEDURE — 87502 INFLUENZA DNA AMP PROBE: CPT | Mod: QW,,, | Performed by: FAMILY MEDICINE

## 2022-11-29 PROCEDURE — 3008F PR BODY MASS INDEX (BMI) DOCUMENTED: ICD-10-PCS | Mod: CPTII,S$GLB,, | Performed by: FAMILY MEDICINE

## 2022-11-29 PROCEDURE — 99213 PR OFFICE/OUTPT VISIT, EST, LEVL III, 20-29 MIN: ICD-10-PCS | Mod: S$GLB,,, | Performed by: FAMILY MEDICINE

## 2022-11-29 PROCEDURE — 3074F PR MOST RECENT SYSTOLIC BLOOD PRESSURE < 130 MM HG: ICD-10-PCS | Mod: CPTII,S$GLB,, | Performed by: FAMILY MEDICINE

## 2022-11-29 PROCEDURE — 99213 OFFICE O/P EST LOW 20 MIN: CPT | Mod: S$GLB,,, | Performed by: FAMILY MEDICINE

## 2022-11-29 PROCEDURE — 87502 POCT INFLUENZA A/B MOLECULAR: ICD-10-PCS | Mod: QW,,, | Performed by: FAMILY MEDICINE

## 2022-11-29 PROCEDURE — 3079F PR MOST RECENT DIASTOLIC BLOOD PRESSURE 80-89 MM HG: ICD-10-PCS | Mod: CPTII,S$GLB,, | Performed by: FAMILY MEDICINE

## 2022-11-29 PROCEDURE — 87635: ICD-10-PCS | Mod: QW,S$GLB,, | Performed by: FAMILY MEDICINE

## 2022-11-29 NOTE — PROGRESS NOTES
SUBJECTIVE:    Patient ID: Elle Hernandez is a 67 y.o. female.    Chief Complaint: Diarrhea (Nasal congestion, headache, sore throat, fever, sick for 3 days, Covid and Flu ordered, abc )    67-year-old female was at the [facility] this weekend.  She was among a crowd of people.  Two days ago she came down with fever cough sore throat and heavy chest.  Also feels some fatigue and some diarrhea.  Has been using TheraFlu over-the-counter at home with some good success of control of her symptoms.  No nausea vomiting.      Office Visit on 11/29/2022   Component Date Value Ref Range Status    POC Rapid COVID 11/29/2022 Positive (A)  Negative Final     Acceptable 11/29/2022 Yes   Final    POC Molecular Influenza A Ag 11/29/2022 Negative  Negative, Not Reported Final    POC Molecular Influenza B Ag 11/29/2022 Negative  Negative, Not Reported Final     Acceptable 11/29/2022 Yes   Final   Office Visit on 08/12/2022   Component Date Value Ref Range Status    WBC 08/16/2022 7.8  3.8 - 10.8 Thousand/uL Final    RBC 08/16/2022 4.30  3.80 - 5.10 Million/uL Final    Hemoglobin 08/16/2022 13.0  11.7 - 15.5 g/dL Final    Hematocrit 08/16/2022 39.4  35.0 - 45.0 % Final    MCV 08/16/2022 91.6  80.0 - 100.0 fL Final    MCH 08/16/2022 30.2  27.0 - 33.0 pg Final    MCHC 08/16/2022 33.0  32.0 - 36.0 g/dL Final    RDW 08/16/2022 12.5  11.0 - 15.0 % Final    Platelets 08/16/2022 327  140 - 400 Thousand/uL Final    MPV 08/16/2022 8.9  7.5 - 12.5 fL Final    Neutrophils, Abs 08/16/2022 4,563  1,500 - 7,800 cells/uL Final    Lymph # 08/16/2022 2,457  850 - 3,900 cells/uL Final    Mono # 08/16/2022 538  200 - 950 cells/uL Final    Eos # 08/16/2022 203  15 - 500 cells/uL Final    Baso # 08/16/2022 39  0 - 200 cells/uL Final    Neutrophils Relative 08/16/2022 58.5  % Final    Lymph % 08/16/2022 31.5  % Final    Mono % 08/16/2022 6.9  % Final    Eosinophil % 08/16/2022 2.6  % Final    Basophil % 08/16/2022 0.5  %  Final    Glucose 08/16/2022 89  65 - 99 mg/dL Final    BUN 08/16/2022 17  7 - 25 mg/dL Final    Creatinine 08/16/2022 0.99  0.50 - 1.05 mg/dL Final    eGFR 08/16/2022 63  > OR = 60 mL/min/1.73m2 Final    BUN/Creatinine Ratio 08/16/2022 NOT APPLICABLE  6 - 22 (calc) Final    Sodium 08/16/2022 140  135 - 146 mmol/L Final    Potassium 08/16/2022 4.1  3.5 - 5.3 mmol/L Final    Chloride 08/16/2022 104  98 - 110 mmol/L Final    CO2 08/16/2022 28  20 - 32 mmol/L Final    Calcium 08/16/2022 9.2  8.6 - 10.4 mg/dL Final   Clinical Support on 06/08/2022   Component Date Value Ref Range Status    85% Max Predicted HR 06/08/2022 126   Final    Max Predicted HR 06/08/2022 148   Final    [facility] CV CPX PATIENT IS MALE 06/08/2022 0.0   Final    [facility] CV CPX PATIENT IS FEMALE 06/08/2022 1.0   Final    Exercise duration (min) 06/08/2022 3  minutes Final    HR at rest 06/08/2022 78  bpm Final    Systolic blood pressure 06/08/2022 149  mmHg Final    Diastolic blood pressure 06/08/2022 86  mmHg Final    RPP 06/08/2022 11,622   Final    Peak HR 06/08/2022 164  bpm Final    Peak Systolic BP 06/08/2022 173  mmHg Final    Peak Diatolic BP 06/08/2022 68  mmHg Final    Peak RPP 06/08/2022 28,372   Final    Estimated METs 06/08/2022 5   Final    % Max HR Achieved 06/08/2022 111   Final    1 Minute Recovery HR 06/08/2022 124  bpm Final    ST Depression (mm) 06/08/2022 0.6  mm Final       Past Medical History:   Diagnosis Date    Allergy     Migraine      Social History     Socioeconomic History    Marital status:    Tobacco Use    Smoking status: Passive Smoke Exposure - Never Smoker    Smokeless tobacco: Never   Substance and Sexual Activity    Alcohol use: Yes     Comment: 2 glasses of red wine daily    Drug use: No     Past Surgical History:   Procedure Laterality Date    facial biopsy Right 08/15/2021    right forehead biopsy result basal cell carcinoma    TONSILLECTOMY      TUBAL LIGATION       Family History   Problem Relation Age of  "Onset    Cancer Mother 42        breast cancer    Heart disease Father         quadrouple bypass    Heart disease Maternal Grandmother     COPD Maternal Grandfather     Heart disease Paternal Grandmother        Review of patient's allergies indicates:   Allergen Reactions    Pcn [penicillins] Hives and Nausea Only    Hydrocodone Nausea Only       Current Outpatient Medications:     gabapentin (NEURONTIN) 100 MG capsule, Take 2 capsules (200 mg total) by mouth every evening., Disp: 60 capsule, Rfl: 2    Review of Systems   Constitutional:  Positive for fatigue. Negative for appetite change, chills, fever and unexpected weight change.   HENT:  Positive for congestion. Negative for ear pain, sinus pain, sore throat and trouble swallowing.    Eyes:  Negative for pain, discharge and visual disturbance.   Respiratory:  Positive for cough and chest tightness. Negative for apnea, shortness of breath and wheezing.    Cardiovascular:  Negative for chest pain, palpitations and leg swelling.   Gastrointestinal:  Positive for diarrhea. Negative for abdominal pain, blood in stool, constipation, nausea and vomiting.   Endocrine: Negative for heat intolerance, polydipsia and polyuria.   Genitourinary:  Negative for difficulty urinating, dyspareunia, dysuria, frequency, hematuria and menstrual problem.   Musculoskeletal:  Negative for arthralgias, back pain, gait problem, joint swelling and myalgias.   Allergic/Immunologic: Negative for environmental allergies, food allergies and immunocompromised state.   Neurological:  Negative for dizziness, tremors, seizures, numbness and headaches.   Psychiatric/Behavioral:  Negative for behavioral problems, confusion, hallucinations and suicidal ideas. The patient is not nervous/anxious.         Objective:      Vitals:    11/29/22 0908   BP: 122/88   Pulse: 93   Temp: 99 °F (37.2 °C)   Weight: 67.1 kg (148 lb)   Height: 5' 5" (1.651 m)     Physical Exam  Vitals and nursing note reviewed. "   Constitutional:       General: She is not in acute distress.     Appearance: She is well-developed. She is not toxic-appearing.   HENT:      Head: Normocephalic and atraumatic.      Right Ear: Tympanic membrane and external ear normal.      Left Ear: Tympanic membrane and external ear normal.      Nose: Nose normal.      Mouth/Throat:      Pharynx: Posterior oropharyngeal erythema present. No oropharyngeal exudate.   Eyes:      Pupils: Pupils are equal, round, and reactive to light.   Neck:      Thyroid: No thyromegaly.      Vascular: No carotid bruit.   Cardiovascular:      Rate and Rhythm: Normal rate and regular rhythm.      Heart sounds: Normal heart sounds. No murmur heard.  Pulmonary:      Effort: Pulmonary effort is normal.      Breath sounds: Normal breath sounds. No wheezing or rales.   Abdominal:      General: Bowel sounds are normal. There is no distension.      Palpations: Abdomen is soft.      Tenderness: There is no abdominal tenderness.   Musculoskeletal:         General: No tenderness or deformity. Normal range of motion.      Cervical back: Normal range of motion and neck supple.      Lumbar back: Normal. No spasms.      Comments: Bends 90 degrees at  waist shoulders good range of motion, knees are crepitant.   Lymphadenopathy:      Cervical: No cervical adenopathy.   Skin:     General: Skin is warm and dry.      Findings: No rash.   Neurological:      Mental Status: She is alert and oriented to person, place, and time.      Cranial Nerves: No cranial nerve deficit.      Coordination: Coordination normal.   Psychiatric:         Mood and Affect: Mood normal.         Behavior: Behavior normal.         Thought Content: Thought content normal.         Judgment: Judgment normal.         Assessment:       1. COVID-19    2. Fever, unspecified fever cause           Plan:       COVID-19  Patient's COVID test was positive.  She is healthy otherwise, will treat symptoms only at this time.  Tussionex or  Robitussin DM for cough and congestion.  Tylenol or ibuprofen for body aches and fever.  Thera flu has been effective as well.  Recommend patient quarantine at home for 5 days, wear a mask in public 5 days after that.  Fever, unspecified fever cause  -     POCT COVID-19 Rapid Screening  -     POCT Influenza A/B Molecular  Influenza test was negative  Follow up if symptoms worsen or fail to improve.        11/29/2022 Ezequiel Hernandez

## 2022-12-02 ENCOUNTER — PATIENT MESSAGE (OUTPATIENT)
Dept: FAMILY MEDICINE | Facility: CLINIC | Age: 67
End: 2022-12-02

## 2022-12-05 ENCOUNTER — TELEPHONE (OUTPATIENT)
Dept: FAMILY MEDICINE | Facility: CLINIC | Age: 67
End: 2022-12-05

## 2022-12-05 NOTE — TELEPHONE ENCOUNTER
----- Message from Sheila Castrejon sent at 12/5/2022 11:20 AM CST -----  Pt wants to know if she needs to come in to re-test for Covid. Pt #[phone]

## 2022-12-05 NOTE — TELEPHONE ENCOUNTER
Spoke with patient and let her know that we do not recommend restesting as you can continue to test positive for up to 90 days after the initial test. I advised her of the cdc guidelines.

## 2022-12-14 ENCOUNTER — PATIENT MESSAGE (OUTPATIENT)
Dept: FAMILY MEDICINE | Facility: CLINIC | Age: 67
End: 2022-12-14

## 2022-12-14 DIAGNOSIS — J02.9 PHARYNGITIS, UNSPECIFIED ETIOLOGY: Primary | ICD-10-CM

## 2022-12-14 RX ORDER — DOXYCYCLINE 100 MG/1
100 CAPSULE ORAL 2 TIMES DAILY
Qty: 20 CAPSULE | Refills: 0 | Status: SHIPPED | OUTPATIENT
Start: 2022-12-14 | End: 2023-04-10

## 2023-01-14 ENCOUNTER — PATIENT MESSAGE (OUTPATIENT)
Dept: FAMILY MEDICINE | Facility: CLINIC | Age: 68
End: 2023-01-14

## 2023-01-19 ENCOUNTER — PATIENT MESSAGE (OUTPATIENT)
Dept: FAMILY MEDICINE | Facility: CLINIC | Age: 68
End: 2023-01-19

## 2023-03-12 ENCOUNTER — PATIENT MESSAGE (OUTPATIENT)
Dept: FAMILY MEDICINE | Facility: CLINIC | Age: 68
End: 2023-03-12

## 2023-04-09 ENCOUNTER — PATIENT MESSAGE (OUTPATIENT)
Dept: FAMILY MEDICINE | Facility: CLINIC | Age: 68
End: 2023-04-09

## 2023-04-10 ENCOUNTER — OFFICE VISIT (OUTPATIENT)
Dept: FAMILY MEDICINE | Facility: CLINIC | Age: 68
End: 2023-04-10
Payer: MEDICARE

## 2023-04-10 VITALS
SYSTOLIC BLOOD PRESSURE: 126 MMHG | WEIGHT: 148 LBS | HEART RATE: 68 BPM | DIASTOLIC BLOOD PRESSURE: 82 MMHG | BODY MASS INDEX: 24.66 KG/M2 | HEIGHT: 65 IN

## 2023-04-10 DIAGNOSIS — L50.9 HIVES: Primary | ICD-10-CM

## 2023-04-10 DIAGNOSIS — K21.9 GASTROESOPHAGEAL REFLUX DISEASE, UNSPECIFIED WHETHER ESOPHAGITIS PRESENT: ICD-10-CM

## 2023-04-10 PROCEDURE — 3008F PR BODY MASS INDEX (BMI) DOCUMENTED: ICD-10-PCS | Mod: CPTII,S$GLB,, | Performed by: PHYSICIAN ASSISTANT

## 2023-04-10 PROCEDURE — 3074F PR MOST RECENT SYSTOLIC BLOOD PRESSURE < 130 MM HG: ICD-10-PCS | Mod: CPTII,S$GLB,, | Performed by: PHYSICIAN ASSISTANT

## 2023-04-10 PROCEDURE — 3288F PR FALLS RISK ASSESSMENT DOCUMENTED: ICD-10-PCS | Mod: CPTII,S$GLB,, | Performed by: PHYSICIAN ASSISTANT

## 2023-04-10 PROCEDURE — 3079F PR MOST RECENT DIASTOLIC BLOOD PRESSURE 80-89 MM HG: ICD-10-PCS | Mod: CPTII,S$GLB,, | Performed by: PHYSICIAN ASSISTANT

## 2023-04-10 PROCEDURE — 96372 PR INJECTION,THERAP/PROPH/DIAG2ST, IM OR SUBCUT: ICD-10-PCS | Mod: S$GLB,,, | Performed by: PHYSICIAN ASSISTANT

## 2023-04-10 PROCEDURE — 3288F FALL RISK ASSESSMENT DOCD: CPT | Mod: CPTII,S$GLB,, | Performed by: PHYSICIAN ASSISTANT

## 2023-04-10 PROCEDURE — 96372 THER/PROPH/DIAG INJ SC/IM: CPT | Mod: S$GLB,,, | Performed by: PHYSICIAN ASSISTANT

## 2023-04-10 PROCEDURE — 1159F MED LIST DOCD IN RCRD: CPT | Mod: CPTII,S$GLB,, | Performed by: PHYSICIAN ASSISTANT

## 2023-04-10 PROCEDURE — 99214 PR OFFICE/OUTPT VISIT, EST, LEVL IV, 30-39 MIN: ICD-10-PCS | Mod: 25,S$GLB,, | Performed by: PHYSICIAN ASSISTANT

## 2023-04-10 PROCEDURE — 1125F PR PAIN SEVERITY QUANTIFIED, PAIN PRESENT: ICD-10-PCS | Mod: CPTII,S$GLB,, | Performed by: PHYSICIAN ASSISTANT

## 2023-04-10 PROCEDURE — 99214 OFFICE O/P EST MOD 30 MIN: CPT | Mod: 25,S$GLB,, | Performed by: PHYSICIAN ASSISTANT

## 2023-04-10 PROCEDURE — 3079F DIAST BP 80-89 MM HG: CPT | Mod: CPTII,S$GLB,, | Performed by: PHYSICIAN ASSISTANT

## 2023-04-10 PROCEDURE — 1101F PT FALLS ASSESS-DOCD LE1/YR: CPT | Mod: CPTII,S$GLB,, | Performed by: PHYSICIAN ASSISTANT

## 2023-04-10 PROCEDURE — 1101F PR PT FALLS ASSESS DOC 0-1 FALLS W/OUT INJ PAST YR: ICD-10-PCS | Mod: CPTII,S$GLB,, | Performed by: PHYSICIAN ASSISTANT

## 2023-04-10 PROCEDURE — 3074F SYST BP LT 130 MM HG: CPT | Mod: CPTII,S$GLB,, | Performed by: PHYSICIAN ASSISTANT

## 2023-04-10 PROCEDURE — 1159F PR MEDICATION LIST DOCUMENTED IN MEDICAL RECORD: ICD-10-PCS | Mod: CPTII,S$GLB,, | Performed by: PHYSICIAN ASSISTANT

## 2023-04-10 PROCEDURE — 3008F BODY MASS INDEX DOCD: CPT | Mod: CPTII,S$GLB,, | Performed by: PHYSICIAN ASSISTANT

## 2023-04-10 PROCEDURE — 1125F AMNT PAIN NOTED PAIN PRSNT: CPT | Mod: CPTII,S$GLB,, | Performed by: PHYSICIAN ASSISTANT

## 2023-04-10 RX ORDER — DEXAMETHASONE SODIUM PHOSPHATE 4 MG/ML
8 INJECTION, SOLUTION INTRA-ARTICULAR; INTRALESIONAL; INTRAMUSCULAR; INTRAVENOUS; SOFT TISSUE ONCE
Status: COMPLETED | OUTPATIENT
Start: 2023-04-10 | End: 2023-04-10

## 2023-04-10 RX ADMIN — DEXAMETHASONE SODIUM PHOSPHATE 8 MG: 4 INJECTION, SOLUTION INTRA-ARTICULAR; INTRALESIONAL; INTRAMUSCULAR; INTRAVENOUS; SOFT TISSUE at 04:04

## 2023-04-10 NOTE — PROGRESS NOTES
SUBJECTIVE:    Patient ID: Elle Hernandez is a 67 y.o. female.    Chief Complaint: Pain (Stomach pain started with being constipated, has been taking miralax and has given her some relief, pt has rash from head to stomach and groin area, she did take some amoxicillin that her  had and was wondering if maybe that what caused it, no bottles// SW)    This is a 67-year-old female who presents today for evaluation of rash.  Reports that she started with stomach pain and constipation.  Decided to take MiraLax that has given her some relief.  She opted to take a prescription for amoxicillin that her  had and then noticed a few days later she has developed a rash from head to stomach/groin.  I do note that she has a allergy reported in her chart to penicillin.  Previously caused hives.  Patient reports that she has seen Dr. Medellin for the constipation. EGD set up for May 12. GERD sxs had become more severe. She is waiting for further evaluation.      Office Visit on 11/29/2022   Component Date Value Ref Range Status    POC Rapid COVID 11/29/2022 Positive (A)  Negative Final     Acceptable 11/29/2022 Yes   Final    POC Molecular Influenza A Ag 11/29/2022 Negative  Negative, Not Reported Final    POC Molecular Influenza B Ag 11/29/2022 Negative  Negative, Not Reported Final     Acceptable 11/29/2022 Yes   Final       Past Medical History:   Diagnosis Date    Allergy     Migraine      Past Surgical History:   Procedure Laterality Date    facial biopsy Right 08/15/2021    right forehead biopsy result basal cell carcinoma    TONSILLECTOMY      TUBAL LIGATION       Family History   Problem Relation Age of Onset    Cancer Mother 42        breast cancer    Heart disease Father         quadrouple bypass    Heart disease Maternal Grandmother     COPD Maternal Grandfather     Heart disease Paternal Grandmother        Marital Status:   Alcohol History:  reports current alcohol  "use.  Tobacco History:  reports that she has never smoked. She has been exposed to tobacco smoke. She has never used smokeless tobacco.  Drug History:  reports no history of drug use.    Review of patient's allergies indicates:   Allergen Reactions    Pcn [penicillins] Hives and Nausea Only    Hydrocodone Nausea Only       Current Outpatient Medications:     gabapentin (NEURONTIN) 100 MG capsule, Take 2 capsules (200 mg total) by mouth every evening., Disp: 60 capsule, Rfl: 2    Current Facility-Administered Medications:     dexAMETHasone injection 8 mg, 8 mg, Intramuscular, Once, Jorge Villeda PA-C    Review of Systems   Constitutional:  Negative for appetite change, chills, fatigue, fever and unexpected weight change.   HENT:  Negative for congestion.    Respiratory:  Negative for cough, chest tightness and shortness of breath.    Cardiovascular:  Negative for chest pain and palpitations.   Gastrointestinal:  Positive for constipation. Negative for abdominal distention and abdominal pain.        GERD   Endocrine: Negative for cold intolerance and heat intolerance.   Genitourinary:  Negative for difficulty urinating and dysuria.   Musculoskeletal:  Negative for arthralgias and back pain.   Skin:  Positive for rash (hive eruption).   Neurological:  Negative for dizziness, weakness and headaches.        Objective:      Vitals:    04/10/23 1542   BP: 126/82   Pulse: 68   Weight: 67.1 kg (148 lb)   Height: 5' 5" (1.651 m)     Physical Exam  Constitutional:       General: She is not in acute distress.     Appearance: She is well-developed.   HENT:      Head: Normocephalic and atraumatic.   Eyes:      Conjunctiva/sclera: Conjunctivae normal.      Pupils: Pupils are equal, round, and reactive to light.   Neck:      Thyroid: No thyromegaly.   Cardiovascular:      Rate and Rhythm: Normal rate and regular rhythm.      Heart sounds: Normal heart sounds.   Pulmonary:      Effort: Pulmonary effort is normal.      Breath " sounds: Normal breath sounds.   Abdominal:      General: Bowel sounds are normal. There is no distension.      Palpations: Abdomen is soft.      Tenderness: There is no abdominal tenderness.   Musculoskeletal:         General: Normal range of motion.      Cervical back: Normal range of motion and neck supple.   Skin:     General: Skin is warm and dry.      Findings: Rash (hive eruption back, stomach. excoriation present) present. No erythema.   Neurological:      Mental Status: She is alert and oriented to person, place, and time.      Cranial Nerves: No cranial nerve deficit.         Assessment:       1. Hives    2. Gastroesophageal reflux disease, unspecified whether esophagitis present         Plan:       Hives  Comments:  Suspect that she does have reaction to PCN. Noted previous allergy in the chart. Will d/c this now. seteroid given in clinic. await resolution.   Orders:  -     dexAMETHasone injection 8 mg    Gastroesophageal reflux disease, unspecified whether esophagitis present  Comments:  Doing better. Will be planning to have EGD in the next few weeks.      Follow up if symptoms worsen or fail to improve.        4/10/2023 Jorge Villeda PA-C

## 2023-04-10 NOTE — TELEPHONE ENCOUNTER
Spoke to pt. Scheduled for same day appt. States abd pain comes and goes. She is still having the pain, but not as bad as over the weekend. States right now she would like to get a rash looked at.

## 2023-05-09 ENCOUNTER — PATIENT MESSAGE (OUTPATIENT)
Dept: FAMILY MEDICINE | Facility: CLINIC | Age: 68
End: 2023-05-09

## 2023-05-09 ENCOUNTER — HOSPITAL ENCOUNTER (OUTPATIENT)
Dept: PREADMISSION TESTING | Facility: HOSPITAL | Age: 68
Discharge: HOME | End: 2023-05-09
Attending: INTERNAL MEDICINE
Payer: MEDICARE

## 2023-05-09 VITALS
BODY MASS INDEX: 23.9 KG/M2 | WEIGHT: 140 LBS | RESPIRATION RATE: 18 BRPM | HEART RATE: 73 BPM | TEMPERATURE: 98 F | DIASTOLIC BLOOD PRESSURE: 82 MMHG | HEIGHT: 64 IN | OXYGEN SATURATION: 99 % | SYSTOLIC BLOOD PRESSURE: 130 MMHG

## 2023-05-09 DIAGNOSIS — Z01.818 PRE-OP TESTING: Primary | ICD-10-CM

## 2023-05-09 PROCEDURE — 93010 EKG 12-LEAD: ICD-10-PCS | Mod: ,,, | Performed by: INTERNAL MEDICINE

## 2023-05-09 PROCEDURE — 93010 ELECTROCARDIOGRAM REPORT: CPT | Mod: ,,, | Performed by: INTERNAL MEDICINE

## 2023-05-09 PROCEDURE — 93005 ELECTROCARDIOGRAM TRACING: CPT | Performed by: INTERNAL MEDICINE

## 2023-05-09 NOTE — DISCHARGE INSTRUCTIONS
To confirm, Your procedure is scheduled for:  Friday, May 12, 2023    Endoscopy will call the afternoon prior with the final arrival time on Thursday, May 11, 2023    Please report to [facility] via [address] entrance. Check in at registration desk.    Do not eat or drink anything between midnight and the time of your procedure.      DO NOT TAKE THESE MEDICATIONS PRIOR to your procedure or per your surgeon's request: ASPIRIN, ALEVE, ADVIL, IBUPROFEN, FISH OIL VITAMIN E, HERBALS  (May take Tylenol)      INSTRUCTIONS IMPORTANT!!    Do not smoke, vape or drink alcoholic beverages 24 hours prior to your procedure.     If you wear contact lenses, dentures, hearing aids or glasses, bring a container to put them in during surgery and give to a family member for safe keeping.      Please leave all jewelry, piercing's and valuables at home.     Make arrangements in advance for transportation home by a responsible adult.    You must make arrangements for transportation, TAXI'S, UBER'S OR LYFTS ARE NOT ALLOWED.        If you have any questions about these instructions, call Pre-Op Admit  Nursing at [phone] or the Pre-Op Endoscopy [phone]

## 2023-05-09 NOTE — CARE UPDATE
Spoke to patient and discussed cardiology follow-up. Patient states previous follow uo delayed since her mother had a stroke; will call office and schedule.

## 2023-05-09 NOTE — CARE UPDATE
EKG and history reviewed per Dr Gonzalez to proceed. Patient to follow up with cardiologist as recommended per Jorge Villeda PA-C.

## 2023-05-10 ENCOUNTER — PATIENT MESSAGE (OUTPATIENT)
Dept: FAMILY MEDICINE | Facility: CLINIC | Age: 68
End: 2023-05-10

## 2023-05-10 DIAGNOSIS — R94.39 ABNORMAL STRESS TEST: Primary | ICD-10-CM

## 2023-05-10 DIAGNOSIS — R94.31 ABNORMAL ELECTROCARDIOGRAM (ECG) (EKG): ICD-10-CM

## 2023-05-12 ENCOUNTER — HOSPITAL ENCOUNTER (OUTPATIENT)
Facility: HOSPITAL | Age: 68
Discharge: HOME | End: 2023-05-12
Attending: INTERNAL MEDICINE | Admitting: INTERNAL MEDICINE
Payer: MEDICARE

## 2023-05-12 ENCOUNTER — ANESTHESIA EVENT (OUTPATIENT)
Dept: SURGERY | Facility: HOSPITAL | Age: 68
End: 2023-05-12
Payer: MEDICARE

## 2023-05-12 ENCOUNTER — ANESTHESIA (OUTPATIENT)
Dept: SURGERY | Facility: HOSPITAL | Age: 68
End: 2023-05-12
Payer: MEDICARE

## 2023-05-12 VITALS
SYSTOLIC BLOOD PRESSURE: 122 MMHG | OXYGEN SATURATION: 99 % | DIASTOLIC BLOOD PRESSURE: 64 MMHG | RESPIRATION RATE: 17 BRPM | WEIGHT: 150 LBS | HEIGHT: 64 IN | HEART RATE: 100 BPM | HEART RATE: 78 BPM | BODY MASS INDEX: 25.61 KG/M2 | OXYGEN SATURATION: 98 % | SYSTOLIC BLOOD PRESSURE: 125 MMHG | DIASTOLIC BLOOD PRESSURE: 79 MMHG | TEMPERATURE: 99 F

## 2023-05-12 DIAGNOSIS — R11.0 NAUSEA: ICD-10-CM

## 2023-05-12 PROCEDURE — 37000008 HC ANESTHESIA 1ST 15 MINUTES: Performed by: INTERNAL MEDICINE

## 2023-05-12 PROCEDURE — 43239 EGD BIOPSY SINGLE/MULTIPLE: CPT | Performed by: INTERNAL MEDICINE

## 2023-05-12 PROCEDURE — 27200043 HC FORCEPS, BIOPSY: Performed by: INTERNAL MEDICINE

## 2023-05-12 PROCEDURE — 63600175 PHARM REV CODE 636 W HCPCS: Performed by: NURSE ANESTHETIST, CERTIFIED REGISTERED

## 2023-05-12 PROCEDURE — 88305 TISSUE EXAM BY PATHOLOGIST: CPT | Mod: TC,59

## 2023-05-12 RX ORDER — PROPOFOL 10 MG/ML
VIAL (ML) INTRAVENOUS
Status: DISCONTINUED | OUTPATIENT
Start: 2023-05-12 | End: 2023-05-12

## 2023-05-12 RX ADMIN — SODIUM CHLORIDE, SODIUM LACTATE, POTASSIUM CHLORIDE, AND CALCIUM CHLORIDE: .6; .31; .03; .02 INJECTION, SOLUTION INTRAVENOUS at 09:05

## 2023-05-12 RX ADMIN — PROPOFOL 150 MG: 10 INJECTION, EMULSION INTRAVENOUS at 09:05

## 2023-05-12 NOTE — [employer] PATIENT INSTRUCTIONS
Discharge Summary/Instructions after an Endoscopic Procedure  Patient Name: Elle Hernandez  Patient MRN: [id number]  Patient YOB: 1955  Friday, May 12, 2023  Cruz Whitney MD  RESTRICTIONS:  During your procedure today, you received medications for sedation.  These   medications may affect your judgment, balance and coordination.  Therefore,   for 24 hours, you have the following restrictions:   - DO NOT drive a car, operate machinery, make legal/financial decisions,   sign important papers or drink alcohol.    ACTIVITY:  Today: no heavy lifting, straining or running due to procedural   sedation/anesthesia.  The following day: return to full activity including work.  DIET:  Eat and drink normally unless instructed otherwise.     TREATMENT FOR COMMON SIDE EFFECTS:  - Mild abdominal pain, nausea, belching, bloating or excessive gas:  rest,   eat lightly and use a heating pad.  - Sore Throat: treat with throat lozenges and/or gargle with warm salt   water.  - Because air was used during the procedure, expelling large amounts of air   from your rectum or belching is normal.  - If a bowel prep was taken, you may not have a bowel movement for 1-3 days.    This is normal.  SYMPTOMS TO WATCH FOR AND REPORT TO YOUR PHYSICIAN:  1. Abdominal pain or bloating, other than gas cramps.  2. Chest pain.  3. Back pain.  4. Signs of infection such as: chills or fever occurring within 24 hours   after the procedure.  5. Rectal bleeding, which would show as bright red, maroon, or black stools.   (A tablespoon of blood from the rectum is not serious, especially if   hemorrhoids are present.)  6. Vomiting.  7. Weakness or dizziness.  GO DIRECTLY TO THE NEAREST EMERGENCY ROOM IF YOU HAVE ANY OF THE FOLLOWING:      Difficulty breathing              Chills and/or fever over 101 F   Persistent vomiting and/or vomiting blood   Severe abdominal pain   Severe chest pain   Black, tarry stools   Bleeding- more than one  tablespoon   Any other symptom or condition that you feel may need urgent attention  Your doctor recommends these additional instructions:  If any biopsies were taken, your doctors clinic will contact you in 1 to 2   weeks with any results.  - Await pathology results.   - Discharge patient to home.   - Continue present medications.   - Await pathology results.  For questions, problems or results please call your physician - Cruz Whitney MD at Work:  [phone].  [facility], EMERGENCY ROOM PHONE NUMBER: [phone]  IF A COMPLICATION OR EMERGENCY SITUATION ARISES AND YOU ARE UNABLE TO REACH   YOUR PHYSICIAN - GO DIRECTLY TO THE EMERGENCY ROOM.  Cruz Whitney MD  5/12/2023 9:22:53 AM  This report has been verified and signed electronically.  Dear patient,  As a result of recent federal legislation (The Federal Cures Act), you may   receive lab or pathology results from your procedure in your [facility]   account before your physician is able to contact you. Your physician or   their representative will relay the results to you with their   recommendations at their soonest availability.  Thank you,  [employer]

## 2023-05-12 NOTE — H&P
GASTROENTEROLOGY PRE-PROCEDURE H&P NOTE  Patient Name: Elle Hernandez  Patient MRN: [id number]  Patient : 1955    Service date: 2023    PCP: Ezequiel Hernandez MD    No chief complaint on file.      HPI: Patient is a 68 y.o. female with PMHx as below here for evaluation of heartburn, epigastric pain, nausea    Past Medical History:  Past Medical History:   Diagnosis Date    Allergy     Migraine         Past Surgical History:  Past Surgical History:   Procedure Laterality Date    facial biopsy Right 08/15/2021    right forehead biopsy result basal cell carcinoma    TONSILLECTOMY      TUBAL LIGATION          Home Medications:  No medications prior to admission.               Review of patient's allergies indicates:   Allergen Reactions    Pcn [penicillins] Hives and Nausea Only    Hydrocodone Nausea Only       Social History:   Social History     Occupational History    Not on file   Tobacco Use    Smoking status: Never     Passive exposure: Yes    Smokeless tobacco: Never   Substance and Sexual Activity    Alcohol use: Yes     Comment: 2 glasses of red wine daily    Drug use: No    Sexual activity: Not on file       Family History:   Family History   Problem Relation Age of Onset    Cancer Mother 42        breast cancer    Heart disease Father         quadrouple bypass    Heart disease Maternal Grandmother     COPD Maternal Grandfather     Heart disease Paternal Grandmother        Review of Systems:  A 10 point review of systems was performed and was normal, except as mentioned in the HPI, including constitutional, HEENT, heme, lymph, cardiovascular, respiratory, gastrointestinal, genitourinary, neurologic, endocrine, psychiatric and musculoskeletal.      OBJECTIVE:    Physical Exam:  24 Hour Vital Sign Ranges: Temp:  [98.4 °F (36.9 °C)] 98.4 °F (36.9 °C)  Pulse:  [81] 81  Resp:  [16] 16  SpO2:  [100 %] 100 %  BP: (162)/(78) 162/78  Most recent vitals: BP (!) 162/78 (BP Location: Left arm, Patient  "Position: Lying)   Pulse 81   Temp 98.4 °F (36.9 °C) (Oral)   Resp 16   Ht 5' 4" (1.626 m)   Wt 68 kg (150 lb)   SpO2 100% Comment: room air  BMI 25.75 kg/m²    GEN: well-developed, well-nourished, awake and alert, non-toxic appearing adult  HEENT: PERRL, sclera anicteric, oral mucosa pink and moist without lesion  NECK: trachea midline; Good ROM  CV: regular rate and rhythm, no murmurs or gallops  RESP: clear to auscultation bilaterally, no wheezes, rhonci or rales  ABD: soft, non-tender, non-distended, normal bowel sounds  EXT: no swelling or edema, 2+ pulses distally  SKIN: no rashes or jaundice  PSYCH: normal affect    Labs:   Recent Labs     05/09/23  1320   WBC 7.01   MCV 92        No results for input(s): NA, K, CL, CO2, BUN, GLU in the last 72 hours.    Invalid input(s): CREA  No results for input(s): ALB in the last 72 hours.    Invalid input(s): ALKP, SGOT, SGPT, TBIL, DBIL, TPRO  No results for input(s): PT, INR, PTT in the last 72 hours.      IMPRESSION / RECOMMENDATIONS:  EGD with interventions as warranted.   RIsks, benefits, alternatives discussed in detail regarding upcoming procedures and sedation. Some of the more common endoscopic complications include but not limited to immediate or delayed perforation, bleeding, infections, pain, inadvertent injury to surrounding tissue / organs and possible need for surgical evaluation. Patient expressed understanding, all questions answered and will proceed with procedure as planned.     Cruz Whitney  5/12/2023  8:58 AM     "

## 2023-05-12 NOTE — ANESTHESIA PREPROCEDURE EVALUATION
05/12/2023  Elle Hernandez is a 68 y.o., female.      Patient Active Problem List   Diagnosis    Depression    Transient ischemic attack    Complicated migraine    Primary osteoarthritis    Primary insomnia    Right shoulder strain, subsequent encounter    Gastroesophageal reflux disease without esophagitis    Mixed hyperlipidemia    Fish hook injury of right forearm       Past Surgical History:   Procedure Laterality Date    facial biopsy Right 08/15/2021    right forehead biopsy result basal cell carcinoma    TONSILLECTOMY      TUBAL LIGATION          Tobacco Use:  The patient  reports that she has never smoked. She has been exposed to tobacco smoke. She has never used smokeless tobacco.     Results for orders placed or performed during the hospital encounter of 05/09/23   EKG 12-lead    Collection Time: 05/09/23 12:37 PM    Narrative    Test Reason : Z01.818,    Vent. Rate : 076 BPM     Atrial Rate : 076 BPM     P-R Int : 118 ms          QRS Dur : 066 ms      QT Int : 386 ms       P-R-T Axes : 068 080 097 degrees     QTc Int : 434 ms    Sinus rhythm with Premature atrial complexes  Septal infarct ,age undetermined  Abnormal ECG  When compared with ECG of 07-APR-2014 16:21,  Premature atrial complexes are now Present  Septal infarct is now Present  Nonspecific T wave abnormality now evident in Lateral leads    Referred By:             Confirmed By:              Lab Results   Component Value Date    WBC 7.01 05/09/2023    HGB 13.2 05/09/2023    HCT 39.9 05/09/2023    MCV 92 05/09/2023     05/09/2023     BMP  Lab Results   Component Value Date     08/16/2022    K 4.1 08/16/2022     08/16/2022    CO2 28 08/16/2022    BUN 17 08/16/2022    CREATININE 0.99 08/16/2022    CALCIUM 9.2 08/16/2022    ANIONGAP 9 04/13/2019    GLU 89 08/16/2022    GLU 92 05/09/2022     (H)  04/13/2021       No results found for this or any previous visit.            Pre-op Assessment    I have reviewed the Patient Summary Reports.     I have reviewed the Nursing Notes. I have reviewed the NPO Status.   I have reviewed the Medications.     Review of Systems  Anesthesia Hx:  No problems with previous Anesthesia  Denies Family Hx of Anesthesia complications.   Denies Personal Hx of Anesthesia complications.   Social:  Non-Smoker    Hematology/Oncology:  Hematology Normal   Oncology Normal     EENT/Dental:EENT/Dental Normal   Cardiovascular:   hyperlipidemia ECG has been reviewed.    Pulmonary:  Pulmonary Normal    Renal/:  Renal/ Normal     Hepatic/GI:   GERD    Musculoskeletal:   Arthritis     Neurological:   TIA, Headaches    Endocrine:  Endocrine Normal    Psych:   depression          Physical Exam  General: Well nourished, Cooperative, Alert and Oriented    Airway:  Mallampati: II   Mouth Opening: Normal  TM Distance: Normal  Tongue: Normal  Neck ROM: Normal ROM    Dental:  Intact    Chest/Lungs:  Clear to auscultation    Heart:  Rate: Normal  Rhythm: Regular Rhythm  Sounds: Normal        Anesthesia Plan  Type of Anesthesia, risks & benefits discussed:    Anesthesia Type: Gen Natural Airway  Intra-op Monitoring Plan: Standard ASA Monitors  Informed Consent: Informed consent signed with the Patient and all parties understand the risks and agree with anesthesia plan.  All questions answered.   ASA Score: 2    Ready For Surgery From Anesthesia Perspective.     .

## 2023-05-12 NOTE — TRANSFER OF CARE
"Anesthesia Transfer of Care Note    Patient: Elle Hernandez    Procedure(s) Performed: Procedure(s) (LRB):  EGD (ESOPHAGOGASTRODUODENOSCOPY) (N/A)    Patient location: GI    Anesthesia Type: general    Transport from OR: Transported from OR on room air with adequate spontaneous ventilation    Post pain: adequate analgesia    Post assessment: no apparent anesthetic complications    Post vital signs: stable    Level of consciousness: awake    Complications: none    Transfer of care protocol was followed      Last vitals:   Visit Vitals  BP (!) 162/78 (BP Location: Left arm, Patient Position: Lying)   Pulse 81   Temp 36.9 °C (98.4 °F) (Oral)   Resp 16   Ht 5' 4" (1.626 m)   Wt 68 kg (150 lb)   SpO2 100%   BMI 25.75 kg/m²     "

## 2023-05-15 DIAGNOSIS — R94.39 ABNORMAL STRESS TEST: Primary | ICD-10-CM

## 2023-05-15 DIAGNOSIS — R94.31 ABNORMAL ECG: ICD-10-CM

## 2023-05-17 ENCOUNTER — PATIENT MESSAGE (OUTPATIENT)
Dept: FAMILY MEDICINE | Facility: CLINIC | Age: 68
End: 2023-05-17

## 2023-05-17 ENCOUNTER — TELEPHONE (OUTPATIENT)
Dept: CARDIOLOGY | Facility: CLINIC | Age: 68
End: 2023-05-17
Payer: MEDICARE

## 2023-05-17 NOTE — TELEPHONE ENCOUNTER
----- Message from Lex Feng sent at 5/17/2023  2:38 PM CDT -----  Regarding: NM Test Scheduling  Contact: Pa at [phone]  Type:  Sooner Appointment Request    Caller is requesting a sooner appointment.      Name of Caller:  Pa    When is the first available appointment?  Dept Book    Symptoms:  NM Test on Order Chart     Best Call Back Number:  [phone]    Additional Information:

## 2023-05-18 ENCOUNTER — PATIENT MESSAGE (OUTPATIENT)
Dept: FAMILY MEDICINE | Facility: CLINIC | Age: 68
End: 2023-05-18

## 2023-05-18 DIAGNOSIS — Z12.31 SCREENING MAMMOGRAM FOR HIGH-RISK PATIENT: Primary | ICD-10-CM

## 2023-05-30 ENCOUNTER — TELEPHONE (OUTPATIENT)
Dept: FAMILY MEDICINE | Facility: CLINIC | Age: 68
End: 2023-05-30

## 2023-05-30 NOTE — TELEPHONE ENCOUNTER
----- Message from Beena Griffin MA sent at 5/30/2023  4:28 PM CDT -----    ----- Message -----  From: Michela Gray  Sent: 5/30/2023   4:26 PM CDT  To: [facility] Staff    RADIOLOGY

## 2023-06-06 ENCOUNTER — TELEPHONE (OUTPATIENT)
Dept: CARDIOLOGY | Facility: HOSPITAL | Age: 68
End: 2023-06-06

## 2023-06-06 ENCOUNTER — PATIENT MESSAGE (OUTPATIENT)
Dept: FAMILY MEDICINE | Facility: CLINIC | Age: 68
End: 2023-06-06

## 2023-06-06 NOTE — TELEPHONE ENCOUNTER
Patient advised, test will be at [facility] ([address]   Will need to register on the first floor at the main entrance.   Patient advised that arrival time is 6:50am.  Patient advised that she may be here about 3.5-4 hours, and may want to bring something to occupy their time, as there will be periods of waiting.    Patient advised, may take her medications prior to testing if you need to.  Advised if she needs to eat to take her medications, please keep it light, like toast and juice.    Patient advised to avoid all caffeine 12 hours prior to testing.  This includes decaf tea and coffee.    Will provide peanut butter crackers for a snack after stress test.  If patient would prefer something else, please bring a snack from home.    Wear comfortable clothing.   No lotions, oils, or powders to the upper chest area. May wear deodorant.    No metal jewelry, buttons, or zippers to the upper body.  Patient verbalizes understanding of instructions.       Will send instructions via [employer] as well.

## 2023-06-07 ENCOUNTER — HOSPITAL ENCOUNTER (OUTPATIENT)
Dept: RADIOLOGY | Facility: HOSPITAL | Age: 68
Discharge: HOME | End: 2023-06-07
Attending: PHYSICIAN ASSISTANT
Payer: MEDICARE

## 2023-06-07 ENCOUNTER — HOSPITAL ENCOUNTER (OUTPATIENT)
Dept: CARDIOLOGY | Facility: HOSPITAL | Age: 68
Discharge: HOME | End: 2023-06-07
Attending: PHYSICIAN ASSISTANT
Payer: MEDICARE

## 2023-06-07 DIAGNOSIS — R94.39 ABNORMAL STRESS TEST: ICD-10-CM

## 2023-06-07 DIAGNOSIS — R94.31 ABNORMAL ECG: ICD-10-CM

## 2023-06-07 PROCEDURE — A9502 TC99M TETROFOSMIN: HCPCS

## 2023-06-07 PROCEDURE — 93018 CV STRESS TEST I&R ONLY: CPT | Mod: ,,, | Performed by: INTERNAL MEDICINE

## 2023-06-07 PROCEDURE — 78452 HT MUSCLE IMAGE SPECT MULT: CPT | Mod: 26,,, | Performed by: INTERNAL MEDICINE

## 2023-06-07 PROCEDURE — 93018 NUCLEAR STRESS - CARDIOLOGY INTERPRETED (CUPID ONLY): ICD-10-PCS | Mod: ,,, | Performed by: INTERNAL MEDICINE

## 2023-06-07 PROCEDURE — 78452 NUCLEAR STRESS - CARDIOLOGY INTERPRETED (CUPID ONLY): ICD-10-PCS | Mod: 26,,, | Performed by: INTERNAL MEDICINE

## 2023-06-07 PROCEDURE — 93017 CV STRESS TEST TRACING ONLY: CPT

## 2023-06-07 PROCEDURE — 93016 CV STRESS TEST SUPVJ ONLY: CPT | Mod: ,,,

## 2023-06-07 PROCEDURE — 93016 NUCLEAR STRESS - CARDIOLOGY INTERPRETED (CUPID ONLY): ICD-10-PCS | Mod: ,,,

## 2023-06-07 RX ORDER — REGADENOSON 0.08 MG/ML
0.4 INJECTION, SOLUTION INTRAVENOUS ONCE
Status: COMPLETED | OUTPATIENT
Start: 2023-06-07 | End: 2023-06-07

## 2023-06-07 RX ADMIN — REGADENOSON 0.4 MG: 0.08 INJECTION, SOLUTION INTRAVENOUS at 08:06

## 2023-06-08 ENCOUNTER — PATIENT MESSAGE (OUTPATIENT)
Dept: FAMILY MEDICINE | Facility: CLINIC | Age: 68
End: 2023-06-08

## 2023-06-08 LAB
CV PHARM DOSE: 0.4 MG
CV STRESS BASE HR: 79 BPM
DIASTOLIC BLOOD PRESSURE: 80 MMHG
EJECTION FRACTION- HIGH: 65 %
END DIASTOLIC INDEX-HIGH: 153 ML/M2
END DIASTOLIC INDEX-LOW: 93 ML/M2
END SYSTOLIC INDEX-HIGH: 71 ML/M2
END SYSTOLIC INDEX-LOW: 31 ML/M2
NUC REST DIASTOLIC VOLUME INDEX: 74
NUC REST EJECTION FRACTION: 64
NUC REST SYSTOLIC VOLUME INDEX: 27
NUC STRESS DIASTOLIC VOLUME INDEX: 60
NUC STRESS EJECTION FRACTION: 75 %
NUC STRESS SYSTOLIC VOLUME INDEX: 15
OHS CV CPX 1 MINUTE RECOVERY HEART RATE: 105 BPM
OHS CV CPX 85 PERCENT MAX PREDICTED HEART RATE MALE: 125
OHS CV CPX MAX PREDICTED HEART RATE: 147
OHS CV CPX PATIENT IS FEMALE: 1
OHS CV CPX PATIENT IS MALE: 0
OHS CV CPX PEAK DIASTOLIC BLOOD PRESSURE: 80 MMHG
OHS CV CPX PEAK HEAR RATE: 105 BPM
OHS CV CPX PEAK RATE PRESSURE PRODUCT: NORMAL
OHS CV CPX PEAK SYSTOLIC BLOOD PRESSURE: 162 MMHG
OHS CV CPX PERCENT MAX PREDICTED HEART RATE ACHIEVED: 71
OHS CV CPX RATE PRESSURE PRODUCT PRESENTING: NORMAL
RETIRED EF AND QEF - SEE NOTES: 53 %
STRESS ST DEPRESSION: 0.8 MM
SYSTOLIC BLOOD PRESSURE: 162 MMHG

## 2023-06-09 ENCOUNTER — PATIENT MESSAGE (OUTPATIENT)
Dept: FAMILY MEDICINE | Facility: CLINIC | Age: 68
End: 2023-06-09

## 2023-07-06 ENCOUNTER — TELEPHONE (OUTPATIENT)
Dept: CARDIOLOGY | Facility: CLINIC | Age: 68
End: 2023-07-06
Payer: MEDICARE

## 2023-07-06 ENCOUNTER — PATIENT MESSAGE (OUTPATIENT)
Dept: FAMILY MEDICINE | Facility: CLINIC | Age: 68
End: 2023-07-06

## 2023-07-06 NOTE — TELEPHONE ENCOUNTER
----- Message from Jimdiya Chuy sent at 7/6/2023  3:19 PM CDT -----  Contact: Patient  Type:  Patient Call          Who Called: patient         Does the patient know what this is regarding?: Requesting a call back to have a appt scheduled ; pt said that she have a abnormal stress test and now she's feeling a flutter in her heart ; please advise           Would the patient rather a call back or a response via [facility]? Call           Best Call Back Number:  [phone]             Additional Information:

## 2023-08-31 ENCOUNTER — OFFICE VISIT (OUTPATIENT)
Dept: FAMILY MEDICINE | Facility: CLINIC | Age: 68
End: 2023-08-31
Payer: MEDICARE

## 2023-08-31 VITALS
BODY MASS INDEX: 26.8 KG/M2 | WEIGHT: 157 LBS | DIASTOLIC BLOOD PRESSURE: 86 MMHG | HEIGHT: 64 IN | HEART RATE: 86 BPM | OXYGEN SATURATION: 99 % | SYSTOLIC BLOOD PRESSURE: 138 MMHG

## 2023-08-31 DIAGNOSIS — F51.01 PRIMARY INSOMNIA: ICD-10-CM

## 2023-08-31 DIAGNOSIS — K21.9 GASTROESOPHAGEAL REFLUX DISEASE WITHOUT ESOPHAGITIS: ICD-10-CM

## 2023-08-31 DIAGNOSIS — Z23 VACCINE FOR STREPTOCOCCUS PNEUMONIAE AND INFLUENZA: ICD-10-CM

## 2023-08-31 DIAGNOSIS — E78.2 MIXED HYPERLIPIDEMIA: ICD-10-CM

## 2023-08-31 DIAGNOSIS — G43.109 COMPLICATED MIGRAINE: Primary | ICD-10-CM

## 2023-08-31 DIAGNOSIS — F32.A DEPRESSION, UNSPECIFIED DEPRESSION TYPE: ICD-10-CM

## 2023-08-31 DIAGNOSIS — M17.12 PRIMARY OSTEOARTHRITIS OF LEFT KNEE: ICD-10-CM

## 2023-08-31 PROCEDURE — 3075F SYST BP GE 130 - 139MM HG: CPT | Mod: CPTII,S$GLB,, | Performed by: PHYSICIAN ASSISTANT

## 2023-08-31 PROCEDURE — G0439 PR MEDICARE ANNUAL WELLNESS SUBSEQUENT VISIT: ICD-10-PCS | Mod: S$GLB,,, | Performed by: PHYSICIAN ASSISTANT

## 2023-08-31 PROCEDURE — 3079F PR MOST RECENT DIASTOLIC BLOOD PRESSURE 80-89 MM HG: ICD-10-PCS | Mod: CPTII,S$GLB,, | Performed by: PHYSICIAN ASSISTANT

## 2023-08-31 PROCEDURE — 1170F FXNL STATUS ASSESSED: CPT | Mod: CPTII,S$GLB,, | Performed by: PHYSICIAN ASSISTANT

## 2023-08-31 PROCEDURE — 3008F PR BODY MASS INDEX (BMI) DOCUMENTED: ICD-10-PCS | Mod: CPTII,S$GLB,, | Performed by: PHYSICIAN ASSISTANT

## 2023-08-31 PROCEDURE — G0009 ADMIN PNEUMOCOCCAL VACCINE: HCPCS | Mod: S$GLB,,, | Performed by: PHYSICIAN ASSISTANT

## 2023-08-31 PROCEDURE — 90677 PNEUMOCOCCAL CONJUGATE VACCINE 20-VALENT: ICD-10-PCS | Mod: S$GLB,,, | Performed by: PHYSICIAN ASSISTANT

## 2023-08-31 PROCEDURE — 3075F PR MOST RECENT SYSTOLIC BLOOD PRESS GE 130-139MM HG: ICD-10-PCS | Mod: CPTII,S$GLB,, | Performed by: PHYSICIAN ASSISTANT

## 2023-08-31 PROCEDURE — 3008F BODY MASS INDEX DOCD: CPT | Mod: CPTII,S$GLB,, | Performed by: PHYSICIAN ASSISTANT

## 2023-08-31 PROCEDURE — 3288F PR FALLS RISK ASSESSMENT DOCUMENTED: ICD-10-PCS | Mod: CPTII,S$GLB,, | Performed by: PHYSICIAN ASSISTANT

## 2023-08-31 PROCEDURE — 1170F PR FUNCTIONAL STATUS ASSESSED: ICD-10-PCS | Mod: CPTII,S$GLB,, | Performed by: PHYSICIAN ASSISTANT

## 2023-08-31 PROCEDURE — G0009 PNEUMOCOCCAL CONJUGATE VACCINE 20-VALENT: ICD-10-PCS | Mod: S$GLB,,, | Performed by: PHYSICIAN ASSISTANT

## 2023-08-31 PROCEDURE — 1101F PR PT FALLS ASSESS DOC 0-1 FALLS W/OUT INJ PAST YR: ICD-10-PCS | Mod: CPTII,S$GLB,, | Performed by: PHYSICIAN ASSISTANT

## 2023-08-31 PROCEDURE — 3288F FALL RISK ASSESSMENT DOCD: CPT | Mod: CPTII,S$GLB,, | Performed by: PHYSICIAN ASSISTANT

## 2023-08-31 PROCEDURE — G0439 PPPS, SUBSEQ VISIT: HCPCS | Mod: S$GLB,,, | Performed by: PHYSICIAN ASSISTANT

## 2023-08-31 PROCEDURE — 90677 PCV20 VACCINE IM: CPT | Mod: S$GLB,,, | Performed by: PHYSICIAN ASSISTANT

## 2023-08-31 PROCEDURE — 1159F PR MEDICATION LIST DOCUMENTED IN MEDICAL RECORD: ICD-10-PCS | Mod: CPTII,S$GLB,, | Performed by: PHYSICIAN ASSISTANT

## 2023-08-31 PROCEDURE — 3079F DIAST BP 80-89 MM HG: CPT | Mod: CPTII,S$GLB,, | Performed by: PHYSICIAN ASSISTANT

## 2023-08-31 PROCEDURE — 1159F MED LIST DOCD IN RCRD: CPT | Mod: CPTII,S$GLB,, | Performed by: PHYSICIAN ASSISTANT

## 2023-08-31 PROCEDURE — 1101F PT FALLS ASSESS-DOCD LE1/YR: CPT | Mod: CPTII,S$GLB,, | Performed by: PHYSICIAN ASSISTANT

## 2023-08-31 NOTE — PROGRESS NOTES
"  Elle Hernandez presented for a  Medicare AWV and comprehensive Health Risk Assessment today. The following components were reviewed and updated:    Medical history  Family History  Social history  Allergies and Current Medications  Health Risk Assessment  Health Maintenance  Care Team         ** See Completed Assessments for Annual Wellness Visit within the encounter summary.**         The following assessments were completed:  Living Situation  CAGE  Depression Screening  Timed Get Up and Go  Whisper Test  Cognitive Function Screening  Nutrition Screening  ADL Screening  PAQ Screening            Vitals:    08/31/23 1123 08/31/23 1126   BP: (!) 160/90 138/86   Pulse: 86    SpO2: 99%    Weight: 71.2 kg (157 lb)    Height: 5' 4" (1.626 m)      Body mass index is 26.95 kg/m².  Physical Exam  Constitutional:       General: She is not in acute distress.     Appearance: She is well-developed.   HENT:      Head: Normocephalic and atraumatic.   Eyes:      Conjunctiva/sclera: Conjunctivae normal.      Pupils: Pupils are equal, round, and reactive to light.   Neck:      Thyroid: No thyromegaly.   Cardiovascular:      Rate and Rhythm: Normal rate and regular rhythm.      Heart sounds: Normal heart sounds.   Pulmonary:      Effort: Pulmonary effort is normal.      Breath sounds: Normal breath sounds.   Abdominal:      General: Bowel sounds are normal. There is no distension.      Palpations: Abdomen is soft.      Tenderness: There is no abdominal tenderness.   Musculoskeletal:         General: Normal range of motion.      Cervical back: Normal range of motion and neck supple.   Skin:     General: Skin is warm and dry.      Findings: No erythema.   Neurological:      Mental Status: She is alert and oriented to person, place, and time.      Cranial Nerves: No cranial nerve deficit.             Diagnoses and health risks identified today and associated recommendations/orders:    1. Complicated migraine  Overall, migraine " headaches have improved in recent years.  She is not requiring any abortive or preventative medication at this time.    2. Depression, unspecified depression type  Mood and anxiety appear stable at this time.  Not on any medication for this.  She has good support system with  despite her mom's failing health.    3. Mixed hyperlipidemia  Most recent LDL at 122.  We discussed potential for medication but patient wishes to hold off at this time.    4. Gastroesophageal reflux disease without esophagitis  Well managed without medication.  Discussed dietary changes to keep this under control    5. Primary osteoarthritis of left knee  Overall improved.    6. Primary insomnia  Discussed sleep hygiene and methods to naturally help with her sleep.  Would like to avoid pharmacological aids of possible    7. Vaccine for streptococcus pneumoniae and influenza  Agrees to Prevnar 20.  - (In Office Administered) Pneumococcal Conjugate Vaccine (20 Valent) (IM)      Provided Estefanilyn with a 5-10 year written screening schedule and personal prevention plan. Recommendations were developed using the USPSTF age appropriate recommendations. Education, counseling, and referrals were provided as needed. After Visit Summary printed and given to patient which includes a list of additional screenings\tests needed.    No follow-ups on file.    Jorge Villeda PA-C

## 2023-09-14 ENCOUNTER — PATIENT MESSAGE (OUTPATIENT)
Dept: CARDIOLOGY | Facility: CLINIC | Age: 68
End: 2023-09-14

## 2023-09-14 ENCOUNTER — TELEPHONE (OUTPATIENT)
Dept: CARDIOLOGY | Facility: CLINIC | Age: 68
End: 2023-09-14

## 2023-09-14 ENCOUNTER — OFFICE VISIT (OUTPATIENT)
Dept: CARDIOLOGY | Facility: CLINIC | Age: 68
End: 2023-09-14
Payer: MEDICARE

## 2023-09-14 VITALS
WEIGHT: 152 LBS | OXYGEN SATURATION: 98 % | HEART RATE: 112 BPM | DIASTOLIC BLOOD PRESSURE: 72 MMHG | BODY MASS INDEX: 25.95 KG/M2 | HEIGHT: 64 IN | SYSTOLIC BLOOD PRESSURE: 142 MMHG

## 2023-09-14 DIAGNOSIS — R00.2 PALPITATIONS: ICD-10-CM

## 2023-09-14 DIAGNOSIS — R07.9 CHEST PAIN, UNSPECIFIED TYPE: Primary | ICD-10-CM

## 2023-09-14 DIAGNOSIS — G43.109 COMPLICATED MIGRAINE: ICD-10-CM

## 2023-09-14 DIAGNOSIS — E78.2 MIXED HYPERLIPIDEMIA: ICD-10-CM

## 2023-09-14 DIAGNOSIS — R94.31 NONSPECIFIC ABNORMAL ELECTROCARDIOGRAM (ECG) (EKG): ICD-10-CM

## 2023-09-14 PROCEDURE — 1101F PR PT FALLS ASSESS DOC 0-1 FALLS W/OUT INJ PAST YR: ICD-10-PCS | Mod: CPTII,S$GLB,, | Performed by: INTERNAL MEDICINE

## 2023-09-14 PROCEDURE — 3288F FALL RISK ASSESSMENT DOCD: CPT | Mod: CPTII,S$GLB,, | Performed by: INTERNAL MEDICINE

## 2023-09-14 PROCEDURE — 93000 EKG 12-LEAD: ICD-10-PCS | Mod: S$GLB,,, | Performed by: INTERNAL MEDICINE

## 2023-09-14 PROCEDURE — 1159F PR MEDICATION LIST DOCUMENTED IN MEDICAL RECORD: ICD-10-PCS | Mod: CPTII,S$GLB,, | Performed by: INTERNAL MEDICINE

## 2023-09-14 PROCEDURE — 3077F PR MOST RECENT SYSTOLIC BLOOD PRESSURE >= 140 MM HG: ICD-10-PCS | Mod: CPTII,S$GLB,, | Performed by: INTERNAL MEDICINE

## 2023-09-14 PROCEDURE — 3078F DIAST BP <80 MM HG: CPT | Mod: CPTII,S$GLB,, | Performed by: INTERNAL MEDICINE

## 2023-09-14 PROCEDURE — 3008F PR BODY MASS INDEX (BMI) DOCUMENTED: ICD-10-PCS | Mod: CPTII,S$GLB,, | Performed by: INTERNAL MEDICINE

## 2023-09-14 PROCEDURE — 1159F MED LIST DOCD IN RCRD: CPT | Mod: CPTII,S$GLB,, | Performed by: INTERNAL MEDICINE

## 2023-09-14 PROCEDURE — 3008F BODY MASS INDEX DOCD: CPT | Mod: CPTII,S$GLB,, | Performed by: INTERNAL MEDICINE

## 2023-09-14 PROCEDURE — 3078F PR MOST RECENT DIASTOLIC BLOOD PRESSURE < 80 MM HG: ICD-10-PCS | Mod: CPTII,S$GLB,, | Performed by: INTERNAL MEDICINE

## 2023-09-14 PROCEDURE — 99204 PR OFFICE/OUTPT VISIT, NEW, LEVL IV, 45-59 MIN: ICD-10-PCS | Mod: S$GLB,,, | Performed by: INTERNAL MEDICINE

## 2023-09-14 PROCEDURE — 99999 PR PBB SHADOW E&M-EST. PATIENT-LVL III: ICD-10-PCS | Mod: PBBFAC,,, | Performed by: INTERNAL MEDICINE

## 2023-09-14 PROCEDURE — 3077F SYST BP >= 140 MM HG: CPT | Mod: CPTII,S$GLB,, | Performed by: INTERNAL MEDICINE

## 2023-09-14 PROCEDURE — 1126F PR PAIN SEVERITY QUANTIFIED, NO PAIN PRESENT: ICD-10-PCS | Mod: CPTII,S$GLB,, | Performed by: INTERNAL MEDICINE

## 2023-09-14 PROCEDURE — 1160F PR REVIEW ALL MEDS BY PRESCRIBER/CLIN PHARMACIST DOCUMENTED: ICD-10-PCS | Mod: CPTII,S$GLB,, | Performed by: INTERNAL MEDICINE

## 2023-09-14 PROCEDURE — 1101F PT FALLS ASSESS-DOCD LE1/YR: CPT | Mod: CPTII,S$GLB,, | Performed by: INTERNAL MEDICINE

## 2023-09-14 PROCEDURE — 99204 OFFICE O/P NEW MOD 45 MIN: CPT | Mod: S$GLB,,, | Performed by: INTERNAL MEDICINE

## 2023-09-14 PROCEDURE — 99999 PR PBB SHADOW E&M-EST. PATIENT-LVL III: CPT | Mod: PBBFAC,,, | Performed by: INTERNAL MEDICINE

## 2023-09-14 PROCEDURE — 93000 ELECTROCARDIOGRAM COMPLETE: CPT | Mod: S$GLB,,, | Performed by: INTERNAL MEDICINE

## 2023-09-14 PROCEDURE — 1126F AMNT PAIN NOTED NONE PRSNT: CPT | Mod: CPTII,S$GLB,, | Performed by: INTERNAL MEDICINE

## 2023-09-14 PROCEDURE — 3288F PR FALLS RISK ASSESSMENT DOCUMENTED: ICD-10-PCS | Mod: CPTII,S$GLB,, | Performed by: INTERNAL MEDICINE

## 2023-09-14 PROCEDURE — 1160F RVW MEDS BY RX/DR IN RCRD: CPT | Mod: CPTII,S$GLB,, | Performed by: INTERNAL MEDICINE

## 2023-09-14 RX ORDER — SODIUM CHLORIDE 9 MG/ML
INJECTION, SOLUTION INTRAVENOUS ONCE
Status: CANCELLED | OUTPATIENT
Start: 2023-09-14 | End: 2023-09-14

## 2023-09-14 RX ORDER — DIPHENHYDRAMINE HCL 25 MG
50 CAPSULE ORAL
Status: CANCELLED | OUTPATIENT
Start: 2023-09-14

## 2023-09-14 RX ORDER — METOPROLOL SUCCINATE 25 MG/1
25 TABLET, EXTENDED RELEASE ORAL DAILY
Qty: 30 TABLET | Refills: 11 | Status: ON HOLD | OUTPATIENT
Start: 2023-09-14 | End: 2023-10-19

## 2023-09-14 NOTE — TELEPHONE ENCOUNTER
----- Message from Yuliana Salinas sent at 9/14/2023  3:15 PM CDT -----  Type: Needs Medical Advice  Who Called:  pt  Symptoms (please be specific):  pt said she need to speak to the office said she scheduled for an angiogram on 9/18 and she have not had any pre op appts yet--please call and advise  Best Call Back Number: [phone] or [place] [phone] ([place])     Additional Information: thank you

## 2023-09-14 NOTE — PROGRESS NOTES
Subjective:    Patient ID:  Elle Hernandez is a 68 y.o. female patient here for evaluation Chest Pain (New patient , she is having chest heaviness. Some sob with exertion. )      History of Present Illness:   68-year-old lady with in significant past medical history has been increased amount of stress with family related issues.  She is taking care of her elderly mother who is [place] she drives back and forth regularly.  She has been having intermittent episodes of palpitations which she calls it as panic attack some diaphoresis she has to crank of the air are deep breaths and other maneuvers and she gradually subsides.    She is also reporting a recurrent episodes of chest discomfort in the substernal area and radiation to the left arm and wrist no specific aggravating or relieving factors on this.  She does suffer from dyspeptic symptoms periodically.    There is no swelling in his legs no arm neck or jaw pain noted.  She had an EKG that was noted to be abnormal further underwent noninvasive testing with myocardial perfusion imaging study EKG remained abnormal during the stress however the perfusion imaging showed balanced abnormalities consistent with attenuation and no large reversible ischemia identified.  Gated images showed preserved function.      Occasionally she reports pain in the back region.  There is no blood in the stools no black stools no blood in the urine and no urinary symptoms frequency urgency burning      She has history of recurrent complex migraine headaches although this has been less frequent lately she has not had a bad 1 in 10 years.  She does have some occasional minor flare ups.    Social history she is nonsmoker life time   History of ethanol usage noted she takes about 2 glasses of wine on average day.  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    She is presently taking care in part of her elderly mother.      Allergies include penicillin.  And hydrocodone over 5 mg.     Family history:  Father  as well as 2 grandmothers have heart disease at advanced age.      Mother is alive and she had a stroke.      Review of patient's allergies indicates:   Allergen Reactions    Pcn [penicillins] Hives and Nausea Only    Hydrocodone Nausea Only       Past Medical History:   Diagnosis Date    Allergy     Migraine      Past Surgical History:   Procedure Laterality Date    ESOPHAGOGASTRODUODENOSCOPY N/A 5/12/2023    Procedure: EGD (ESOPHAGOGASTRODUODENOSCOPY);  Surgeon: Cruz Whiteny MD;  Location: [facility];  Service: Endoscopy;  Laterality: N/A;    facial biopsy Right 08/15/2021    right forehead biopsy result basal cell carcinoma    TONSILLECTOMY      TUBAL LIGATION       Social History     Tobacco Use    Smoking status: Never     Passive exposure: Yes    Smokeless tobacco: Never   Substance Use Topics    Alcohol use: Yes     Comment: 2 glasses of red wine daily    Drug use: No        Review of Systems   As noted in HPI in addition     Constitutional: Negative for chills, fatigue and fever.   Eyes: No double vision, No blurred vision  Neuro: No headaches, No dizziness  Respiratory: Negative for cough, occasional shortness of breath with wheezing.    Cardiovascular:  Positive for recurrent episodes of chest discomfort described above frequent episodes of palpitations associated with anxiety.  And sometimes diaphoresis  Gastrointestinal: Negative for abdominal pain, No melena, diarrhea, nausea and vomiting.   Genitourinary: Negative for dysuria and frequency, Negative for hematuria  Skin: Negative for bruising, Negative for edema or discoloration noted.   Endocrine: Negative for polyphagia, Negative for heat intolerance, Negative for cold intolerance  Psychiatric: Negative for depression, Negative for anxiety, Negative for memory loss  Musculoskeletal: Negative for neck pain, Negative for muscle weakness, Negative for back pain          Objective        Vitals:    09/14/23 1129   BP: (!)  142/72   Pulse: (!) 112       LIPIDS - LAST 2   Lab Results   Component Value Date    CHOL 219 (H) 05/09/2022    CHOL 231 (H) 04/13/2021    HDL 78 05/09/2022    HDL 73 04/13/2021    LDLCALC 122 (H) 05/09/2022    LDLCALC 139 (H) 04/13/2021    TRIG 88 05/09/2022    TRIG 91 04/13/2021    CHOLHDL 2.8 05/09/2022    CHOLHDL 3.2 04/13/2021       CBC - LAST 2  Lab Results   Component Value Date    WBC 7.01 05/09/2023    WBC 7.8 08/16/2022    RBC 4.34 05/09/2023    RBC 4.30 08/16/2022    HGB 13.2 05/09/2023    HGB 13.0 08/16/2022    HCT 39.9 05/09/2023    HCT 39.4 08/16/2022    MCV 92 05/09/2023    MCV 91.6 08/16/2022    MCH 30.4 05/09/2023    MCH 30.2 08/16/2022    MCHC 33.1 05/09/2023    MCHC 33.0 08/16/2022    RDW 12.9 05/09/2023    RDW 12.5 08/16/2022     05/09/2023     08/16/2022    MPV 8.3 (L) 05/09/2023    MPV 8.9 08/16/2022    GRAN 3.8 04/13/2019    GRAN 55.9 04/13/2019    LYMPH 2,457 08/16/2022    LYMPH 31.5 08/16/2022    MONO 538 08/16/2022    MONO 6.9 08/16/2022    BASO 39 08/16/2022    BASO 32 05/09/2022    NRBC 0 04/13/2019       CHEMISTRY & LIVER FUNCTION - LAST 2  Lab Results   Component Value Date     08/16/2022     05/09/2022    K 4.1 08/16/2022    K 4.7 05/09/2022     08/16/2022     05/09/2022    CO2 28 08/16/2022    CO2 27 05/09/2022    ANIONGAP 9 04/13/2019    ANIONGAP 9 04/08/2014    BUN 17 08/16/2022    BUN 16 05/09/2022    CREATININE 0.99 08/16/2022    CREATININE 0.91 05/09/2022    GLU 89 08/16/2022    GLU 92 05/09/2022    CALCIUM 9.2 08/16/2022    CALCIUM 9.9 05/09/2022    ALBUMIN 4.6 05/09/2022    ALBUMIN 4.6 04/13/2021    PROT 6.9 05/09/2022    PROT 6.9 04/13/2021    ALKPHOS 81 04/13/2019    ALKPHOS 70 04/08/2014    ALT 10 05/09/2022    ALT 16 04/13/2021    AST 15 05/09/2022    AST 18 04/13/2021    BILITOT 0.5 05/09/2022    BILITOT 0.5 04/13/2021        CARDIAC PROFILE - LAST 2  Lab Results   Component Value Date    TROPONINI <0.012 04/13/2019        COAGULATION  - LAST 2  Lab Results   Component Value Date    LABPT 13.5 04/13/2019    INR 1.1 04/13/2019    APTT 30.1 04/13/2019       ENDOCRINE & PSA - LAST 2  Lab Results   Component Value Date    TSH 1.90 04/13/2021    TSH 3.074 04/08/2014        ECHOCARDIOGRAM RESULTS  Results for orders placed during the hospital encounter of 07/12/19    Transthoracic Echo (TTE) Complete 2D    Interpretation Summary  · Concentric left ventricular remodeling.  · Normal left ventricular systolic function. The estimated ejection fraction is 60%  · Normal LV diastolic function.  · Normal right ventricular systolic function.  · Normal central venous pressure (3 mm Hg).      CURRENT/PREVIOUS VISIT EKG  Results for orders placed or performed during the hospital encounter of 05/09/23   EKG 12-lead    Collection Time: 05/09/23 12:37 PM    Narrative    Test Reason : Z01.818,    Vent. Rate : 076 BPM     Atrial Rate : 076 BPM     P-R Int : 118 ms          QRS Dur : 066 ms      QT Int : 386 ms       P-R-T Axes : 068 080 097 degrees     QTc Int : 434 ms    Sinus rhythm with Premature atrial complexes  Septal infarct ,age undetermined  Abnormal ECG  When compared with ECG of 07-APR-2014 16:21,  Premature atrial complexes are now Present  Septal infarct is now Present  Nonspecific T wave abnormality now evident in Lateral leads  Confirmed by Bill Morejon MD ([id number]) on 5/16/2023 8:57:00 PM    Referred By:             Confirmed By:Bill Morejon MD     No valid procedures specified.   Results for orders placed during the hospital encounter of 06/07/23    Nuclear Stress - Cardiology Interpreted    Interpretation Summary    Normal myocardial perfusion scan. There is no evidence of myocardial ischemia or infarction.    The gated perfusion images showed an ejection fraction of 64% at rest. The gated perfusion images showed an ejection fraction of 75% post stress. Normal ejection fraction is greater than 53%.    There is normal wall motion at rest and post  stress.    LV cavity size is normal at rest and normal at stress.    The ECG portion of the study is abnormal but not diagnostic due to resting ST-T abnormalities.    The patient reported no chest pain during the stress test.    No valid procedures specified.      09/14/2023 EKG shows sinus tachycardia rate of 102 beats per minute right axis deviation septal infarct pattern ST T wave abnormalities in the inferior lateral leads.    PREVIOUS STRESS TEST              PREVIOUS ANGIOGRAM        PHYSICAL EXAM    GENERAL: well built, well nourished, well-developed in no apparent distress alert and oriented.   HEENT: Normocephalic. Pupils normal and conjunctivae normal.  Mucous membranes normal, no cyanosis or icterus, trachea central,no pallor or icterus is noted..   NECK: No JVD. No bruit..   THYROID: Thyroid not enlarged. No nodules present..   CARDIAC: Regular rate and rhythm. S1 is normal.S2 is normal.No gallops, clicks or murmurs noted at this time.  CHEST ANATOMY: normal.   LUNGS: Clear to auscultation. No wheezing or rhonchi..   ABDOMEN: Soft no masses or organomegaly.  No abdomen pulsations or bruits.  Normal bowel sounds. No pulsations and no masses felt, No guarding or rebound.   EXTREMITIES: No cyanosis, clubbing or edema noted at this time., no calf tenderness bilaterally.   PERIPHERAL VASCULAR SYSTEM: Good palpable distal pulses.   CENTRAL NERVOUS SYSTEM: No focal motor or sensory deficits noted.   SKIN: Skin without lesions, moist, well perfused.   MUSCLE STRENGTH & TONE: No noteable weakness, atrophy or abnormal movement.     I HAVE REVIEWED :    The vital signs, nurses notes, and all the pertinent radiology and labs.        No current outpatient medications       Assessment & Plan     Nonspecific abnormal electrocardiogram (ECG) (EKG)  Patient has abnormal EKG and recurrent episodes of chest discomfort and abnormal stress test recommend to consider further cardiac workup.  She will benefit from angiographic  assessment for more definitive diagnosis.  Discussed with patient the risks and benefits of the procedure including, but not limited to, the following 1:1000 risk of Heart attack, stroke and death   And 3-5% Risk of bleeding, vessel damage, even needing surgical repair and the need for emergent CABG Surgery.  If intervention is needed atrial risk of emergent bypass surgery, MI is 1-3%  All questions have been answered to patient's full satisfaction.  Informed consent obtained for both cardiac catheterization and possible PCI  Will advise the patient to have nothing by mouth post midnight and proceed with the coronary angiography possible PCI in the morning of the procedure.  I will schedule this at the earliest convenient time for for the patient and intervention Cardiology      Chest pain  She has recurrent episodes of chest pains suspect anginal equivalent.  Will start on beta-blocker metoprolol succinate 25 mg once daily the primary goal is to reduce the heart rate and reduce the demand.  In the meantime I will also start her on aspirin therapy at 81 mg daily while workup is being initiated.  Will hold off on Plavix therapy under until angiogram is completed    Mixed hyperlipidemia  She has history of mixed dyslipidemia recommend to start on Lipitor at 20 mg daily and titrate this upwards as tolerated.    Complicated migraine  Migraines and she does have impediment of a vision as well as impaired speech with these episodes.  And she is only seeking primary care physician at this time regarding this          Follow up in about 4 weeks (around 10/12/2023).

## 2023-09-14 NOTE — ASSESSMENT & PLAN NOTE
She has recurrent episodes of chest pains suspect anginal equivalent.  Will start on beta-blocker metoprolol succinate 25 mg once daily the primary goal is to reduce the heart rate and reduce the demand.  In the meantime I will also start her on aspirin therapy at 81 mg daily while workup is being initiated.  Will hold off on Plavix therapy under until angiogram is completed

## 2023-09-14 NOTE — ASSESSMENT & PLAN NOTE
Migraines and she does have impediment of a vision as well as impaired speech with these episodes.  And she is only seeking primary care physician at this time regarding this

## 2023-09-14 NOTE — ASSESSMENT & PLAN NOTE
Patient has abnormal EKG and recurrent episodes of chest discomfort and abnormal stress test recommend to consider further cardiac workup.  She will benefit from angiographic assessment for more definitive diagnosis.  Discussed with patient the risks and benefits of the procedure including, but not limited to, the following 1:1000 risk of Heart attack, stroke and death   And 3-5% Risk of bleeding, vessel damage, even needing surgical repair and the need for emergent CABG Surgery.  If intervention is needed atrial risk of emergent bypass surgery, MI is 1-3%  All questions have been answered to patient's full satisfaction.  Informed consent obtained for both cardiac catheterization and possible PCI  Will advise the patient to have nothing by mouth post midnight and proceed with the coronary angiography possible PCI in the morning of the procedure.  I will schedule this at the earliest convenient time for for the patient and intervention Cardiology

## 2023-09-14 NOTE — ASSESSMENT & PLAN NOTE
She has history of mixed dyslipidemia recommend to start on Lipitor at 20 mg daily and titrate this upwards as tolerated.

## 2023-09-25 ENCOUNTER — PATIENT MESSAGE (OUTPATIENT)
Dept: ADMINISTRATIVE | Facility: HOSPITAL | Age: 68
End: 2023-09-25
Payer: MEDICARE

## 2023-09-26 ENCOUNTER — TELEPHONE (OUTPATIENT)
Dept: CARDIOLOGY | Facility: CLINIC | Age: 68
End: 2023-09-26

## 2023-09-26 ENCOUNTER — PATIENT OUTREACH (OUTPATIENT)
Dept: ADMINISTRATIVE | Facility: HOSPITAL | Age: 68
End: 2023-09-26
Payer: MEDICARE

## 2023-09-26 DIAGNOSIS — Z78.0 POSTMENOPAUSAL STATUS: Primary | ICD-10-CM

## 2023-09-26 NOTE — TELEPHONE ENCOUNTER
----- Message from Froy Mott sent at 9/26/2023  3:56 PM CDT -----  Type: Need Medical Advice   Who Called: Patient  Best callback number: [phone]  Additional Information: Patient called to reschedule her missed angiogram  Please call to further assist, Thanks.

## 2023-09-26 NOTE — PROGRESS NOTES
Population Health Chart Review & Patient Outreach Details:     Reason for Outreach Encounter:     []  Non-Compliant Report   [x]  Payor Report ([employer], PHN, BCBS, MSSP, MCIP, UHC, etc.)   []  Pre-Visit Chart Review     Updates Requested / Reviewed:     []  Care Everywhere    []     []  External Sources ([facility], [facility], [facility], etc.)   [x]  Care Team Updated    Patient Outreach Method:    [x]  Telephone Outreach Completed   [x] Successful   [] Left Voicemail   [] Unable to Contact (wrong number, no voicemail)  []  [employer] Portal Outreach Sent  []  Letter Outreach Mailed  []  Fax Sent for External Records  []  External Records Upload    Health Maintenance Topics Addressed and Outreach Outcomes / Actions Taken:        []      Breast Cancer Screening []  Mammo Scheduled      []  External Records Requested     []  Added Reminder to Complete to Upcoming Primary Care Appt Notes     []  Patient Declined     []  Patient Will Call Back to Schedule     []  Patient Will Schedule with External Provider / Order Routed if Applicable             []       Cervical Cancer Screening []  Pap Scheduled      []  External Records Requested     []  Added Reminder to Complete to Upcoming Primary Care Appt Notes     []  Patient Declined     []  Patient Will Call Back to Schedule     []  Patient Will Schedule with External Provider               []          Colorectal Cancer Screening []  Colonoscopy Case Request or Referral Placed     []  External Records Requested     []  Added Reminder to Complete to Upcoming Primary Care Appt Notes     []  Patient Declined     []  Patient Will Call Back to Schedule     []  Patient Will Schedule with External Provider     []  Fit Kit Mailed (add the SmartPhrase under additional notes)     []  Reminded Patient to Complete Home Test             []      Diabetic Eye Exam []  Eye Camera Scheduled or Optometry Referral Placed     []  External Records Requested     []  Added Reminder to Complete to  Upcoming Primary Care Appt Notes     []  Patient Declined     []  Patient Will Call Back to Schedule     []  Patient Will Schedule with External Provider             []      Blood Pressure Control []  Primary Care Follow Up Visit Scheduled     []  Remote Blood Pressure Reading Captured     []  Added Reminder to Complete to Upcoming Primary Care Appt Notes     []  Patient Declined     []  Patient Will Call Back / Patient Will Send Portal Message with Reading     []  Patient Will Call Back to Schedule Provider Visit             []       HbA1c & Other Labs []  Lab Appt Scheduled for Due Labs     []  Primary Care Follow Up Visit Scheduled      []  Reminded Patient to Complete Home Test     []  Added Reminder to Complete to Upcoming Primary Care Appt Notes     []  Patient Declined     []  Patient Will Call Back to Schedule     []  Patient Will Schedule with External Provider / Order Routed if Applicable           []    Schedule Primary Care Appt []  Primary Care Appt Scheduled     []  Patient Declined     []  Patient Will Call Back to Schedule     []  Pt Established with External Provider & Updated Care Team             []      Medication Adherence []  Primary Care Appointment Scheduled     []  Added Reminder to Upcoming Primary Care Appt Notes     []  Patient Reminded to  Prescription     []  Patient Declined, Provider Notified if Needed     []  Sent Provider Message to Review and/or Add Exclusion to Problem List             [x]      Osteoporosis Screening []  DXA Appointment Scheduled     []  External Records Requested     []  Added Reminder to Complete to Upcoming Primary Care Appt Notes     []  Patient Declined     []  Patient Will Call Back to Schedule     [x]  Patient Will Schedule with External Provider / Order Routed if Applicable     Additional Care Coordinator Notes:    WOG Placed For DEXA Scan. Order Routed To [facility] Per Pt Request.     Further Action Needed If Patient Returns Outreach:

## 2023-09-27 ENCOUNTER — PATIENT MESSAGE (OUTPATIENT)
Dept: CARDIOLOGY | Facility: CLINIC | Age: 68
End: 2023-09-27
Payer: MEDICARE

## 2023-09-27 ENCOUNTER — PATIENT MESSAGE (OUTPATIENT)
Dept: FAMILY MEDICINE | Facility: CLINIC | Age: 68
End: 2023-09-27

## 2023-10-02 ENCOUNTER — PATIENT MESSAGE (OUTPATIENT)
Dept: FAMILY MEDICINE | Facility: CLINIC | Age: 68
End: 2023-10-02

## 2023-10-04 ENCOUNTER — PATIENT MESSAGE (OUTPATIENT)
Dept: CARDIOLOGY | Facility: CLINIC | Age: 68
End: 2023-10-04
Payer: MEDICARE

## 2023-10-04 RX ORDER — SODIUM CHLORIDE 9 MG/ML
INJECTION, SOLUTION INTRAVENOUS ONCE
Status: CANCELLED | OUTPATIENT
Start: 2023-10-04 | End: 2023-10-04

## 2023-10-04 RX ORDER — DIPHENHYDRAMINE HCL 25 MG
50 CAPSULE ORAL
Status: CANCELLED | OUTPATIENT
Start: 2023-10-04

## 2023-10-12 ENCOUNTER — PATIENT MESSAGE (OUTPATIENT)
Dept: CARDIOLOGY | Facility: CLINIC | Age: 68
End: 2023-10-12
Payer: MEDICARE

## 2023-10-13 ENCOUNTER — TELEPHONE (OUTPATIENT)
Dept: FAMILY MEDICINE | Facility: CLINIC | Age: 68
End: 2023-10-13

## 2023-10-13 NOTE — TELEPHONE ENCOUNTER
----- Message from Mali Betancourt sent at 10/13/2023  9:21 AM CDT -----  Can you please fax the order for a dexa scan signed by dr. Hernandez to [facility]   [phone] fax     
Faxed to [facility]  
N/A

## 2023-10-17 ENCOUNTER — HOSPITAL ENCOUNTER (OUTPATIENT)
Dept: RADIOLOGY | Facility: HOSPITAL | Age: 68
Discharge: HOME | End: 2023-10-17
Attending: INTERNAL MEDICINE
Payer: MEDICARE

## 2023-10-17 ENCOUNTER — HOSPITAL ENCOUNTER (OUTPATIENT)
Dept: PREADMISSION TESTING | Facility: HOSPITAL | Age: 68
Discharge: HOME | End: 2023-10-17
Attending: INTERNAL MEDICINE
Payer: MEDICARE

## 2023-10-17 DIAGNOSIS — R00.2 PALPITATIONS: ICD-10-CM

## 2023-10-17 DIAGNOSIS — R94.31 NONSPECIFIC ABNORMAL ELECTROCARDIOGRAM (ECG) (EKG): ICD-10-CM

## 2023-10-17 DIAGNOSIS — R07.89 OTHER CHEST PAIN: ICD-10-CM

## 2023-10-17 DIAGNOSIS — R07.9 CHEST PAIN, UNSPECIFIED TYPE: ICD-10-CM

## 2023-10-17 LAB
ANION GAP SERPL CALC-SCNC: 7 MMOL/L (ref 8–16)
BASOPHILS # BLD AUTO: 0.03 K/UL (ref 0–0.2)
BASOPHILS NFR BLD: 0.6 % (ref 0–1.9)
BUN SERPL-MCNC: 14 MG/DL (ref 8–23)
CALCIUM SERPL-MCNC: 9.7 MG/DL (ref 8.7–10.5)
CHLORIDE SERPL-SCNC: 102 MMOL/L (ref 95–110)
CO2 SERPL-SCNC: 28 MMOL/L (ref 23–29)
CREAT SERPL-MCNC: 1 MG/DL (ref 0.5–1.4)
DIFFERENTIAL METHOD: ABNORMAL
EOSINOPHIL # BLD AUTO: 0.1 K/UL (ref 0–0.5)
EOSINOPHIL NFR BLD: 2.5 % (ref 0–8)
ERYTHROCYTE [DISTWIDTH] IN BLOOD BY AUTOMATED COUNT: 13 % (ref 11.5–14.5)
EST. GFR  (NO RACE VARIABLE): >60 ML/MIN/1.73 M^2
GLUCOSE SERPL-MCNC: 103 MG/DL (ref 70–110)
HCT VFR BLD AUTO: 38.2 % (ref 37–48.5)
HGB BLD-MCNC: 13.4 G/DL (ref 12–16)
IMM GRANULOCYTES # BLD AUTO: 0.01 K/UL (ref 0–0.04)
IMM GRANULOCYTES NFR BLD AUTO: 0.2 % (ref 0–0.5)
LYMPHOCYTES # BLD AUTO: 1.8 K/UL (ref 1–4.8)
LYMPHOCYTES NFR BLD: 33.5 % (ref 18–48)
MCH RBC QN AUTO: 32.1 PG (ref 27–31)
MCHC RBC AUTO-ENTMCNC: 35.1 G/DL (ref 32–36)
MCV RBC AUTO: 91 FL (ref 82–98)
MONOCYTES # BLD AUTO: 0.4 K/UL (ref 0.3–1)
MONOCYTES NFR BLD: 6.9 % (ref 4–15)
NEUTROPHILS # BLD AUTO: 2.9 K/UL (ref 1.8–7.7)
NEUTROPHILS NFR BLD: 56.3 % (ref 38–73)
NRBC BLD-RTO: 0 /100 WBC
PLATELET # BLD AUTO: 286 K/UL (ref 150–450)
PMV BLD AUTO: 8.2 FL (ref 9.2–12.9)
POTASSIUM SERPL-SCNC: 4.3 MMOL/L (ref 3.5–5.1)
RBC # BLD AUTO: 4.18 M/UL (ref 4–5.4)
SODIUM SERPL-SCNC: 137 MMOL/L (ref 136–145)
WBC # BLD AUTO: 5.22 K/UL (ref 3.9–12.7)

## 2023-10-17 PROCEDURE — 80048 BASIC METABOLIC PNL TOTAL CA: CPT | Performed by: INTERNAL MEDICINE

## 2023-10-17 PROCEDURE — 93010 ELECTROCARDIOGRAM REPORT: CPT | Mod: ,,, | Performed by: INTERNAL MEDICINE

## 2023-10-17 PROCEDURE — 85025 COMPLETE CBC W/AUTO DIFF WBC: CPT | Performed by: INTERNAL MEDICINE

## 2023-10-17 PROCEDURE — 93010 EKG 12-LEAD: ICD-10-PCS | Mod: ,,, | Performed by: INTERNAL MEDICINE

## 2023-10-17 PROCEDURE — 71046 X-RAY EXAM CHEST 2 VIEWS: CPT | Mod: TC

## 2023-10-17 PROCEDURE — 93005 ELECTROCARDIOGRAM TRACING: CPT | Performed by: INTERNAL MEDICINE

## 2023-10-19 ENCOUNTER — HOSPITAL ENCOUNTER (OUTPATIENT)
Facility: HOSPITAL | Age: 68
Discharge: HOME | End: 2023-10-19
Attending: INTERNAL MEDICINE | Admitting: INTERNAL MEDICINE
Payer: MEDICARE

## 2023-10-19 ENCOUNTER — TELEPHONE (OUTPATIENT)
Dept: FAMILY MEDICINE | Facility: CLINIC | Age: 68
End: 2023-10-19

## 2023-10-19 VITALS
HEART RATE: 83 BPM | DIASTOLIC BLOOD PRESSURE: 67 MMHG | SYSTOLIC BLOOD PRESSURE: 111 MMHG | RESPIRATION RATE: 11 BRPM | OXYGEN SATURATION: 96 %

## 2023-10-19 DIAGNOSIS — R00.2 PALPITATIONS: ICD-10-CM

## 2023-10-19 DIAGNOSIS — I25.10 CAD (CORONARY ARTERY DISEASE): ICD-10-CM

## 2023-10-19 DIAGNOSIS — R94.31 NONSPECIFIC ABNORMAL ELECTROCARDIOGRAM (ECG) (EKG): ICD-10-CM

## 2023-10-19 DIAGNOSIS — R07.9 CHEST PAIN, UNSPECIFIED TYPE: ICD-10-CM

## 2023-10-19 LAB
HCO3 UR-SCNC: 24.7 MMOL/L (ref 24–28)
HCO3 UR-SCNC: 24.8 MMOL/L (ref 24–28)
HCO3 UR-SCNC: 24.9 MMOL/L (ref 24–28)
PCO2 BLDA: 39.3 MMHG (ref 35–45)
PCO2 BLDA: 40.3 MMHG (ref 35–45)
PCO2 BLDA: 41.5 MMHG (ref 35–45)
PH SMN: 7.39 [PH] (ref 7.35–7.45)
PH SMN: 7.4 [PH] (ref 7.35–7.45)
PH SMN: 7.41 [PH] (ref 7.35–7.45)
PO2 BLDA: 39 MMHG (ref 40–60)
PO2 BLDA: 42 MMHG (ref 40–60)
PO2 BLDA: 78 MMHG (ref 80–100)
POC BE: 0 MMOL/L
POC SATURATED O2: 73 % (ref 95–100)
POC SATURATED O2: 76 % (ref 95–100)
POC SATURATED O2: 95 % (ref 95–100)
POC TCO2: 26 MMOL/L (ref 23–27)
POC TCO2: 26 MMOL/L (ref 24–29)
POC TCO2: 26 MMOL/L (ref 24–29)
SAMPLE: ABNORMAL
SAMPLE: ABNORMAL
SAMPLE: NORMAL

## 2023-10-19 PROCEDURE — 99152 MOD SED SAME PHYS/QHP 5/>YRS: CPT | Performed by: INTERNAL MEDICINE

## 2023-10-19 PROCEDURE — 25000003 PHARM REV CODE 250: Performed by: INTERNAL MEDICINE

## 2023-10-19 PROCEDURE — 99152 PR MOD CONSCIOUS SEDATION, SAME PHYS, 5+ YRS, FIRST 15 MIN: ICD-10-PCS | Mod: ,,, | Performed by: INTERNAL MEDICINE

## 2023-10-19 PROCEDURE — C1894 INTRO/SHEATH, NON-LASER: HCPCS | Performed by: INTERNAL MEDICINE

## 2023-10-19 PROCEDURE — 99153 MOD SED SAME PHYS/QHP EA: CPT | Performed by: INTERNAL MEDICINE

## 2023-10-19 PROCEDURE — 63600175 PHARM REV CODE 636 W HCPCS: Performed by: INTERNAL MEDICINE

## 2023-10-19 PROCEDURE — C1751 CATH, INF, PER/CENT/MIDLINE: HCPCS | Performed by: INTERNAL MEDICINE

## 2023-10-19 PROCEDURE — 27201423 OPTIME MED/SURG SUP & DEVICES STERILE SUPPLY: Performed by: INTERNAL MEDICINE

## 2023-10-19 PROCEDURE — 93460 R&L HRT ART/VENTRICLE ANGIO: CPT | Performed by: INTERNAL MEDICINE

## 2023-10-19 PROCEDURE — 25000003 PHARM REV CODE 250

## 2023-10-19 PROCEDURE — C1887 CATHETER, GUIDING: HCPCS | Performed by: INTERNAL MEDICINE

## 2023-10-19 PROCEDURE — 25500020 PHARM REV CODE 255: Performed by: INTERNAL MEDICINE

## 2023-10-19 PROCEDURE — C1769 GUIDE WIRE: HCPCS | Performed by: INTERNAL MEDICINE

## 2023-10-19 PROCEDURE — 93460 R&L HRT ART/VENTRICLE ANGIO: CPT | Mod: 26,,, | Performed by: INTERNAL MEDICINE

## 2023-10-19 PROCEDURE — 99152 MOD SED SAME PHYS/QHP 5/>YRS: CPT | Mod: ,,, | Performed by: INTERNAL MEDICINE

## 2023-10-19 PROCEDURE — 93460 PR CATH PLACE/CORON ANGIO, IMG SUPER/INTERP,R&L HRT CATH, L HRT VENTRIC: ICD-10-PCS | Mod: 26,,, | Performed by: INTERNAL MEDICINE

## 2023-10-19 RX ORDER — DIPHENHYDRAMINE HCL 25 MG
CAPSULE ORAL
Status: COMPLETED
Start: 2023-10-19 | End: 2023-10-19

## 2023-10-19 RX ORDER — ACETAMINOPHEN 325 MG/1
650 TABLET ORAL EVERY 4 HOURS PRN
Status: DISCONTINUED | OUTPATIENT
Start: 2023-10-19 | End: 2023-10-19 | Stop reason: HOSPADM

## 2023-10-19 RX ORDER — DIPHENHYDRAMINE HCL 25 MG
50 CAPSULE ORAL
Status: DISCONTINUED | OUTPATIENT
Start: 2023-10-19 | End: 2023-10-19 | Stop reason: HOSPADM

## 2023-10-19 RX ORDER — MIDAZOLAM HYDROCHLORIDE 1 MG/ML
INJECTION INTRAMUSCULAR; INTRAVENOUS
Status: DISCONTINUED | OUTPATIENT
Start: 2023-10-19 | End: 2023-10-19 | Stop reason: HOSPADM

## 2023-10-19 RX ORDER — HEPARIN SODIUM 10000 [USP'U]/ML
INJECTION, SOLUTION INTRAVENOUS; SUBCUTANEOUS
Status: DISCONTINUED | OUTPATIENT
Start: 2023-10-19 | End: 2023-10-19 | Stop reason: HOSPADM

## 2023-10-19 RX ORDER — LIDOCAINE HYDROCHLORIDE 10 MG/ML
INJECTION, SOLUTION EPIDURAL; INFILTRATION; INTRACAUDAL; PERINEURAL
Status: DISCONTINUED | OUTPATIENT
Start: 2023-10-19 | End: 2023-10-19 | Stop reason: HOSPADM

## 2023-10-19 RX ORDER — SODIUM CHLORIDE 9 MG/ML
INJECTION, SOLUTION INTRAVENOUS CONTINUOUS
Status: DISCONTINUED | OUTPATIENT
Start: 2023-10-19 | End: 2023-10-19 | Stop reason: HOSPADM

## 2023-10-19 RX ORDER — NITROGLYCERIN 5 MG/ML
INJECTION, SOLUTION INTRAVENOUS
Status: DISCONTINUED | OUTPATIENT
Start: 2023-10-19 | End: 2023-10-19 | Stop reason: HOSPADM

## 2023-10-19 RX ORDER — FENTANYL CITRATE 50 UG/ML
INJECTION, SOLUTION INTRAMUSCULAR; INTRAVENOUS
Status: DISCONTINUED | OUTPATIENT
Start: 2023-10-19 | End: 2023-10-19 | Stop reason: HOSPADM

## 2023-10-19 RX ORDER — ONDANSETRON 4 MG/1
8 TABLET, ORALLY DISINTEGRATING ORAL EVERY 8 HOURS PRN
Status: DISCONTINUED | OUTPATIENT
Start: 2023-10-19 | End: 2023-10-19 | Stop reason: HOSPADM

## 2023-10-19 RX ORDER — SODIUM CHLORIDE 9 MG/ML
INJECTION, SOLUTION INTRAVENOUS ONCE
Status: COMPLETED | OUTPATIENT
Start: 2023-10-19 | End: 2023-10-19

## 2023-10-19 RX ADMIN — SODIUM CHLORIDE: 0.9 INJECTION, SOLUTION INTRAVENOUS at 07:10

## 2023-10-19 RX ADMIN — DIPHENHYDRAMINE HYDROCHLORIDE 50 MG: 25 CAPSULE ORAL at 07:10

## 2023-10-19 RX ADMIN — Medication 50 MG: at 07:10

## 2023-10-19 NOTE — H&P
[facility]  Cardiology  History and Physical     Patient Name: Elle Hernandez  MRN: [id number]  Admission Date: 10/19/2023  Code Status: Prior   Attending Provider: Griffin Sue MD   Primary Care Physician: Ezequiel Hernandez MD  Principal Problem:<principal problem not specified>    Patient information was obtained from patient and ER records.     Subjective:     68-year-old female referred for left and right heart catheterization for intermittent chest discomfort and shortness of breath.  Stress test was normal.    Past Medical History:   Diagnosis Date    Allergy     Migraine        Past Surgical History:   Procedure Laterality Date    ESOPHAGOGASTRODUODENOSCOPY N/A 5/12/2023    Procedure: EGD (ESOPHAGOGASTRODUODENOSCOPY);  Surgeon: Cruz Whitney MD;  Location: [facility];  Service: Endoscopy;  Laterality: N/A;    facial biopsy Right 08/15/2021    right forehead biopsy result basal cell carcinoma    TONSILLECTOMY      TUBAL LIGATION         Review of patient's allergies indicates:   Allergen Reactions    Pcn [penicillins] Hives and Nausea Only    Hydrocodone Nausea Only       No current facility-administered medications on file prior to encounter.     Current Outpatient Medications on File Prior to Encounter   Medication Sig    [DISCONTINUED] metoprolol succinate (TOPROL-XL) 25 MG 24 hr tablet Take 1 tablet (25 mg total) by mouth once daily.     Family History       Problem Relation (Age of Onset)    COPD Maternal Grandfather    Cancer Mother (42)    Heart disease Father, Maternal Grandmother, Paternal Grandmother          Tobacco Use    Smoking status: Never     Passive exposure: Yes    Smokeless tobacco: Never   Substance and Sexual Activity    Alcohol use: Yes     Comment: 2 glasses of red wine daily    Drug use: No    Sexual activity: Not on file     Review of Systems   All other systems reviewed and are negative.    Objective:     Vital Signs (Most Recent):  Pulse: 77 (10/19/23  0715)  Resp: 11 (10/19/23 0715)  BP: (!) 155/81 (10/19/23 0715)  SpO2: 100 % (10/19/23 0715) Vital Signs (24h Range):  Pulse:  [77] 77  Resp:  [11] 11  SpO2:  [100 %] 100 %  BP: (155)/(81) 155/81        There is no height or weight on file to calculate BMI.    SpO2: 100 %       No intake or output data in the 24 hours ending 10/19/23 0754    Lines/Drains/Airways       Peripheral Intravenous Line  Duration                  Peripheral IV - Single Lumen 10/19/23 0722 20 G Left Antecubital <1 day                    Physical Exam  Vitals reviewed.   Constitutional:       Appearance: Normal appearance.   HENT:      Mouth/Throat:      Mouth: Mucous membranes are moist.   Eyes:      Extraocular Movements: Extraocular movements intact.      Pupils: Pupils are equal, round, and reactive to light.   Cardiovascular:      Rate and Rhythm: Normal rate and regular rhythm.      Heart sounds: No murmur heard.     No friction rub. No gallop.   Pulmonary:      Effort: Pulmonary effort is normal.      Breath sounds: Normal breath sounds.   Abdominal:      General: Abdomen is flat.      Palpations: Abdomen is soft.   Musculoskeletal:         General: Normal range of motion.   Skin:     General: Skin is warm and dry.   Neurological:      General: No focal deficit present.      Mental Status: She is alert and oriented to person, place, and time.   Psychiatric:         Mood and Affect: Mood normal.         Significant Labs: BMP:   Recent Labs   Lab 10/17/23  1133         K 4.3      CO2 28   BUN 14   CREATININE 1.0   CALCIUM 9.7    and CBC   Recent Labs   Lab 10/17/23  1133   WBC 5.22   HGB 13.4   HCT 38.2          Significant Imaging: Echocardiogram: 2D echo with color flow doppler: No results found for this or any previous visit. and Transthoracic echo (TTE) complete (Cupid Only): No results found for this or any previous visit.  Assessment and Plan:   Intermittent chest pain  Exertional shortness of  breath    Plan:  - discussed with patient regarding risks and benefits of right and left heart catheterization for further evaluation of the above problems.  Patient agreeable, will proceed.      There are no hospital problems to display for this patient.      VTE Risk Mitigation (From admission, onward)      None            Griffin Sue MD  Cardiology   Counts include 234 beds at the [facility]

## 2023-10-19 NOTE — Clinical Note
The catheter was repositioned into the ostium   right coronary artery. An angiography was performed of the right coronary arteries. The angiography was performed via power injection. The injected amount was 6 mL contrast at 3 mL/s.

## 2023-10-19 NOTE — TELEPHONE ENCOUNTER
----- Message from Elma Pat sent at 10/19/2023 12:12 PM CDT -----  Karyn FOSTER needs an order for a Bone Density test. Estefani fax # [phone] phone # [phone] GH

## 2023-10-19 NOTE — Clinical Note
The catheter was repositioned into the ostium   left main. An angiography was performed of the left coronary arteries. The angiography was performed via power injection. The injected amount was 8 mL contrast at 4 mL/s.

## 2023-10-19 NOTE — Clinical Note
The site was marked. The right radial and right brachial was prepped. The site was prepped with ChloraPrep. The site was clipped. The patient was draped.

## 2023-10-25 LAB — BMD RECOMMENDATION EXT: NORMAL

## 2023-10-26 ENCOUNTER — PATIENT MESSAGE (OUTPATIENT)
Dept: FAMILY MEDICINE | Facility: CLINIC | Age: 68
End: 2023-10-26

## 2023-10-26 NOTE — TELEPHONE ENCOUNTER
These labs are essentially normal and ordered by Cardiology who has also reviewed these.  Please let her know that we reviewed angiogram results as well and happy to see that she just had mild-to-moderate disease that did not require any stenting.  Should keep follow-up with Cardiology

## 2023-11-06 ENCOUNTER — PATIENT OUTREACH (OUTPATIENT)
Dept: ADMINISTRATIVE | Facility: HOSPITAL | Age: 68
End: 2023-11-06
Payer: MEDICARE

## 2023-11-07 ENCOUNTER — TELEPHONE (OUTPATIENT)
Dept: FAMILY MEDICINE | Facility: CLINIC | Age: 68
End: 2023-11-07

## 2023-11-07 NOTE — PROGRESS NOTES
Call patient.  DEXA scan shows normal bone density and hips and spine.  Continue calcium plus D vitamins, will repeat again in 2 years

## 2023-11-07 NOTE — TELEPHONE ENCOUNTER
----- Message from Ezequiel Hernandez MD sent at 11/7/2023  8:05 AM CST -----  Call patient.  DEXA scan shows normal bone density and hips and spine.  Continue calcium plus D vitamins, will repeat again in 2 years

## 2023-11-15 ENCOUNTER — TELEPHONE (OUTPATIENT)
Dept: DERMATOLOGY | Facility: CLINIC | Age: 68
End: 2023-11-15
Payer: MEDICARE

## 2023-11-15 NOTE — TELEPHONE ENCOUNTER
Patient scheduled.     ----- Message from Mali Montelongo sent at 11/15/2023  9:44 AM CST -----  Contact: self  Type:  Sooner Appointment Request    Caller is requesting a sooner appointment.  Caller declined first available appointment listed below.  Caller will not accept being placed on the waitlist and is requesting a message be sent to doctor.    Name of Caller:  pt  When is the first available appointment?  N/a  Symptoms:  spot on chest/ichy and sore  Would the patient rather a call back or a response via [facility]? call  Best Call Back Number:  [phone]   Additional Information:  please advise

## 2023-11-28 ENCOUNTER — PATIENT MESSAGE (OUTPATIENT)
Dept: FAMILY MEDICINE | Facility: CLINIC | Age: 68
End: 2023-11-28

## 2023-12-03 ENCOUNTER — PATIENT MESSAGE (OUTPATIENT)
Dept: FAMILY MEDICINE | Facility: CLINIC | Age: 68
End: 2023-12-03

## 2023-12-06 ENCOUNTER — OFFICE VISIT (OUTPATIENT)
Dept: DERMATOLOGY | Facility: CLINIC | Age: 68
End: 2023-12-06
Payer: MEDICARE

## 2023-12-06 VITALS — BODY MASS INDEX: 25.95 KG/M2 | HEIGHT: 64 IN | WEIGHT: 152 LBS

## 2023-12-06 DIAGNOSIS — D48.5 NEOPLASM OF UNCERTAIN BEHAVIOR OF SKIN: Primary | ICD-10-CM

## 2023-12-06 DIAGNOSIS — L30.0 NUMMULAR ECZEMA: ICD-10-CM

## 2023-12-06 DIAGNOSIS — D22.9 MULTIPLE BENIGN NEVI: ICD-10-CM

## 2023-12-06 DIAGNOSIS — L82.1 SEBORRHEIC KERATOSES: ICD-10-CM

## 2023-12-06 DIAGNOSIS — L81.4 SOLAR LENTIGO: ICD-10-CM

## 2023-12-06 DIAGNOSIS — Z85.828 ENCOUNTER FOR FOLLOW-UP SURVEILLANCE OF SKIN CANCER: ICD-10-CM

## 2023-12-06 DIAGNOSIS — Z08 ENCOUNTER FOR FOLLOW-UP SURVEILLANCE OF SKIN CANCER: ICD-10-CM

## 2023-12-06 PROCEDURE — 88341 IMHCHEM/IMCYTCHM EA ADD ANTB: CPT | Performed by: PATHOLOGY

## 2023-12-06 PROCEDURE — 11102 TANGNTL BX SKIN SINGLE LES: CPT | Mod: S$GLB,,, | Performed by: DERMATOLOGY

## 2023-12-06 PROCEDURE — 88305 TISSUE EXAM BY PATHOLOGIST: ICD-10-PCS | Mod: 26,,, | Performed by: PATHOLOGY

## 2023-12-06 PROCEDURE — 11102 PR TANGENTIAL BIOPSY, SKIN, SINGLE LESION: ICD-10-PCS | Mod: S$GLB,,, | Performed by: DERMATOLOGY

## 2023-12-06 PROCEDURE — 99203 PR OFFICE/OUTPT VISIT, NEW, LEVL III, 30-44 MIN: ICD-10-PCS | Mod: 25,S$GLB,, | Performed by: DERMATOLOGY

## 2023-12-06 PROCEDURE — 1101F PT FALLS ASSESS-DOCD LE1/YR: CPT | Mod: CPTII,S$GLB,, | Performed by: DERMATOLOGY

## 2023-12-06 PROCEDURE — 1160F PR REVIEW ALL MEDS BY PRESCRIBER/CLIN PHARMACIST DOCUMENTED: ICD-10-PCS | Mod: CPTII,S$GLB,, | Performed by: DERMATOLOGY

## 2023-12-06 PROCEDURE — 88341 PR IHC OR ICC EACH ADD'L SINGLE ANTIBODY  STAINPR: ICD-10-PCS | Mod: 26,,, | Performed by: PATHOLOGY

## 2023-12-06 PROCEDURE — 1101F PR PT FALLS ASSESS DOC 0-1 FALLS W/OUT INJ PAST YR: ICD-10-PCS | Mod: CPTII,S$GLB,, | Performed by: DERMATOLOGY

## 2023-12-06 PROCEDURE — 88342 IMHCHEM/IMCYTCHM 1ST ANTB: CPT | Performed by: PATHOLOGY

## 2023-12-06 PROCEDURE — 88341 IMHCHEM/IMCYTCHM EA ADD ANTB: CPT | Mod: 26,,, | Performed by: PATHOLOGY

## 2023-12-06 PROCEDURE — 1160F RVW MEDS BY RX/DR IN RCRD: CPT | Mod: CPTII,S$GLB,, | Performed by: DERMATOLOGY

## 2023-12-06 PROCEDURE — 3288F PR FALLS RISK ASSESSMENT DOCUMENTED: ICD-10-PCS | Mod: CPTII,S$GLB,, | Performed by: DERMATOLOGY

## 2023-12-06 PROCEDURE — 88342 IMHCHEM/IMCYTCHM 1ST ANTB: CPT | Mod: 26,,, | Performed by: PATHOLOGY

## 2023-12-06 PROCEDURE — 3288F FALL RISK ASSESSMENT DOCD: CPT | Mod: CPTII,S$GLB,, | Performed by: DERMATOLOGY

## 2023-12-06 PROCEDURE — 99203 OFFICE O/P NEW LOW 30 MIN: CPT | Mod: 25,S$GLB,, | Performed by: DERMATOLOGY

## 2023-12-06 PROCEDURE — 88305 TISSUE EXAM BY PATHOLOGIST: CPT | Performed by: PATHOLOGY

## 2023-12-06 PROCEDURE — 1159F PR MEDICATION LIST DOCUMENTED IN MEDICAL RECORD: ICD-10-PCS | Mod: CPTII,S$GLB,, | Performed by: DERMATOLOGY

## 2023-12-06 PROCEDURE — 3008F PR BODY MASS INDEX (BMI) DOCUMENTED: ICD-10-PCS | Mod: CPTII,S$GLB,, | Performed by: DERMATOLOGY

## 2023-12-06 PROCEDURE — 88305 TISSUE EXAM BY PATHOLOGIST: CPT | Mod: 26,,, | Performed by: PATHOLOGY

## 2023-12-06 PROCEDURE — 88342 CHG IMMUNOCYTOCHEMISTRY: ICD-10-PCS | Mod: 26,,, | Performed by: PATHOLOGY

## 2023-12-06 PROCEDURE — 3008F BODY MASS INDEX DOCD: CPT | Mod: CPTII,S$GLB,, | Performed by: DERMATOLOGY

## 2023-12-06 PROCEDURE — 1159F MED LIST DOCD IN RCRD: CPT | Mod: CPTII,S$GLB,, | Performed by: DERMATOLOGY

## 2023-12-06 RX ORDER — TRIAMCINOLONE ACETONIDE 1 MG/G
CREAM TOPICAL
Qty: 80 G | Refills: 3 | Status: SHIPPED | OUTPATIENT
Start: 2023-12-06

## 2023-12-06 NOTE — PROGRESS NOTES
"  Subjective:      Patient ID:  Elle Hernandez is a 68 y.o. female who presents for   Chief Complaint   Patient presents with    Skin Check     TBSC      New Patient    Patient here today for TBSC  C/O spots to chest was itchy has since resolved.  C/o itchy spot to left upper back, area biopsied decade ago, "inconclusive", Dr Licea, reexcised    Derm Hx:  BCC right forehead- 2020 Dr.Mc Anderson, MOHS with  2020  Denies Fhx of MM    No current outpatient medications on file.        Review of Systems   Constitutional:  Negative for fever, chills and fatigue.   Skin:  Positive for activity-related sunscreen use and wears hat. Negative for daily sunscreen use.   Hematologic/Lymphatic: Bruises/bleeds easily.       Objective:   Physical Exam   Constitutional: She appears well-developed and well-nourished. No distress.   Neurological: She is alert and oriented to person, place, and time. She is not disoriented.   Psychiatric: She has a normal mood and affect.   Skin:   Areas Examined (abnormalities noted in diagram):   Scalp / Hair Palpated and Inspected  Head / Face Inspection Performed  Neck Inspection Performed  Chest / Axilla Inspection Performed  Abdomen Inspection Performed  Genitals / Buttocks / Groin Inspection Performed  Back Inspection Performed  RUE Inspected  LUE Inspection Performed  RLE Inspected  LLE Inspection Performed  Nails and Digits Inspection Performed                     Diagram Legend     Erythematous scaling macule/papule c/w actinic keratosis       Vascular papule c/w angioma      Pigmented verrucoid papule/plaque c/w seborrheic keratosis      Yellow umbilicated papule c/w sebaceous hyperplasia      Irregularly shaped tan macule c/w lentigo     1-2 mm smooth white papules consistent with Milia      Movable subcutaneous cyst with punctum c/w epidermal inclusion cyst      Subcutaneous movable cyst c/w pilar cyst      Firm pink to brown papule c/w dermatofibroma      Pedunculated " fleshy papule(s) c/w skin tag(s)      Evenly pigmented macule c/w junctional nevus     Mildly variegated pigmented, slightly irregular-bordered macule c/w mildly atypical nevus      Flesh colored to evenly pigmented papule c/w intradermal nevus       Pink pearly papule/plaque c/w basal cell carcinoma      Erythematous hyperkeratotic cursted plaque c/w SCC      Surgical scar with no sign of skin cancer recurrence      Open and closed comedones      Inflammatory papules and pustules      Verrucoid papule consistent consistent with wart     Erythematous eczematous patches and plaques     Dystrophic onycholytic nail with subungual debris c/w onychomycosis     Umbilicated papule    Erythematous-base heme-crusted tan verrucoid plaque consistent with inflamed seborrheic keratosis     Erythematous Silvery Scaling Plaque c/w Psoriasis     See annotation      Assessment / Plan:      Pathology Orders:       Normal Orders This Visit    Specimen to Pathology, Dermatology     Questions:    Procedure Type: Dermatology and skin neoplasms    Number of Specimens: 1    ------------------------: -------------------------    Spec 1 Procedure: Biopsy    Spec 1 Clinical Impression: Non healing erythematous patch, bleeds easily, sup BCC vs other    Spec 1 Source: left upper back    Release to patient:           Neoplasm of uncertain behavior of skin  -     Specimen to Pathology, Dermatology  Shave biopsy procedure note:    Shave biopsy performed after verbal consent including risk of infection, scar, recurrence, need for additional treatment of site. Area prepped with alcohol, anesthetized with approximately 1.0cc of 1% lidocaine with epinephrine. Lesional tissue shaved with razor blade. Hemostasis achieved with application of aluminum chloride followed by hyfrecation. No complications. Dressing applied. Wound care explained.    Multiple benign nevi  Careful dermoscopy evaluation of nevi performed with none identified as needing biopsy  today  Monitor for new mole or moles that are becoming bigger, darker, irritated, or developing irregular borders.     Seborrheic keratoses  These are benign inherited growths without a malignant potential. Reassurance given to patient. No treatment is necessary.     Solar lentigo  This is a benign hyperpigmented sun induced lesion. Daily sun protection will reduce the number of new lesions. Treatment of these benign lesions are considered cosmetic.    Nummular eczema  -     triamcinolone acetonide 0.1% (KENALOG) 0.1 % cream; AAA lower legs daily to bid PRN rash  Dispense: 80 g; Refill: 3  Mild, lower legs  Start cerave cream at least daily after shower    Encounter for follow-up surveillance of skin cancer  Area of previous BCC (r upper forehead) examined. Site well healed with no signs of recurrence.    Total body skin examination performed today including at least 12 points as noted in physical examination. No lesions suspicious for malignancy noted.    Patient instructed in importance in daily broad spectrum sun protection of at least spf 30. Mineral sunscreen ingredients preferred (Zinc +/- Titanium) and can be found OTC.   Recommend Elta MD for daily use on face and neck.  Patient encouraged to wear hat for all outdoor exposure.   Also discussed sun avoidance and use of protective clothing.           Follow up in about 1 year (around 12/6/2024), or if symptoms worsen or fail to improve.

## 2023-12-06 NOTE — PATIENT INSTRUCTIONS
Shave Biopsy Wound Care    Your doctor has performed a shave biopsy today.  A band aid and vaseline ointment has been placed over the site.  This should remain in place for 24 hours.  It is recommended that you keep the area dry for the first 24 hours.  After 24 hours, you may remove the band aid and wash the area with warm soap and water and apply Vaseline jelly.  Many patients prefer to use Neosporin or Bacitracin ointment.  This is acceptable; however, know that you can develop an allergy to this medication even if you have used it safely for years.  It is important to keep the area moist.  Letting it dry out and get air slows healing time, and will worsen the scar.  Band aid is optional after first 24 hours.      If you notice increasing redness, tenderness, pain, or yellow drainage at the biopsy site, please notify your doctor.  These are signs of an infection.    If your biopsy site is bleeding, apply firm pressure for 15 minutes straight.  Repeat for another 15 minutes, if it is still bleeding.   If the surgical site continues to bleed, then please contact your doctor.       [facility] - DERMATOLOGY  [address], [address]  [place] [address]  Dept: [phone]  Dept Fax: [phone]

## 2023-12-12 LAB
FINAL PATHOLOGIC DIAGNOSIS: NORMAL
GROSS: NORMAL
Lab: NORMAL
MICROSCOPIC EXAM: NORMAL

## 2023-12-14 ENCOUNTER — TELEPHONE (OUTPATIENT)
Dept: DERMATOLOGY | Facility: CLINIC | Age: 68
End: 2023-12-14

## 2023-12-28 ENCOUNTER — PATIENT MESSAGE (OUTPATIENT)
Dept: FAMILY MEDICINE | Facility: CLINIC | Age: 68
End: 2023-12-28

## 2023-12-29 ENCOUNTER — TELEPHONE (OUTPATIENT)
Dept: FAMILY MEDICINE | Facility: CLINIC | Age: 68
End: 2023-12-29

## 2024-01-04 ENCOUNTER — OFFICE VISIT (OUTPATIENT)
Dept: FAMILY MEDICINE | Facility: CLINIC | Age: 69
End: 2024-01-04
Payer: MEDICARE

## 2024-01-04 VITALS
OXYGEN SATURATION: 99 % | HEIGHT: 64 IN | HEART RATE: 88 BPM | BODY MASS INDEX: 26.29 KG/M2 | DIASTOLIC BLOOD PRESSURE: 82 MMHG | WEIGHT: 154 LBS | SYSTOLIC BLOOD PRESSURE: 132 MMHG

## 2024-01-04 DIAGNOSIS — R41.3 OTHER AMNESIA: ICD-10-CM

## 2024-01-04 DIAGNOSIS — R46.89 COGNITIVE AND BEHAVIORAL CHANGES: Primary | ICD-10-CM

## 2024-01-04 DIAGNOSIS — R41.89 COGNITIVE AND BEHAVIORAL CHANGES: Primary | ICD-10-CM

## 2024-01-04 DIAGNOSIS — F40.240 CLAUSTROPHOBIA: ICD-10-CM

## 2024-01-04 PROCEDURE — 3079F DIAST BP 80-89 MM HG: CPT | Mod: CPTII,S$GLB,, | Performed by: PHYSICIAN ASSISTANT

## 2024-01-04 PROCEDURE — 3075F SYST BP GE 130 - 139MM HG: CPT | Mod: CPTII,S$GLB,, | Performed by: PHYSICIAN ASSISTANT

## 2024-01-04 PROCEDURE — 3288F FALL RISK ASSESSMENT DOCD: CPT | Mod: CPTII,S$GLB,, | Performed by: PHYSICIAN ASSISTANT

## 2024-01-04 PROCEDURE — 1159F MED LIST DOCD IN RCRD: CPT | Mod: CPTII,S$GLB,, | Performed by: PHYSICIAN ASSISTANT

## 2024-01-04 PROCEDURE — 3008F BODY MASS INDEX DOCD: CPT | Mod: CPTII,S$GLB,, | Performed by: PHYSICIAN ASSISTANT

## 2024-01-04 PROCEDURE — 1101F PT FALLS ASSESS-DOCD LE1/YR: CPT | Mod: CPTII,S$GLB,, | Performed by: PHYSICIAN ASSISTANT

## 2024-01-04 PROCEDURE — 99214 OFFICE O/P EST MOD 30 MIN: CPT | Mod: S$GLB,,, | Performed by: PHYSICIAN ASSISTANT

## 2024-01-04 RX ORDER — DIAZEPAM 5 MG/1
5 TABLET ORAL EVERY 6 HOURS PRN
Qty: 3 TABLET | Refills: 0 | Status: SHIPPED | OUTPATIENT
Start: 2024-01-04 | End: 2024-02-03

## 2024-01-04 NOTE — PROGRESS NOTES
"  SUBJECTIVE:    Patient ID: Elle Hernandez is a 68 y.o. female.    Chief Complaint: Memory Loss (No bottles, Pt complains she noticed in the last couple months she is not remembering things, she has to write everything down and she got lost the other day while driving, forgets were she puts things and she has mohs surgery in 2020 and now is feeling stabbing pains in the area where she had the surgery she feels nauseous everyday //BA )    This is a 68-year-old female who presents today to discuss concerns of memory loss.  She notices in the last couple months she has not remembering things, finding that she has to write everything down otherwise she forgets very easily.  Alarming symptoms reported of getting lost while driving. This was the other day and she has lived here 44 years. Constantly forgetting where she puts things. New onset vertigo/dizziness at times also. She had BCC of the forehead. MOHS surgery completed 2020 (Dr. Wei). In this location she is now having a "stabbing" sensation in this area. Off and on. More prominent now.. Nausea pretty constant all the time. EGD was completed with GI and only mild acid reflux. She denies aphasia, weakness, ataxia. NO recent falls to note.         Office Visit on 12/06/2023   Component Date Value Ref Range Status    Final Pathologic Diagnosis 12/06/2023    Final                    Value:Skin, left upper back, shave biopsy:  -THIN SEBORRHEIC KERATOSIS, IRRITATED AND INFLAMED    This lesion is benign.      Gross 12/06/2023    Final                    Value:Pathology ID:  [id number]  Patient ID:  [id number]  The specimen is received in formalin labeled &quot;left upper back&quot;.  The specimen is a shave biopsy of tan skin measuring 0.9 x 0.7 cm .  The specimen is bisected,  inked blue at the resection margin and submitted entirely in cassette   [id number]      Microscopic Exam 12/06/2023    Final                    Value:Sections show minimal " acanthosis at the same level as the basal layer of monomorphous keratocytes.  Centrally the epidermis shows reactive/regenerative changes.  Ki-67 proliferation index is mildly increased but overall confined to the basal layer of the   epidermis.  Some areas contain mildly enlarged, pigmented keratocytes at the basal layer.  There is a superficial perivascular and interstitial lympho histiocytic infiltrate with some scattered neutrophils and extravasated red blood cells.  Sparse   melanophages are also noted in the superficial dermis. Mart-1 highlights periodically spaced population of single melanocytes at the dermoepidermal junction.  Immunohistochemical stains were reviewed with adequate positive controls.  Additional H&E   levels were examined.       Disclaimer 12/06/2023 Unless the case is a 'gross only' or additional testing only, the final diagnosis for each specimen is based on a microscopic examination of appropriate tissue sections.   Final   Patient Outreach on 11/06/2023   Component Date Value Ref Range Status    BMD Recommendation External 10/25/2023 No follow-up frequency specified   Final   Admission on 10/19/2023, Discharged on 10/19/2023   Component Date Value Ref Range Status    POC PH 10/19/2023 7.386  7.35 - 7.45 Final    POC PCO2 10/19/2023 41.5  35 - 45 mmHg Final    POC PO2 10/19/2023 42  40 - 60 mmHg Final    POC HCO3 10/19/2023 24.9  24 - 28 mmol/L Final    POC BE 10/19/2023 0  -2 to 2 mmol/L Final    POC SATURATED O2 10/19/2023 76  95 - 100 % Final    POC TCO2 10/19/2023 26  24 - 29 mmol/L Final    Sample 10/19/2023 VENOUS   Final    POC PH 10/19/2023 7.406  7.35 - 7.45 Final    POC PCO2 10/19/2023 39.3  35 - 45 mmHg Final    POC PO2 10/19/2023 78 (L)  80 - 100 mmHg Final    POC HCO3 10/19/2023 24.7  24 - 28 mmol/L Final    POC BE 10/19/2023 0  -2 to 2 mmol/L Final    POC SATURATED O2 10/19/2023 95  95 - 100 % Final    POC TCO2 10/19/2023 26  23 - 27 mmol/L Final    Sample 10/19/2023  ARTERIAL   Final    POC PH 10/19/2023 7.398  7.35 - 7.45 Final    POC PCO2 10/19/2023 40.3  35 - 45 mmHg Final    POC PO2 10/19/2023 39 (L)  40 - 60 mmHg Final    POC HCO3 10/19/2023 24.8  24 - 28 mmol/L Final    POC BE 10/19/2023 0  -2 to 2 mmol/L Final    POC SATURATED O2 10/19/2023 73  95 - 100 % Final    POC TCO2 10/19/2023 26  24 - 29 mmol/L Final    Sample 10/19/2023 VENOUS   Final   Hospital Outpatient Visit on 10/17/2023   Component Date Value Ref Range Status    WBC 10/17/2023 5.22  3.90 - 12.70 K/uL Final    RBC 10/17/2023 4.18  4.00 - 5.40 M/uL Final    Hemoglobin 10/17/2023 13.4  12.0 - 16.0 g/dL Final    Hematocrit 10/17/2023 38.2  37.0 - 48.5 % Final    MCV 10/17/2023 91  82 - 98 fL Final    MCH 10/17/2023 32.1 (H)  27.0 - 31.0 pg Final    MCHC 10/17/2023 35.1  32.0 - 36.0 g/dL Final    RDW 10/17/2023 13.0  11.5 - 14.5 % Final    Platelets 10/17/2023 286  150 - 450 K/uL Final    MPV 10/17/2023 8.2 (L)  9.2 - 12.9 fL Final    Immature Granulocytes 10/17/2023 0.2  0.0 - 0.5 % Final    Gran # (ANC) 10/17/2023 2.9  1.8 - 7.7 K/uL Final    Immature Grans (Abs) 10/17/2023 0.01  0.00 - 0.04 K/uL Final    Lymph # 10/17/2023 1.8  1.0 - 4.8 K/uL Final    Mono # 10/17/2023 0.4  0.3 - 1.0 K/uL Final    Eos # 10/17/2023 0.1  0.0 - 0.5 K/uL Final    Baso # 10/17/2023 0.03  0.00 - 0.20 K/uL Final    nRBC 10/17/2023 0  0 /100 WBC Final    Gran % 10/17/2023 56.3  38.0 - 73.0 % Final    Lymph % 10/17/2023 33.5  18.0 - 48.0 % Final    Mono % 10/17/2023 6.9  4.0 - 15.0 % Final    Eosinophil % 10/17/2023 2.5  0.0 - 8.0 % Final    Basophil % 10/17/2023 0.6  0.0 - 1.9 % Final    Differential Method 10/17/2023 Automated   Final    Sodium 10/17/2023 137  136 - 145 mmol/L Final    Potassium 10/17/2023 4.3  3.5 - 5.1 mmol/L Final    Chloride 10/17/2023 102  95 - 110 mmol/L Final    CO2 10/17/2023 28  23 - 29 mmol/L Final    Glucose 10/17/2023 103  70 - 110 mg/dL Final    BUN 10/17/2023 14  8 - 23 mg/dL Final    Creatinine  10/17/2023 1.0  0.5 - 1.4 mg/dL Final    Calcium 10/17/2023 9.7  8.7 - 10.5 mg/dL Final    Anion Gap 10/17/2023 7 (L)  8 - 16 mmol/L Final    eGFR 10/17/2023 >60.0  >60 mL/min/1.73 m^2 Final       Past Medical History:   Diagnosis Date    Allergy     Basal cell carcinoma     Migraine      Past Surgical History:   Procedure Laterality Date    CATHETERIZATION OF BOTH LEFT AND RIGHT HEART Left 10/19/2023    Procedure: CATHETERIZATION, HEART, BOTH LEFT AND RIGHT;  Surgeon: Griffin Sue MD;  Location: [facility] CATH/EP LAB;  Service: Cardiology;  Laterality: Left;    ESOPHAGOGASTRODUODENOSCOPY N/A 5/12/2023    Procedure: EGD (ESOPHAGOGASTRODUODENOSCOPY);  Surgeon: Cruz Whitney MD;  Location: [facility] ENDO;  Service: Endoscopy;  Laterality: N/A;    facial biopsy Right 08/15/2021    right forehead biopsy result basal cell carcinoma    TONSILLECTOMY      TUBAL LIGATION       Family History   Problem Relation Age of Onset    Cancer Mother 42        breast cancer    Heart disease Father         quadrouple bypass    Heart disease Maternal Grandmother     COPD Maternal Grandfather     Heart disease Paternal Grandmother        Marital Status:   Alcohol History:  reports current alcohol use.  Tobacco History:  reports that she has never smoked. She has been exposed to tobacco smoke. She has never used smokeless tobacco.  Drug History:  reports no history of drug use.    Review of patient's allergies indicates:   Allergen Reactions    Pcn [penicillins] Hives and Nausea Only    Hydrocodone Nausea Only       Current Outpatient Medications:     triamcinolone acetonide 0.1% (KENALOG) 0.1 % cream, AAA lower legs daily to bid PRN rash, Disp: 80 g, Rfl: 3    diazePAM (VALIUM) 5 MG tablet, Take 1 tablet (5 mg total) by mouth every 6 (six) hours as needed for Anxiety (MRI, claustrophobia)., Disp: 3 tablet, Rfl: 0    Review of Systems   Constitutional:  Negative for chills, fatigue and fever.   Respiratory:  Negative for chest  "tightness and shortness of breath.    Neurological:  Positive for dizziness and headaches. Negative for syncope, speech difficulty and weakness.        Memory issues          Objective:      Vitals:    01/04/24 0853   BP: 132/82   Pulse: 88   SpO2: 99%   Weight: 69.9 kg (154 lb)   Height: 5' 4" (1.626 m)     Physical Exam  Constitutional:       General: She is not in acute distress.     Appearance: She is well-developed.   HENT:      Head: Normocephalic and atraumatic.   Eyes:      Conjunctiva/sclera: Conjunctivae normal.      Pupils: Pupils are equal, round, and reactive to light.   Neck:      Thyroid: No thyromegaly.   Cardiovascular:      Rate and Rhythm: Normal rate and regular rhythm.      Heart sounds: Normal heart sounds.   Pulmonary:      Effort: Pulmonary effort is normal.      Breath sounds: Normal breath sounds.   Abdominal:      General: Bowel sounds are normal. There is no distension.      Palpations: Abdomen is soft.      Tenderness: There is no abdominal tenderness.   Musculoskeletal:         General: Normal range of motion.      Cervical back: Normal range of motion and neck supple.   Skin:     General: Skin is warm and dry.      Findings: No erythema.   Neurological:      Mental Status: She is alert and oriented to person, place, and time.      Cranial Nerves: No cranial nerve deficit.           Assessment:       1. Cognitive and behavioral changes    2. Other amnesia    3. Claustrophobia         Plan:       Cognitive and behavioral changes  Comments:  alarming sxs discussed in HPI. needs MRI for further evaluation and referral to neurology asap for full MSE.  Orders:  -     Ambulatory referral/consult to Neurology; Future; Expected date: 01/04/2024    Other amnesia  -     MRI Brain Without Contrast; Future; Expected date: 01/04/2024  -     Ambulatory referral/consult to Neurology; Future; Expected date: 01/04/2024    Claustrophobia  Comments:  prn diazepam to use strictly for MRI    Other orders  - "     diazePAM (VALIUM) 5 MG tablet; Take 1 tablet (5 mg total) by mouth every 6 (six) hours as needed for Anxiety (MRI, claustrophobia).  Dispense: 3 tablet; Refill: 0      Follow up in about 6 weeks (around 2/15/2024).        1/4/2024 Jorge Villeda PA-C

## 2024-01-11 ENCOUNTER — HOSPITAL ENCOUNTER (OUTPATIENT)
Dept: RADIOLOGY | Facility: HOSPITAL | Age: 69
Discharge: HOME | End: 2024-01-11
Attending: PHYSICIAN ASSISTANT
Payer: MEDICARE

## 2024-01-11 DIAGNOSIS — R41.3 OTHER AMNESIA: ICD-10-CM

## 2024-01-11 PROCEDURE — 70551 MRI BRAIN STEM W/O DYE: CPT | Mod: TC,PO

## 2024-01-21 ENCOUNTER — PATIENT MESSAGE (OUTPATIENT)
Dept: FAMILY MEDICINE | Facility: CLINIC | Age: 69
End: 2024-01-21
Payer: MEDICARE

## 2024-01-22 NOTE — TELEPHONE ENCOUNTER
Really want her to really try to increase exercise and focus on diet for the 6 weeks until I see her again and then we can evaluate for weight loss at that time.

## 2024-02-05 ENCOUNTER — PATIENT MESSAGE (OUTPATIENT)
Dept: FAMILY MEDICINE | Facility: CLINIC | Age: 69
End: 2024-02-05
Payer: MEDICARE

## 2024-02-05 ENCOUNTER — TELEPHONE (OUTPATIENT)
Dept: FAMILY MEDICINE | Facility: CLINIC | Age: 69
End: 2024-02-05
Payer: MEDICARE

## 2024-02-05 NOTE — TELEPHONE ENCOUNTER
Hard to say what she exactly has here since she is refusing appt and has not been tested. But, to answer her question, I dont think tamiflu would be appropriate even if she did have the flu since she is on day 4 now and will be feeling better soon if it is a virus. Tamiflu may knock 24 hours off of symptoms if started in the first 1-2 days. Rest, push fluids, otc decongestants recommended. If sxs worsen or persist we could consider abx at that time.

## 2024-02-05 NOTE — TELEPHONE ENCOUNTER
Spoke to patient about earlier message, does not want to come in. Asking for tamiflu to be sent, but has not been tested for flu.  Pt complaint as follows:  late friday afternoon had tickle in throat and a cough by saturday morning had very bad sore throat and then spread to my chest cough got bad and had yellow/greem flem that then turned to a greenish/brown,,,also aches on both sides around rib area and headaches..a little nausea but have not thrown up as not really eating just fluids ... have been taking Nyquil and mostly sleeping but wake up with acidy stomach (better after drinking cold bottle of water) did have blocked nose but after taking nyquil cleared... a little better today but still all symptoms ...thanks

## 2024-02-06 ENCOUNTER — OFFICE VISIT (OUTPATIENT)
Dept: FAMILY MEDICINE | Facility: CLINIC | Age: 69
End: 2024-02-06
Payer: MEDICARE

## 2024-02-06 VITALS
WEIGHT: 152 LBS | BODY MASS INDEX: 25.95 KG/M2 | HEIGHT: 64 IN | HEART RATE: 75 BPM | OXYGEN SATURATION: 98 % | DIASTOLIC BLOOD PRESSURE: 81 MMHG | SYSTOLIC BLOOD PRESSURE: 123 MMHG

## 2024-02-06 DIAGNOSIS — J40 BRONCHITIS: ICD-10-CM

## 2024-02-06 DIAGNOSIS — J10.1 INFLUENZA A: Primary | ICD-10-CM

## 2024-02-06 LAB
CTP QC/QA: YES
CTP QC/QA: YES
POC MOLECULAR INFLUENZA A AGN: POSITIVE
POC MOLECULAR INFLUENZA B AGN: NEGATIVE
SARS-COV-2 RDRP RESP QL NAA+PROBE: NEGATIVE

## 2024-02-06 PROCEDURE — 1159F MED LIST DOCD IN RCRD: CPT | Mod: CPTII,S$GLB,, | Performed by: PHYSICIAN ASSISTANT

## 2024-02-06 PROCEDURE — 3074F SYST BP LT 130 MM HG: CPT | Mod: CPTII,S$GLB,, | Performed by: PHYSICIAN ASSISTANT

## 2024-02-06 PROCEDURE — 99214 OFFICE O/P EST MOD 30 MIN: CPT | Mod: S$GLB,,, | Performed by: PHYSICIAN ASSISTANT

## 2024-02-06 PROCEDURE — 3288F FALL RISK ASSESSMENT DOCD: CPT | Mod: CPTII,S$GLB,, | Performed by: PHYSICIAN ASSISTANT

## 2024-02-06 PROCEDURE — 3079F DIAST BP 80-89 MM HG: CPT | Mod: CPTII,S$GLB,, | Performed by: PHYSICIAN ASSISTANT

## 2024-02-06 PROCEDURE — 3008F BODY MASS INDEX DOCD: CPT | Mod: CPTII,S$GLB,, | Performed by: PHYSICIAN ASSISTANT

## 2024-02-06 PROCEDURE — 87635 SARS-COV-2 COVID-19 AMP PRB: CPT | Mod: QW,S$GLB,, | Performed by: PHYSICIAN ASSISTANT

## 2024-02-06 PROCEDURE — 87502 INFLUENZA DNA AMP PROBE: CPT | Mod: QW,,, | Performed by: PHYSICIAN ASSISTANT

## 2024-02-06 PROCEDURE — 1101F PT FALLS ASSESS-DOCD LE1/YR: CPT | Mod: CPTII,S$GLB,, | Performed by: PHYSICIAN ASSISTANT

## 2024-02-06 RX ORDER — INFLUENZA A VIRUS A/VICTORIA/4897/2022 IVR-238 (H1N1) ANTIGEN (FORMALDEHYDE INACTIVATED), INFLUENZA A VIRUS A/DARWIN/6/2021 IVR-227 (H3N2) ANTIGEN (FORMALDEHYDE INACTIVATED), INFLUENZA B VIRUS B/AUSTRIA/1359417/2021 BVR-26 ANTIGEN (FORMALDEHYDE INACTIVATED), INFLUENZA B VIRUS B/PHUKET/3073/2013 BVR-1B ANTIGEN (FORMALDEHYDE INACTIVATED) 15; 15; 15; 15 UG/.5ML; UG/.5ML; UG/.5ML; UG/.5ML
INJECTION, SUSPENSION INTRAMUSCULAR
COMMUNITY
Start: 2023-10-27

## 2024-02-06 RX ORDER — CODEINE PHOSPHATE AND GUAIFENESIN 10; 100 MG/5ML; MG/5ML
5 SOLUTION ORAL 3 TIMES DAILY PRN
Qty: 118 ML | Refills: 0 | Status: SHIPPED | OUTPATIENT
Start: 2024-02-06 | End: 2024-02-16

## 2024-02-06 RX ORDER — DICYCLOMINE HYDROCHLORIDE 20 MG/1
20 TABLET ORAL 4 TIMES DAILY
COMMUNITY
Start: 2024-01-15

## 2024-02-06 RX ORDER — DOXYCYCLINE 100 MG/1
100 CAPSULE ORAL 2 TIMES DAILY
Qty: 20 CAPSULE | Refills: 0 | Status: SHIPPED | OUTPATIENT
Start: 2024-02-06 | End: 2024-02-16

## 2024-02-06 NOTE — PROGRESS NOTES
"  SUBJECTIVE:    Patient ID: Elle Hernandez is a 68 y.o. female.    Chief Complaint: Follow-up (Pt states she's been feeling headaches, sore throat, coughing ,bodyaches and congested x 4 days/ pt have itchy spreading rash on upper body/ been taking Nyquil and vicks vapor rub )    This is a 67 yo female here for sick visit. She states that on Friday she started with a "tickle" in her throat that progressed to a sore throat. She reports sneezing, and productive cough with green/brown sputum. Additional symptoms include occasional headaches with body aches, especially ribs. She reports no fevers, chills or sweats. Has been managing symptoms with Nyquil and soup.    Follow-up  Associated symptoms include arthralgias, congestion and coughing. Pertinent negatives include no abdominal pain, chest pain, chills, fatigue or fever.       Office Visit on 12/06/2023   Component Date Value Ref Range Status    Final Pathologic Diagnosis 12/06/2023    Final                    Value:Skin, left upper back, shave biopsy:  -THIN SEBORRHEIC KERATOSIS, IRRITATED AND INFLAMED    This lesion is benign.      Gross 12/06/2023    Final                    Value:Pathology ID:  [id number]  Patient ID:  [id number]  The specimen is received in formalin labeled &quot;left upper back&quot;.  The specimen is a shave biopsy of tan skin measuring 0.9 x 0.7 cm .  The specimen is bisected,  inked blue at the resection margin and submitted entirely in cassette   [id number] Kingsley      Microscopic Exam 12/06/2023    Final                    Value:Sections show minimal acanthosis at the same level as the basal layer of monomorphous keratocytes.  Centrally the epidermis shows reactive/regenerative changes.  Ki-67 proliferation index is mildly increased but overall confined to the basal layer of the   epidermis.  Some areas contain mildly enlarged, pigmented keratocytes at the basal layer.  There is a superficial perivascular and interstitial " lympho histiocytic infiltrate with some scattered neutrophils and extravasated red blood cells.  Sparse   melanophages are also noted in the superficial dermis. Mart-1 highlights periodically spaced population of single melanocytes at the dermoepidermal junction.  Immunohistochemical stains were reviewed with adequate positive controls.  Additional H&E   levels were examined.       Disclaimer 12/06/2023 Unless the case is a 'gross only' or additional testing only, the final diagnosis for each specimen is based on a microscopic examination of appropriate tissue sections.   Final   Patient Outreach on 11/06/2023   Component Date Value Ref Range Status    BMD Recommendation External 10/25/2023 No follow-up frequency specified   Final   Admission on 10/19/2023, Discharged on 10/19/2023   Component Date Value Ref Range Status    POC PH 10/19/2023 7.386  7.35 - 7.45 Final    POC PCO2 10/19/2023 41.5  35 - 45 mmHg Final    POC PO2 10/19/2023 42  40 - 60 mmHg Final    POC HCO3 10/19/2023 24.9  24 - 28 mmol/L Final    POC BE 10/19/2023 0  -2 to 2 mmol/L Final    POC SATURATED O2 10/19/2023 76  95 - 100 % Final    POC TCO2 10/19/2023 26  24 - 29 mmol/L Final    Sample 10/19/2023 VENOUS   Final    POC PH 10/19/2023 7.406  7.35 - 7.45 Final    POC PCO2 10/19/2023 39.3  35 - 45 mmHg Final    POC PO2 10/19/2023 78 (L)  80 - 100 mmHg Final    POC HCO3 10/19/2023 24.7  24 - 28 mmol/L Final    POC BE 10/19/2023 0  -2 to 2 mmol/L Final    POC SATURATED O2 10/19/2023 95  95 - 100 % Final    POC TCO2 10/19/2023 26  23 - 27 mmol/L Final    Sample 10/19/2023 ARTERIAL   Final    POC PH 10/19/2023 7.398  7.35 - 7.45 Final    POC PCO2 10/19/2023 40.3  35 - 45 mmHg Final    POC PO2 10/19/2023 39 (L)  40 - 60 mmHg Final    POC HCO3 10/19/2023 24.8  24 - 28 mmol/L Final    POC BE 10/19/2023 0  -2 to 2 mmol/L Final    POC SATURATED O2 10/19/2023 73  95 - 100 % Final    POC TCO2 10/19/2023 26  24 - 29 mmol/L Final    Sample 10/19/2023 VENOUS    Final   Hospital Outpatient Visit on 10/17/2023   Component Date Value Ref Range Status    WBC 10/17/2023 5.22  3.90 - 12.70 K/uL Final    RBC 10/17/2023 4.18  4.00 - 5.40 M/uL Final    Hemoglobin 10/17/2023 13.4  12.0 - 16.0 g/dL Final    Hematocrit 10/17/2023 38.2  37.0 - 48.5 % Final    MCV 10/17/2023 91  82 - 98 fL Final    MCH 10/17/2023 32.1 (H)  27.0 - 31.0 pg Final    MCHC 10/17/2023 35.1  32.0 - 36.0 g/dL Final    RDW 10/17/2023 13.0  11.5 - 14.5 % Final    Platelets 10/17/2023 286  150 - 450 K/uL Final    MPV 10/17/2023 8.2 (L)  9.2 - 12.9 fL Final    Immature Granulocytes 10/17/2023 0.2  0.0 - 0.5 % Final    Gran # (ANC) 10/17/2023 2.9  1.8 - 7.7 K/uL Final    Immature Grans (Abs) 10/17/2023 0.01  0.00 - 0.04 K/uL Final    Lymph # 10/17/2023 1.8  1.0 - 4.8 K/uL Final    Mono # 10/17/2023 0.4  0.3 - 1.0 K/uL Final    Eos # 10/17/2023 0.1  0.0 - 0.5 K/uL Final    Baso # 10/17/2023 0.03  0.00 - 0.20 K/uL Final    nRBC 10/17/2023 0  0 /100 WBC Final    Gran % 10/17/2023 56.3  38.0 - 73.0 % Final    Lymph % 10/17/2023 33.5  18.0 - 48.0 % Final    Mono % 10/17/2023 6.9  4.0 - 15.0 % Final    Eosinophil % 10/17/2023 2.5  0.0 - 8.0 % Final    Basophil % 10/17/2023 0.6  0.0 - 1.9 % Final    Differential Method 10/17/2023 Automated   Final    Sodium 10/17/2023 137  136 - 145 mmol/L Final    Potassium 10/17/2023 4.3  3.5 - 5.1 mmol/L Final    Chloride 10/17/2023 102  95 - 110 mmol/L Final    CO2 10/17/2023 28  23 - 29 mmol/L Final    Glucose 10/17/2023 103  70 - 110 mg/dL Final    BUN 10/17/2023 14  8 - 23 mg/dL Final    Creatinine 10/17/2023 1.0  0.5 - 1.4 mg/dL Final    Calcium 10/17/2023 9.7  8.7 - 10.5 mg/dL Final    Anion Gap 10/17/2023 7 (L)  8 - 16 mmol/L Final    eGFR 10/17/2023 >60.0  >60 mL/min/1.73 m^2 Final       Past Medical History:   Diagnosis Date    Allergy     Basal cell carcinoma     Migraine      Past Surgical History:   Procedure Laterality Date    CATHETERIZATION OF BOTH LEFT AND RIGHT HEART  Left 10/19/2023    Procedure: CATHETERIZATION, HEART, BOTH LEFT AND RIGHT;  Surgeon: Griffin Sue MD;  Location: [facility] CATH/EP LAB;  Service: Cardiology;  Laterality: Left;    ESOPHAGOGASTRODUODENOSCOPY N/A 5/12/2023    Procedure: EGD (ESOPHAGOGASTRODUODENOSCOPY);  Surgeon: Cruz Whitney MD;  Location: [facility] ENDO;  Service: Endoscopy;  Laterality: N/A;    facial biopsy Right 08/15/2021    right forehead biopsy result basal cell carcinoma    TONSILLECTOMY      TUBAL LIGATION       Family History   Problem Relation Age of Onset    Cancer Mother 42        breast cancer    Heart disease Father         quadrouple bypass    Heart disease Maternal Grandmother     COPD Maternal Grandfather     Heart disease Paternal Grandmother        Marital Status:   Alcohol History:  reports current alcohol use.  Tobacco History:  reports that she has never smoked. She has been exposed to tobacco smoke. She has never used smokeless tobacco.  Drug History:  reports no history of drug use.    Review of patient's allergies indicates:   Allergen Reactions    Pcn [penicillins] Hives and Nausea Only    Hydrocodone Nausea Only       Current Outpatient Medications:     FLUAD QUAD 2023-24,65Y UP,,PF, 60 mcg (15 mcg x 4)/0.5 mL Syrg, , Disp: , Rfl:     diazePAM (VALIUM) 5 MG tablet, Take 1 tablet (5 mg total) by mouth every 6 (six) hours as needed for Anxiety (MRI, claustrophobia). (Patient not taking: Reported on 2/6/2024), Disp: 3 tablet, Rfl: 0    dicyclomine (BENTYL) 20 mg tablet, Take 20 mg by mouth 4 (four) times daily., Disp: , Rfl:     doxycycline (MONODOX) 100 MG capsule, Take 1 capsule (100 mg total) by mouth 2 (two) times daily. for 10 days, Disp: 20 capsule, Rfl: 0    guaiFENesin-codeine 100-10 mg/5 ml (TUSSI-ORGANIDIN NR)  mg/5 mL syrup, Take 5 mLs by mouth 3 (three) times daily as needed for Cough., Disp: 118 mL, Rfl: 0    triamcinolone acetonide 0.1% (KENALOG) 0.1 % cream, AAA lower legs daily to bid PRN rash  "(Patient not taking: Reported on 2/6/2024), Disp: 80 g, Rfl: 3    Review of Systems   Constitutional:  Positive for activity change. Negative for chills, fatigue and fever.   HENT:  Positive for congestion, ear pain and postnasal drip.    Respiratory:  Positive for cough and chest tightness. Negative for shortness of breath and wheezing.    Cardiovascular:  Negative for chest pain and palpitations.   Gastrointestinal:  Negative for abdominal distention, abdominal pain, constipation and diarrhea.   Genitourinary:  Negative for difficulty urinating and dysuria.   Musculoskeletal:  Positive for arthralgias and back pain.          Objective:      Vitals:    02/06/24 1042   BP: 123/81   Pulse: 75   SpO2: 98%   Weight: 68.9 kg (152 lb)   Height: 5' 4" (1.626 m)     Physical Exam  Constitutional:       Appearance: Normal appearance.   HENT:      Head: Normocephalic and atraumatic.   Cardiovascular:      Rate and Rhythm: Normal rate and regular rhythm.      Pulses: Normal pulses.   Pulmonary:      Effort: Pulmonary effort is normal.      Breath sounds: Normal breath sounds. No wheezing.   Abdominal:      General: Abdomen is flat.      Palpations: Abdomen is soft.           Assessment:       1. Influenza A    2. Bronchitis         Plan:       Influenza A  Comments:  Flu a positive. Day 5. do not feel that she will benefit from tamiflu given the length of symptoms. more concerned about bronchitis/pna setting in. start doxy.    Bronchitis  Comments:  discussed cheratussin. push fluids and rest. should sxs worsen or persist will need to call me.  Orders:  -     POCT COVID-19 Rapid Screening  -     POCT Influenza A/B Molecular  -     doxycycline (MONODOX) 100 MG capsule; Take 1 capsule (100 mg total) by mouth 2 (two) times daily. for 10 days  Dispense: 20 capsule; Refill: 0  -     guaiFENesin-codeine 100-10 mg/5 ml (TUSSI-ORGANIDIN NR)  mg/5 mL syrup; Take 5 mLs by mouth 3 (three) times daily as needed for Cough.  " Dispense: 118 mL; Refill: 0      Follow up if symptoms worsen or fail to improve.        2/6/2024 Jorge Villeda PA-C

## 2024-02-07 ENCOUNTER — PATIENT MESSAGE (OUTPATIENT)
Dept: DERMATOLOGY | Facility: CLINIC | Age: 69
End: 2024-02-07
Payer: MEDICARE

## 2024-02-08 ENCOUNTER — PATIENT MESSAGE (OUTPATIENT)
Dept: FAMILY MEDICINE | Facility: CLINIC | Age: 69
End: 2024-02-08
Payer: MEDICARE

## 2024-02-08 DIAGNOSIS — J40 BRONCHITIS: Primary | ICD-10-CM

## 2024-02-08 RX ORDER — AZITHROMYCIN 250 MG/1
TABLET, FILM COATED ORAL
Qty: 6 TABLET | Refills: 0 | Status: SHIPPED | OUTPATIENT
Start: 2024-02-08 | End: 2024-02-13

## 2024-02-08 NOTE — TELEPHONE ENCOUNTER
Stop the doxycycline prescription now.  We could try her on a Z-James instead.  If she is having hives from the original antibiotic would recommend cetirizine or other antihistamine twice daily.  Let us note the doxycycline as an allergy for her in the future all she has listed as penicillin

## 2024-02-08 NOTE — TELEPHONE ENCOUNTER
----- Message from Mali Betancourt sent at 2/8/2024  9:20 AM CST -----  Pt is having an allergic to doxycycline, hives, headache, rash, itching. She is allergic to penicillin   [phone]

## 2024-02-28 ENCOUNTER — OFFICE VISIT (OUTPATIENT)
Dept: URGENT CARE | Facility: CLINIC | Age: 69
End: 2024-02-28
Payer: MEDICARE

## 2024-02-28 VITALS
DIASTOLIC BLOOD PRESSURE: 89 MMHG | SYSTOLIC BLOOD PRESSURE: 134 MMHG | BODY MASS INDEX: 25.61 KG/M2 | HEART RATE: 79 BPM | WEIGHT: 150 LBS | RESPIRATION RATE: 18 BRPM | OXYGEN SATURATION: 98 % | HEIGHT: 64 IN | TEMPERATURE: 99 F

## 2024-02-28 DIAGNOSIS — S81.801A OPEN WOUND OF RIGHT LOWER LEG, INITIAL ENCOUNTER: ICD-10-CM
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
## 2024-02-28 DIAGNOSIS — S81.851A OPEN BITE, RIGHT LOWER LEG, INITIAL ENCOUNTER: ICD-10-CM

## 2024-02-28 DIAGNOSIS — S80.861A INSECT BITE OF RIGHT LOWER LEG, INITIAL ENCOUNTER: Primary | ICD-10-CM

## 2024-02-28 PROCEDURE — 1159F MED LIST DOCD IN RCRD: CPT | Mod: CPTII,S$GLB,, | Performed by: NURSE PRACTITIONER

## 2024-02-28 PROCEDURE — 3008F BODY MASS INDEX DOCD: CPT | Mod: CPTII,S$GLB,, | Performed by: NURSE PRACTITIONER

## 2024-02-28 PROCEDURE — 3079F DIAST BP 80-89 MM HG: CPT | Mod: CPTII,S$GLB,, | Performed by: NURSE PRACTITIONER

## 2024-02-28 PROCEDURE — 1160F RVW MEDS BY RX/DR IN RCRD: CPT | Mod: CPTII,S$GLB,, | Performed by: NURSE PRACTITIONER

## 2024-02-28 PROCEDURE — 3075F SYST BP GE 130 - 139MM HG: CPT | Mod: CPTII,S$GLB,, | Performed by: NURSE PRACTITIONER

## 2024-02-28 PROCEDURE — 90471 IMMUNIZATION ADMIN: CPT | Mod: S$GLB,,, | Performed by: NURSE PRACTITIONER

## 2024-02-28 PROCEDURE — 90715 TDAP VACCINE 7 YRS/> IM: CPT | Mod: S$GLB,,, | Performed by: NURSE PRACTITIONER

## 2024-02-28 PROCEDURE — 99203 OFFICE O/P NEW LOW 30 MIN: CPT | Mod: 25,S$GLB,, | Performed by: NURSE PRACTITIONER

## 2024-02-28 RX ORDER — MUPIROCIN 20 MG/G
OINTMENT TOPICAL 3 TIMES DAILY
Qty: 22 G | Refills: 0 | Status: SHIPPED | OUTPATIENT
Start: 2024-02-28 | End: 2024-03-06

## 2024-02-29 NOTE — PATIENT INSTRUCTIONS
Increase clear fluid intake  Keep wound clean and covered until healed  Wash wound with clean, soapy water and change dressing daily  Apply mupirocin ointment 2-3 x daily and with dressing changes  Follow up with PCP and wound care referral.  Go directly to the er for any worsening, or emergent concerns  Return to clinic for new concerns.

## 2024-02-29 NOTE — PROGRESS NOTES
"Subjective:      Patient ID: Elle Hernandez is a 68 y.o. female.    Vitals:  height is 5' 4" (1.626 m) and weight is 68 kg (150 lb). Her oral temperature is 98.5 °F (36.9 °C). Her blood pressure is 134/89 and her pulse is 79. Her respiration is 18 and oxygen saturation is 98%.     Chief Complaint: Insect Bite    68-year-old female seen today for blood-filled vesicle to right lower leg.  She states that she was cleaning a garage yesterday and when she awoke this morning she noticed the lesion on her leg.  She states it has gotten larger through the day.  Areas tender to palpation.  She denies any fever or body aches.  She denies seeing the insect that better    Insect Bite  Pertinent negatives include no abdominal pain, chest pain, chills, fever, nausea or vomiting.     Constitution: Negative for chills and fever.   Cardiovascular:  Negative for chest pain, palpitations and sob on exertion.   Respiratory:  Negative for shortness of breath.    Gastrointestinal:  Negative for abdominal pain, nausea, vomiting and diarrhea.   Musculoskeletal:  Positive for pain.   Skin:  Positive for lesion and erythema.   Neurological:  Negative for dizziness, light-headedness, passing out, disorientation and altered mental status.   Psychiatric/Behavioral:  Negative for altered mental status, disorientation and confusion.       Objective:     Physical Exam   Constitutional: She is oriented to person, place, and time. She appears well-developed. She is cooperative.  Non-toxic appearance. She does not appear ill. No distress.   HENT:   Head: Normocephalic and atraumatic.   Ears:   Right Ear: Hearing and external ear normal.   Left Ear: Hearing and external ear normal.   Nose: Nose normal. No mucosal edema, rhinorrhea, nasal deformity or congestion. No epistaxis. Right sinus exhibits no maxillary sinus tenderness and no frontal sinus tenderness. Left sinus exhibits no maxillary sinus tenderness and no frontal sinus tenderness. "   Mouth/Throat: Uvula is midline, oropharynx is clear and moist and mucous membranes are normal. Mucous membranes are moist. No trismus in the jaw. Normal dentition. No uvula swelling. No oropharyngeal exudate, posterior oropharyngeal edema or posterior oropharyngeal erythema.   Eyes: Conjunctivae and lids are normal. No scleral icterus.   Neck: Trachea normal and phonation normal. Neck supple. No edema present. No erythema present. No neck rigidity present.   Cardiovascular: Normal rate, regular rhythm, normal heart sounds and normal pulses.   Pulmonary/Chest: Effort normal and breath sounds normal. No stridor. No respiratory distress. She has no decreased breath sounds. She has no rhonchi.   Abdominal: Normal appearance.   Musculoskeletal: Normal range of motion.         General: No deformity. Normal range of motion.   Neurological: no focal deficit. She is alert and oriented to person, place, and time. She exhibits normal muscle tone. Coordination normal.   Skin: Skin is warm, dry, intact, not diaphoretic and not pale. Capillary refill takes 2 to 3 seconds. erythema        Psychiatric: Her speech is normal and behavior is normal. Judgment and thought content normal.   Nursing note and vitals reviewed.      Assessment:     1. Insect bite of right lower leg, initial encounter    2. Open wound of right lower leg, initial encounter    3. Open bite, right lower leg, initial encounter        Plan:       Insect bite of right lower leg, initial encounter  -     mupirocin (BACTROBAN) 2 % ointment; Apply topically 3 (three) times daily. for 7 days  Dispense: 22 g; Refill: 0  -     Tdap Vaccine    Open wound of right lower leg, initial encounter  -     Tdap Vaccine    Open bite, right lower leg, initial encounter  -     Tdap Vaccine        The physical exam findings were discussed with the patient and all questions answered.  Area cleaned with alcohol prep and lanced with 18 gauge needle.  Vesicle de roofed and drained of  blood to reveal clean wound bed.  Wound cleaned and dressed with mupirocin ointment, gauze and Coban dressing  We discussed symptom monitoring, wound care methods, medication use, and follow up orders.  She verbalized understanding and agreement with the plan of care.

## 2024-06-10 DIAGNOSIS — Z12.31 ENCOUNTER FOR SCREENING MAMMOGRAM FOR MALIGNANT NEOPLASM OF BREAST: Primary | ICD-10-CM

## 2024-06-19 ENCOUNTER — HOSPITAL ENCOUNTER (OUTPATIENT)
Dept: RADIOLOGY | Facility: HOSPITAL | Age: 69
Discharge: HOME | End: 2024-06-19
Attending: SPECIALIST
Payer: MEDICARE

## 2024-06-19 DIAGNOSIS — Z12.31 ENCOUNTER FOR SCREENING MAMMOGRAM FOR MALIGNANT NEOPLASM OF BREAST: ICD-10-CM

## 2024-06-19 PROCEDURE — 77063 BREAST TOMOSYNTHESIS BI: CPT | Mod: 26,,, | Performed by: RADIOLOGY

## 2024-06-19 PROCEDURE — 77067 SCR MAMMO BI INCL CAD: CPT | Mod: TC,PO

## 2024-06-19 PROCEDURE — 77067 SCR MAMMO BI INCL CAD: CPT | Mod: 26,,, | Performed by: RADIOLOGY

## 2024-07-09 ENCOUNTER — PATIENT MESSAGE (OUTPATIENT)
Dept: FAMILY MEDICINE | Facility: CLINIC | Age: 69
End: 2024-07-09
Payer: MEDICARE

## 2024-07-09 DIAGNOSIS — R41.89 COGNITIVE AND BEHAVIORAL CHANGES: Primary | ICD-10-CM

## 2024-07-09 DIAGNOSIS — R46.89 COGNITIVE AND BEHAVIORAL CHANGES: Primary | ICD-10-CM

## 2024-08-12 ENCOUNTER — TELEPHONE (OUTPATIENT)
Dept: FAMILY MEDICINE | Facility: CLINIC | Age: 69
End: 2024-08-12
Payer: MEDICARE

## 2024-08-12 NOTE — TELEPHONE ENCOUNTER
"Patient scheduled herself on the portal with Dr. Hernandez for "memory loss" Looks like she has already seen caitlin for this multiple times, an MRI was ordered and normal. She has been told to f/u with neurology. I called to find out if she was ever able to get scheduled. Patient states she wasn't aware she needed to. I let her know we sent her a portal message with the neurologist information. She agrees to call them and get scheduled. Will call us back after to get scheduled here for a regular f/u.   "

## 2024-10-11 ENCOUNTER — TELEPHONE (OUTPATIENT)
Dept: FAMILY MEDICINE | Facility: CLINIC | Age: 69
End: 2024-10-11
Payer: MEDICARE

## 2024-10-11 NOTE — TELEPHONE ENCOUNTER
Maybe could try otc cortisone 10 and apply with moisturizing lotion. If sxs not improved will need OV to evaluate. Hard to say without seeing in person   No

## 2024-10-11 NOTE — TELEPHONE ENCOUNTER
----- Message from Erika sent at 10/11/2024  8:34 AM CDT -----  Pt would like an appointment soon due to a scrap on her back for 4 days and has not healed. Seems like scrap is growing, itchy, painful to pt. And would like to know at the same time if she is able to receive her flu shot?  [phone]

## 2024-10-11 NOTE — TELEPHONE ENCOUNTER
Has a spot on buttock, possibly insect bite or irritation from chlorine being in hot tub.   Getting more painful and itchy. Tried gold bond, neosporin. Said she has had something like this before but smaller.   Told her we could see about cream rx today and then I booked her for routine follow up and flu shot in couple weeks

## 2024-10-28 ENCOUNTER — OFFICE VISIT (OUTPATIENT)
Dept: FAMILY MEDICINE | Facility: CLINIC | Age: 69
End: 2024-10-28
Payer: MEDICARE

## 2024-10-28 VITALS
HEIGHT: 64 IN | HEART RATE: 79 BPM | BODY MASS INDEX: 25.75 KG/M2 | DIASTOLIC BLOOD PRESSURE: 88 MMHG | SYSTOLIC BLOOD PRESSURE: 130 MMHG | OXYGEN SATURATION: 100 % | WEIGHT: 150.81 LBS

## 2024-10-28 DIAGNOSIS — Z79.899 ENCOUNTER FOR LONG-TERM (CURRENT) USE OF MEDICATIONS: ICD-10-CM

## 2024-10-28 DIAGNOSIS — Z23 NEED FOR INFLUENZA VACCINATION: ICD-10-CM

## 2024-10-28 DIAGNOSIS — K21.9 GASTROESOPHAGEAL REFLUX DISEASE WITHOUT ESOPHAGITIS: ICD-10-CM

## 2024-10-28 DIAGNOSIS — E78.2 MIXED HYPERLIPIDEMIA: ICD-10-CM

## 2024-10-28 DIAGNOSIS — G43.109 COMPLICATED MIGRAINE: ICD-10-CM

## 2024-10-28 DIAGNOSIS — F32.A DEPRESSION, UNSPECIFIED DEPRESSION TYPE: Primary | ICD-10-CM

## 2024-10-28 PROCEDURE — 3079F DIAST BP 80-89 MM HG: CPT | Mod: CPTII,S$GLB,, | Performed by: PHYSICIAN ASSISTANT

## 2024-10-28 PROCEDURE — 1126F AMNT PAIN NOTED NONE PRSNT: CPT | Mod: CPTII,S$GLB,, | Performed by: PHYSICIAN ASSISTANT

## 2024-10-28 PROCEDURE — 3288F FALL RISK ASSESSMENT DOCD: CPT | Mod: CPTII,S$GLB,, | Performed by: PHYSICIAN ASSISTANT

## 2024-10-28 PROCEDURE — 90653 IIV ADJUVANT VACCINE IM: CPT | Mod: S$GLB,,, | Performed by: PHYSICIAN ASSISTANT

## 2024-10-28 PROCEDURE — 1101F PT FALLS ASSESS-DOCD LE1/YR: CPT | Mod: CPTII,S$GLB,, | Performed by: PHYSICIAN ASSISTANT

## 2024-10-28 PROCEDURE — 1159F MED LIST DOCD IN RCRD: CPT | Mod: CPTII,S$GLB,, | Performed by: PHYSICIAN ASSISTANT

## 2024-10-28 PROCEDURE — 3008F BODY MASS INDEX DOCD: CPT | Mod: CPTII,S$GLB,, | Performed by: PHYSICIAN ASSISTANT

## 2024-10-28 PROCEDURE — 99214 OFFICE O/P EST MOD 30 MIN: CPT | Mod: 25,S$GLB,, | Performed by: PHYSICIAN ASSISTANT

## 2024-10-28 PROCEDURE — G0008 ADMIN INFLUENZA VIRUS VAC: HCPCS | Mod: S$GLB,,, | Performed by: PHYSICIAN ASSISTANT

## 2024-10-28 PROCEDURE — 3075F SYST BP GE 130 - 139MM HG: CPT | Mod: CPTII,S$GLB,, | Performed by: PHYSICIAN ASSISTANT

## 2024-10-29 ENCOUNTER — PATIENT MESSAGE (OUTPATIENT)
Dept: FAMILY MEDICINE | Facility: CLINIC | Age: 69
End: 2024-10-29
Payer: MEDICARE

## 2024-10-29 RX ORDER — SEMAGLUTIDE 0.25 MG/.5ML
0.25 INJECTION, SOLUTION SUBCUTANEOUS
Qty: 2 ML | Refills: 1 | Status: SHIPPED | OUTPATIENT
Start: 2024-10-29

## 2024-11-01 LAB
1,25(OH)2D SERPL-MCNC: 38 PG/ML (ref 18–72)
1,25(OH)2D2 SERPL-MCNC: <8 PG/ML
1,25(OH)2D3 SERPL-MCNC: 38 PG/ML
ALBUMIN SERPL-MCNC: 4.6 G/DL (ref 3.6–5.1)
ALBUMIN/GLOB SERPL: 1.9 (CALC) (ref 1–2.5)
ALP SERPL-CCNC: 72 U/L (ref 37–153)
ALT SERPL-CCNC: 15 U/L (ref 6–29)
APPEARANCE UR: CLEAR
AST SERPL-CCNC: 15 U/L (ref 10–35)
BACTERIA #/AREA URNS HPF: NORMAL /HPF
BACTERIA UR CULT: NORMAL
BASOPHILS # BLD AUTO: 43 CELLS/UL (ref 0–200)
BASOPHILS NFR BLD AUTO: 0.7 %
BILIRUB SERPL-MCNC: 0.6 MG/DL (ref 0.2–1.2)
BILIRUB UR QL STRIP: NEGATIVE
BUN SERPL-MCNC: 15 MG/DL (ref 7–25)
BUN/CREAT SERPL: NORMAL (CALC) (ref 6–22)
CALCIUM SERPL-MCNC: 9.2 MG/DL (ref 8.6–10.4)
CHLORIDE SERPL-SCNC: 102 MMOL/L (ref 98–110)
CHOLEST SERPL-MCNC: 226 MG/DL
CHOLEST/HDLC SERPL: 2.8 (CALC)
CO2 SERPL-SCNC: 30 MMOL/L (ref 20–32)
COLOR UR: YELLOW
CREAT SERPL-MCNC: 0.85 MG/DL (ref 0.5–1.05)
EGFR: 75 ML/MIN/1.73M2
EOSINOPHIL # BLD AUTO: 250 CELLS/UL (ref 15–500)
EOSINOPHIL NFR BLD AUTO: 4.1 %
ERYTHROCYTE [DISTWIDTH] IN BLOOD BY AUTOMATED COUNT: 12.7 % (ref 11–15)
GLOBULIN SER CALC-MCNC: 2.4 G/DL (CALC) (ref 1.9–3.7)
GLUCOSE SERPL-MCNC: 98 MG/DL (ref 65–99)
GLUCOSE UR QL STRIP: NEGATIVE
HCT VFR BLD AUTO: 44.2 % (ref 35–45)
HDLC SERPL-MCNC: 81 MG/DL
HGB BLD-MCNC: 14.2 G/DL (ref 11.7–15.5)
HGB UR QL STRIP: NEGATIVE
HYALINE CASTS #/AREA URNS LPF: NORMAL /LPF
KETONES UR QL STRIP: NEGATIVE
LDLC SERPL CALC-MCNC: 128 MG/DL (CALC)
LEUKOCYTE ESTERASE UR QL STRIP: NEGATIVE
LYMPHOCYTES # BLD AUTO: 2410 CELLS/UL (ref 850–3900)
LYMPHOCYTES NFR BLD AUTO: 39.5 %
MCH RBC QN AUTO: 30.5 PG (ref 27–33)
MCHC RBC AUTO-ENTMCNC: 32.1 G/DL (ref 32–36)
MCV RBC AUTO: 94.8 FL (ref 80–100)
MONOCYTES # BLD AUTO: 427 CELLS/UL (ref 200–950)
MONOCYTES NFR BLD AUTO: 7 %
NEUTROPHILS # BLD AUTO: 2971 CELLS/UL (ref 1500–7800)
NEUTROPHILS NFR BLD AUTO: 48.7 %
NITRITE UR QL STRIP: NEGATIVE
NONHDLC SERPL-MCNC: 145 MG/DL (CALC)
PH UR STRIP: 6 [PH] (ref 5–8)
PLATELET # BLD AUTO: 333 THOUSAND/UL (ref 140–400)
PMV BLD REES-ECKER: 8.8 FL (ref 7.5–12.5)
POTASSIUM SERPL-SCNC: 4.6 MMOL/L (ref 3.5–5.3)
PROT SERPL-MCNC: 7 G/DL (ref 6.1–8.1)
PROT UR QL STRIP: NEGATIVE
RBC # BLD AUTO: 4.66 MILLION/UL (ref 3.8–5.1)
RBC #/AREA URNS HPF: NORMAL /HPF
SERVICE CMNT-IMP: NORMAL
SODIUM SERPL-SCNC: 139 MMOL/L (ref 135–146)
SP GR UR STRIP: 1.02 (ref 1–1.03)
SQUAMOUS #/AREA URNS HPF: NORMAL /HPF
TRIGL SERPL-MCNC: 77 MG/DL
TSH SERPL-ACNC: 2.05 MIU/L (ref 0.4–4.5)
WBC # BLD AUTO: 6.1 THOUSAND/UL (ref 3.8–10.8)
WBC #/AREA URNS HPF: NORMAL /HPF

## 2024-11-14 ENCOUNTER — OFFICE VISIT (OUTPATIENT)
Dept: ORTHOPEDICS | Facility: CLINIC | Age: 69
End: 2024-11-14
Payer: MEDICARE

## 2024-11-14 ENCOUNTER — HOSPITAL ENCOUNTER (OUTPATIENT)
Dept: RADIOLOGY | Facility: HOSPITAL | Age: 69
Discharge: HOME | End: 2024-11-14
Attending: ORTHOPAEDIC SURGERY
Payer: MEDICARE

## 2024-11-14 VITALS — BODY MASS INDEX: 25.61 KG/M2 | WEIGHT: 150 LBS | HEIGHT: 64 IN

## 2024-11-14 DIAGNOSIS — M22.41 CHONDROMALACIA, PATELLA, RIGHT: Primary | ICD-10-CM

## 2024-11-14 PROCEDURE — 99999 PR PBB SHADOW E&M-EST. PATIENT-LVL III: CPT | Mod: PBBFAC,,, | Performed by: ORTHOPAEDIC SURGERY

## 2024-11-14 PROCEDURE — 73562 X-RAY EXAM OF KNEE 3: CPT | Mod: 26,RT,, | Performed by: RADIOLOGY

## 2024-11-14 PROCEDURE — 73562 X-RAY EXAM OF KNEE 3: CPT | Mod: TC,PN,RT

## 2024-11-14 RX ORDER — TRIAMCINOLONE ACETONIDE 40 MG/ML
40 INJECTION, SUSPENSION INTRA-ARTICULAR; INTRAMUSCULAR
Status: DISCONTINUED | OUTPATIENT
Start: 2024-11-14 | End: 2024-11-14 | Stop reason: HOSPADM

## 2024-11-14 RX ADMIN — TRIAMCINOLONE ACETONIDE 40 MG: 40 INJECTION, SUSPENSION INTRA-ARTICULAR; INTRAMUSCULAR at 09:11

## 2024-11-14 NOTE — PROCEDURES
Large Joint Aspiration/Injection: R knee    Date/Time: 11/14/2024 9:00 AM    Performed by: Pasha Ontiveros MD  Authorized by: Pasha Ontiveros MD    Consent Done?:  Yes (Verbal)  Indications:  Pain  Site marked: the procedure site was marked    Timeout: prior to procedure the correct patient, procedure, and site was verified    Prep: patient was prepped and draped in usual sterile fashion      Local anesthesia used?: Yes    Local anesthetic:  Lidocaine 1% without epinephrine    Details:  Needle Size:  25 G  Ultrasonic Guidance for needle placement?: No    Approach:  Anterolateral  Location:  Knee  Site:  R knee  Medications:  40 mg triamcinolone acetonide 40 mg/mL  Patient tolerance:  Patient tolerated the procedure well with no immediate complications

## 2024-11-14 NOTE — PROGRESS NOTES
[facility]    Subjective:     Chief Complaint:   Chief Complaint   Patient presents with    Right Knee - Pain     Right knee, last injected 9/19/19, which has helped all of these years, pain is off and on, little swelling, no popping nor giving way       Past Medical History:   Diagnosis Date    Allergy     Basal cell carcinoma     Migraine        Past Surgical History:   Procedure Laterality Date    CATHETERIZATION OF BOTH LEFT AND RIGHT HEART Left 10/19/2023    Procedure: CATHETERIZATION, HEART, BOTH LEFT AND RIGHT;  Surgeon: Griffin Sue MD;  Location: [facility] CATH/EP LAB;  Service: Cardiology;  Laterality: Left;    ESOPHAGOGASTRODUODENOSCOPY N/A 5/12/2023    Procedure: EGD (ESOPHAGOGASTRODUODENOSCOPY);  Surgeon: Cruz Whitney MD;  Location: [facility] ENDO;  Service: Endoscopy;  Laterality: N/A;    facial biopsy Right 08/15/2021    right forehead biopsy result basal cell carcinoma    TONSILLECTOMY      TUBAL LIGATION         Current Outpatient Medications   Medication Sig    FLUAD QUAD 2023-24,65Y UP,,PF, 60 mcg (15 mcg x 4)/0.5 mL Syrg      No current facility-administered medications for this visit.       Review of patient's allergies indicates:   Allergen Reactions    Pcn [penicillins] Hives and Nausea Only    Doxycycline Hives, Itching and Rash    Hydrocodone Nausea Only     Only applies to 10mg. Patient can take 5mg       Family History   Problem Relation Name Age of Onset    Breast cancer Mother  42    Cancer Mother  42        breast cancer    Heart disease Father          quadrouple bypass    Heart disease Maternal Grandmother      COPD Maternal Grandfather      Heart disease Paternal Grandmother         Social History     Socioeconomic History    Marital status:    Tobacco Use    Smoking status: Never     Passive exposure: Yes    Smokeless tobacco: Never   Substance and Sexual Activity    Alcohol use: Yes     Comment: 2 glasses of red wine daily    Drug use: No     Social Drivers of  Health     Financial Resource Strain: Low Risk  (10/26/2024)    Overall Financial Resource Strain (CARDIA)     Difficulty of Paying Living Expenses: Not hard at all   Food Insecurity: No Food Insecurity (10/26/2024)    Hunger Vital Sign     Worried About Running Out of Food in the Last Year: Never true     Ran Out of Food in the Last Year: Never true   Transportation Needs: No Transportation Needs (12/4/2023)    PRAPARE - Transportation     Lack of Transportation (Medical): No     Lack of Transportation (Non-Medical): No   Physical Activity: Unknown (10/26/2024)    Exercise Vital Sign     Days of Exercise per Week: Patient declined     Minutes of Exercise per Session: 30 min   Stress: Stress Concern Present (10/26/2024)    [employer] - Occupational Stress Questionnaire     Feeling of Stress : Rather much   Housing Stability: Low Risk  (12/4/2023)    Housing Stability Vital Sign     Unable to Pay for Housing in the Last Year: No     Number of Places Lived in the Last Year: 1     Unstable Housing in the Last Year: No       History of present illness:  Ms. Hernandez comes in today for her right knee.  She has been experiencing right knee pain for about 6 months.  She denies any injury or trauma.  She attributes this to a lot of up and down stair climbing in her new home she is really doing.  She was seen for this once for for about 5 years ago where she received an intra-articular injection and had great relief of her symptoms into recently.    Review of Systems:    Constitution: Negative for chills, fever, and sweats.  Negative for unexplained weight loss.    HENT:  Negative for headaches and blurry vision.    Cardiovascular:Negative for chest pain or irregular heart beat. Negative for hypertension.    Respiratory:  Negative for cough and shortness of breath.    Gastrointestinal: Negative for abdominal pain, heartburn, melena, nausea, and vomitting.    Genitourinary:  Negative bladder  "incontinence and dysuria.    Musculoskeletal:  See HPI for details.     Neurological: Negative for numbness.    Psychiatric/Behavioral: Negative for depression.  The patient is not nervous/anxious.      Endocrine: Negative for polyuria    Hematologic/Lymphatic: Negative for bleeding problem.  Does not bruise/bleed easily.    Skin: Negative for poor would healing and rash    Objective:      Physical Examination:    Vital Signs:  There were no vitals filed for this visit.    Body mass index is 25.75 kg/m².    This a well-developed, well nourished patient in no acute distress.  They are alert and oriented and cooperative to examination.        Right knee exam: Skin to right knee clean dry and intact.  No erythema or ecchymosis.  No signs or symptoms of infection.  Neurovascularly intact throughout the right lower extremity.  Negative Homans on the right.  Right knee range of motion well-preserved at 0-130 degrees.  Knee stable to varus and valgus stresses.  No effusion.  She can weightbear as tolerated right lower extremity.  Nonantalgic gait.  No real medial or lateral joint line tenderness.  Mild patellofemoral grind.      Pertinent New Results:    XRAY Report / Interpretation:   Three views taken of the right knee today: AP, lateral, sunrise views.  No acute fractures dislocations seen.  Mild patellofemoral arthrosis.  Medial and lateral compartment space well-maintained.    Assessment/Plan:      1. Right knee chondromalacia patella.      I injected her right knee today via an anterolateral approach with 40 mg of Kenalog and lidocaine.  She tolerated this well.  We will see her back on an as-needed basis for any recurrence of symptoms.  She can resume/continue her regular activities of daily living as pain tolerates.    Roberto Quach, Physician Assistant, served in the capacity as a "scribe" for this patient encounter.  A "face-to-face" encounter occurred with Dr. Pasha Ontiveros on this date.  The treatment " plan and medical decision-making is outlined above. Patient was seen and examined with a chaperone.         This note was created using Dragon voice recognition software that occasionally misinterpreted phrases or words.

## 2024-12-30 DIAGNOSIS — Z12.31 ENCOUNTER FOR SCREENING MAMMOGRAM FOR MALIGNANT NEOPLASM OF BREAST: Primary | ICD-10-CM

## 2024-12-31 ENCOUNTER — OFFICE VISIT (OUTPATIENT)
Dept: URGENT CARE | Facility: CLINIC | Age: 69
End: 2024-12-31
Payer: MEDICARE

## 2024-12-31 ENCOUNTER — LAB VISIT (OUTPATIENT)
Dept: LAB | Facility: HOSPITAL | Age: 69
End: 2024-12-31
Attending: NURSE PRACTITIONER
Payer: MEDICARE

## 2024-12-31 VITALS
OXYGEN SATURATION: 100 % | BODY MASS INDEX: 25.16 KG/M2 | DIASTOLIC BLOOD PRESSURE: 73 MMHG | HEIGHT: 65 IN | SYSTOLIC BLOOD PRESSURE: 129 MMHG | TEMPERATURE: 98 F | WEIGHT: 151 LBS | HEART RATE: 75 BPM | RESPIRATION RATE: 20 BRPM

## 2024-12-31 DIAGNOSIS — M25.532 LEFT WRIST PAIN: Primary | ICD-10-CM

## 2024-12-31 DIAGNOSIS — E55.9 VITAMIN D DEFICIENCY: Primary | ICD-10-CM

## 2024-12-31 LAB — 25(OH)D3+25(OH)D2 SERPL-MCNC: 38 NG/ML (ref 30–96)

## 2024-12-31 PROCEDURE — 82306 VITAMIN D 25 HYDROXY: CPT | Performed by: NURSE PRACTITIONER

## 2024-12-31 PROCEDURE — 99204 OFFICE O/P NEW MOD 45 MIN: CPT | Mod: S$GLB,,, | Performed by: NURSE PRACTITIONER

## 2024-12-31 PROCEDURE — 36415 COLL VENOUS BLD VENIPUNCTURE: CPT | Mod: PO | Performed by: NURSE PRACTITIONER

## 2024-12-31 NOTE — PROGRESS NOTES
"Subjective:      Patient ID: Elle Hernandez is a 69 y.o. female.    Vitals:  height is 5' 5" (1.651 m) and weight is 68.5 kg (151 lb). Her oral temperature is 97.5 °F (36.4 °C). Her blood pressure is 129/73 and her pulse is 75. Her respiration is 20 and oxygen saturation is 100%.     Chief Complaint: Hand Injury    Elle Hernandez is a 69 year old female presenting to the clinic with c/o swelling to the right thumb and pain in the right wrist. She denies injury/trauma and has taken no medications. She denies numbness/tingling in the hand.     Hand Injury         Constitution: Negative.   HENT: Negative.     Neck: neck negative.   Respiratory: Negative.     Gastrointestinal: Negative.    Genitourinary: Negative.    Musculoskeletal:  Positive for pain (right thumb, right wrist) and joint swelling (right thumb). Negative for trauma and abnormal ROM of joint.      Objective:     Physical Exam   Constitutional: She is oriented to person, place, and time. She appears well-developed. She is cooperative.  Non-toxic appearance. She does not appear ill. No distress.   HENT:   Head: Normocephalic and atraumatic.   Ears:   Right Ear: Hearing and external ear normal.   Left Ear: Hearing and external ear normal.   Nose: Nose normal. No mucosal edema, rhinorrhea or nasal deformity. No epistaxis. Right sinus exhibits no maxillary sinus tenderness and no frontal sinus tenderness. Left sinus exhibits no maxillary sinus tenderness and no frontal sinus tenderness.   Mouth/Throat: Uvula is midline, oropharynx is clear and moist and mucous membranes are normal. No trismus in the jaw. Normal dentition. No uvula swelling. No oropharyngeal exudate, posterior oropharyngeal edema or posterior oropharyngeal erythema.   Eyes: Conjunctivae and lids are normal. No scleral icterus.   Neck: Trachea normal and phonation normal. Neck supple. No edema present. No erythema present. No neck rigidity present.   Cardiovascular: Normal rate, regular " rhythm, normal heart sounds and normal pulses.   Pulmonary/Chest: Effort normal and breath sounds normal. No respiratory distress. She has no decreased breath sounds. She has no rhonchi.   Abdominal: Normal appearance.   Musculoskeletal: Normal range of motion.         General: No deformity. Normal range of motion.        Hands:    Neurological: She is alert and oriented to person, place, and time. She exhibits normal muscle tone. Coordination normal.   Skin: Skin is warm, dry, intact, not diaphoretic and not pale.   Psychiatric: Her speech is normal and behavior is normal. Judgment and thought content normal.   Nursing note and vitals reviewed.      Assessment:     1. Left wrist pain        Plan:     Unsure of etiology of pain/swelling with no history of trauma. There is no warmth or redness to the joint and she has full ROM of all joints of the hand. Will use velcro thumb spica and NSAIDs and have her follow up with PCP or ortho if no improvement.   Left wrist pain  -     THUMB ORTHOSIS SPLINT UNIVERSAL FOR HOME USE

## 2025-02-14 ENCOUNTER — TELEPHONE (OUTPATIENT)
Dept: FAMILY MEDICINE | Facility: CLINIC | Age: 70
End: 2025-02-14
Payer: MEDICARE

## 2025-02-14 ENCOUNTER — PATIENT MESSAGE (OUTPATIENT)
Dept: FAMILY MEDICINE | Facility: CLINIC | Age: 70
End: 2025-02-14
Payer: MEDICARE

## 2025-02-14 NOTE — TELEPHONE ENCOUNTER
----- Message from Erika sent at 2/14/2025 10:20 AM CST -----  Patient is going to see Dr. Singh on Tuesday and asking for any labs that patient has completed.   [phone]

## 2025-02-19 ENCOUNTER — LAB VISIT (OUTPATIENT)
Dept: LAB | Facility: HOSPITAL | Age: 70
End: 2025-02-19
Attending: NURSE PRACTITIONER
Payer: MEDICARE

## 2025-02-19 DIAGNOSIS — R10.9 ABDOMINAL BLOATING WITH CRAMPS: ICD-10-CM

## 2025-02-19 DIAGNOSIS — R19.7 DIARRHEA: Primary | ICD-10-CM

## 2025-02-19 DIAGNOSIS — R14.0 ABDOMINAL BLOATING WITH CRAMPS: ICD-10-CM

## 2025-02-19 LAB — IGA SERPL-MCNC: 210 MG/DL (ref 40–350)

## 2025-02-19 PROCEDURE — 82784 ASSAY IGA/IGD/IGG/IGM EACH: CPT | Performed by: NURSE PRACTITIONER

## 2025-02-19 PROCEDURE — 86364 TISS TRNSGLTMNASE EA IG CLAS: CPT | Performed by: NURSE PRACTITIONER

## 2025-02-19 PROCEDURE — 83993 ASSAY FOR CALPROTECTIN FECAL: CPT | Performed by: NURSE PRACTITIONER

## 2025-02-20 LAB
CALPROTECTIN INTERPRETATION (OHS): NORMAL
CALPROTECTIN: 5
ELASTASE 1, FECAL: 102
ELASTASE INTERPRETATION: ABNORMAL

## 2025-02-25 LAB — TTG IGA SER-ACNC: 0.6 U/ML

## 2025-03-05 DIAGNOSIS — R10.84 DIFFUSE ABDOMINAL PAIN: Primary | ICD-10-CM

## 2025-03-13 ENCOUNTER — HOSPITAL ENCOUNTER (OUTPATIENT)
Dept: RADIOLOGY | Facility: HOSPITAL | Age: 70
Discharge: HOME | End: 2025-03-13
Attending: NURSE PRACTITIONER
Payer: MEDICARE

## 2025-03-13 DIAGNOSIS — R10.84 DIFFUSE ABDOMINAL PAIN: ICD-10-CM

## 2025-03-13 LAB
CREAT SERPL-MCNC: 1 MG/DL (ref 0.5–1.4)
SAMPLE: NORMAL

## 2025-03-13 PROCEDURE — A9698 NON-RAD CONTRAST MATERIALNOC: HCPCS

## 2025-03-13 PROCEDURE — 25500020 PHARM REV CODE 255

## 2025-03-13 PROCEDURE — 74177 CT ABD & PELVIS W/CONTRAST: CPT | Mod: TC

## 2025-03-13 RX ADMIN — IOHEXOL 1000 ML: 9 SOLUTION ORAL at 01:03

## 2025-03-13 RX ADMIN — IOHEXOL 75 ML: 350 INJECTION, SOLUTION INTRAVENOUS at 01:03

## 2025-03-14 DIAGNOSIS — R93.3 ABNORMAL VIRTUAL COLONOSCOPE: Primary | ICD-10-CM

## 2025-03-14 DIAGNOSIS — R93.3 ABNORMAL CT SCAN, GASTROINTESTINAL TRACT: Primary | ICD-10-CM

## 2025-03-14 DIAGNOSIS — R93.3 ABNORMAL CT SCAN, GASTROINTESTINAL TRACT: ICD-10-CM

## 2025-03-24 ENCOUNTER — HOSPITAL ENCOUNTER (OUTPATIENT)
Dept: RADIOLOGY | Facility: HOSPITAL | Age: 70
Discharge: HOME | End: 2025-03-24
Attending: NURSE PRACTITIONER
Payer: COMMERCIAL

## 2025-03-24 DIAGNOSIS — R93.3 ABNORMAL CT SCAN, GASTROINTESTINAL TRACT: ICD-10-CM

## 2025-03-24 PROCEDURE — 74170 CT ABD WO CNTRST FLWD CNTRST: CPT | Mod: 26,,, | Performed by: RADIOLOGY

## 2025-03-24 PROCEDURE — 74170 CT ABD WO CNTRST FLWD CNTRST: CPT | Mod: TC,PO

## 2025-03-24 PROCEDURE — 25500020 PHARM REV CODE 255: Mod: PO | Performed by: NURSE PRACTITIONER

## 2025-03-24 RX ADMIN — IOHEXOL 100 ML: 350 INJECTION, SOLUTION INTRAVENOUS at 01:03

## 2025-03-26 ENCOUNTER — PATIENT MESSAGE (OUTPATIENT)
Dept: ORTHOPEDICS | Facility: CLINIC | Age: 70
End: 2025-03-26
Payer: COMMERCIAL

## 2025-03-27 DIAGNOSIS — R93.3 ABNORMAL CT SCAN, GASTROINTESTINAL TRACT: Primary | ICD-10-CM

## 2025-03-28 ENCOUNTER — HOSPITAL ENCOUNTER (OUTPATIENT)
Dept: RADIOLOGY | Facility: HOSPITAL | Age: 70
Discharge: HOME | End: 2025-03-28
Attending: NURSE PRACTITIONER
Payer: MEDICARE

## 2025-03-28 DIAGNOSIS — R93.3 ABNORMAL CT SCAN, GASTROINTESTINAL TRACT: ICD-10-CM

## 2025-03-28 PROCEDURE — 76700 US EXAM ABDOM COMPLETE: CPT | Mod: 26,,, | Performed by: RADIOLOGY

## 2025-03-28 PROCEDURE — 76700 US EXAM ABDOM COMPLETE: CPT | Mod: TC

## 2025-04-01 ENCOUNTER — PATIENT MESSAGE (OUTPATIENT)
Dept: FAMILY MEDICINE | Facility: CLINIC | Age: 70
End: 2025-04-01
Payer: MEDICARE

## 2025-04-08 ENCOUNTER — OFFICE VISIT (OUTPATIENT)
Dept: ORTHOPEDICS | Facility: CLINIC | Age: 70
End: 2025-04-08
Payer: MEDICARE

## 2025-04-08 ENCOUNTER — HOSPITAL ENCOUNTER (OUTPATIENT)
Dept: RADIOLOGY | Facility: HOSPITAL | Age: 70
Discharge: HOME | End: 2025-04-08
Attending: ORTHOPAEDIC SURGERY
Payer: MEDICARE

## 2025-04-08 VITALS
BODY MASS INDEX: 24.83 KG/M2 | DIASTOLIC BLOOD PRESSURE: 66 MMHG | WEIGHT: 149 LBS | HEIGHT: 65 IN | SYSTOLIC BLOOD PRESSURE: 130 MMHG

## 2025-04-08 DIAGNOSIS — M22.41 CHONDROMALACIA, PATELLA, RIGHT: ICD-10-CM

## 2025-04-08 DIAGNOSIS — M18.11 OSTEOARTHRITIS OF RIGHT THUMB: ICD-10-CM

## 2025-04-08 DIAGNOSIS — M67.40 MUCOID CYST OF JOINT: Primary | ICD-10-CM

## 2025-04-08 DIAGNOSIS — M19.041: ICD-10-CM

## 2025-04-08 PROCEDURE — 99999 PR PBB SHADOW E&M-EST. PATIENT-LVL III: CPT | Mod: PBBFAC,,, | Performed by: ORTHOPAEDIC SURGERY

## 2025-04-08 PROCEDURE — 99999PBSHW PR PBB SHADOW TECHNICAL ONLY FILED TO HB: Mod: PBBFAC,,,

## 2025-04-08 PROCEDURE — 99213 OFFICE O/P EST LOW 20 MIN: CPT | Mod: PBBFAC,25,PN | Performed by: ORTHOPAEDIC SURGERY

## 2025-04-08 PROCEDURE — 20610 DRAIN/INJ JOINT/BURSA W/O US: CPT | Mod: PBBFAC,PN,RT | Performed by: ORTHOPAEDIC SURGERY

## 2025-04-08 PROCEDURE — 73130 X-RAY EXAM OF HAND: CPT | Mod: 26,RT,, | Performed by: RADIOLOGY

## 2025-04-08 PROCEDURE — 73130 X-RAY EXAM OF HAND: CPT | Mod: TC,PN,RT

## 2025-04-08 RX ORDER — TRIAMCINOLONE ACETONIDE 40 MG/ML
40 INJECTION, SUSPENSION INTRA-ARTICULAR; INTRAMUSCULAR
Status: DISCONTINUED | OUTPATIENT
Start: 2025-04-08 | End: 2025-04-08 | Stop reason: HOSPADM

## 2025-04-08 RX ADMIN — TRIAMCINOLONE ACETONIDE 40 MG: 40 INJECTION, SUSPENSION INTRA-ARTICULAR; INTRAMUSCULAR at 10:04

## 2025-04-08 NOTE — PROCEDURES
Large Joint Aspiration/Injection: R knee    Date/Time: 4/8/2025 10:30 AM    Performed by: Pasha Ontiveros MD  Authorized by: Pasha Ontiveros MD    Consent Done?:  Yes (Verbal)  Indications:  Pain  Site marked: the procedure site was marked    Timeout: prior to procedure the correct patient, procedure, and site was verified    Prep: patient was prepped and draped in usual sterile fashion      Local anesthesia used?: Yes    Local anesthetic:  Lidocaine 1% without epinephrine    Details:  Needle Size:  25 G  Ultrasonic Guidance for needle placement?: No    Location:  Knee  Site:  R knee  Medications:  40 mg triamcinolone acetonide 40 mg/mL  Patient tolerance:  Patient tolerated the procedure well with no immediate complications

## 2025-04-08 NOTE — PROGRESS NOTES
[facility]    Subjective:     Chief Complaint:   Chief Complaint   Patient presents with    Right Hand - Pain     Right hand pain x 2 monhts , at thumb noticed lump, pain was progressing, she went to Urgent Care, was told to hit it with the Bible, and then started at little finger as well,     Right Knee - Pain     Right knee last injected 11/12/24, the injection did help but not nearly as long as the injection before, the knee pain is off and on, c/o giving way, little swelling, would like injection today       Past Medical History:   Diagnosis Date    Allergy     Basal cell carcinoma     Migraine        Past Surgical History:   Procedure Laterality Date    CATHETERIZATION OF BOTH LEFT AND RIGHT HEART Left 10/19/2023    Procedure: CATHETERIZATION, HEART, BOTH LEFT AND RIGHT;  Surgeon: Griffin Sue MD;  Location: [facility] [place] CATH/EP LAB;  Service: Cardiology;  Laterality: Left;    ESOPHAGOGASTRODUODENOSCOPY N/A 5/12/2023    Procedure: EGD (ESOPHAGOGASTRODUODENOSCOPY);  Surgeon: Cruz Whitney MD;  Location: [facility] [place] ENDO;  Service: Endoscopy;  Laterality: N/A;    facial biopsy Right 08/15/2021    right forehead biopsy result basal cell carcinoma    TONSILLECTOMY      TUBAL LIGATION         Current Outpatient Medications   Medication Sig    FLUAD QUAD 2023-24,65Y UP,,PF, 60 mcg (15 mcg x 4)/0.5 mL Syrg      No current facility-administered medications for this visit.       Review of patient's allergies indicates:   Allergen Reactions    Pcn [penicillins] Hives and Nausea Only    Doxycycline Hives, Itching and Rash       Family History   Problem Relation Name Age of Onset    Breast cancer Mother  42    Cancer Mother  42        breast cancer    Heart disease Father          quadrouple bypass    Heart disease Maternal Grandmother      COPD Maternal Grandfather      Heart disease Paternal Grandmother         Social History[1]    History of present illness:  Ms. Hernandez comes in today with concerns of some  swelling/nodules in the right hand on the thumb and pinky fingers.  She is right-hand dominant.  Denies any injury or trauma.  She is also requesting a repeat injection in the right knee.  Her last injection was about 5 months ago.  Denies any new injury or trauma.  The pain has recurred here over the past couple of weeks.    Review of Systems:    Constitution: Negative for chills, fever, and sweats.  Negative for unexplained weight loss.    HENT:  Negative for headaches and blurry vision.    Cardiovascular:Negative for chest pain or irregular heart beat. Negative for hypertension.    Respiratory:  Negative for cough and shortness of breath.    Gastrointestinal: Negative for abdominal pain, heartburn, melena, nausea, and vomitting.    Genitourinary:  Negative bladder incontinence and dysuria.    Musculoskeletal:  See HPI for details.     Neurological: Negative for numbness.    Psychiatric/Behavioral: Negative for depression.  The patient is not nervous/anxious.      Endocrine: Negative for polyuria    Hematologic/Lymphatic: Negative for bleeding problem.  Does not bruise/bleed easily.    Skin: Negative for poor would healing and rash    Objective:      Physical Examination:    Vital Signs:    Vitals:    04/08/25 1043   BP: 130/66       Body mass index is 24.79 kg/m².    This a well-developed, well nourished patient in no acute distress.  They are alert and oriented and cooperative to examination.        Right hand exam: Skin to right hand clean dry and intact.  No erythema or ecchymosis.  No signs or symptoms of infection.  Neurovascularly intact throughout the right upper extremity.  She can open and close right hand into a fist.  She can oppose her right thumb to all digits in the right hand.  She does have mucinous cyst on the dorsum of the right thumb IP joint and less so over the dorsum of the right pinky PIP and DIP joints.    Pertinent New Results:    XRAY Report / Interpretation:   Three views taken of the  "right hand today: AP, lateral, and oblique views.  No acute fractures or dislocations seen.  Mild degenerative change in the right thumb IP joint the less so in the right pinky PIP and DIP joints.    Assessment/Plan:      1. Right thumb and pinky finger osteoarthritis.    2. Right knee chondromalacia patella.    At her request, Dr. Ontiveros injected the right knee today via an anterolateral approach with 40 mg of Kenalog and lidocaine.  She tolerated this well.  In regards to the right thumb and pinky finger use in his cyst, no particular intervention is warranted.  They are not particularly limiting in her ADLs.  Advised her just to monitor them.  If they do become painful or unsightly, and it can be excised.    Roberto Quach, Physician Assistant, served in the capacity as a "scribe" for this patient encounter.  A "face-to-face" encounter occurred with Dr. Pasha Ontiveros on this date.  The treatment plan and medical decision-making is outlined above. Patient was seen and examined with a chaperone.       This note was created using Dragon voice recognition software that occasionally misinterpreted phrases or words.               [1]   Social History  Socioeconomic History    Marital status:    Tobacco Use    Smoking status: Never     Passive exposure: Yes    Smokeless tobacco: Never   Substance and Sexual Activity    Alcohol use: Yes     Comment: 2 glasses of red wine daily    Drug use: No     Social Drivers of Health     Financial Resource Strain: Low Risk  (10/26/2024)    Overall Financial Resource Strain (CARDIA)     Difficulty of Paying Living Expenses: Not hard at all   Food Insecurity: No Food Insecurity (10/26/2024)    Hunger Vital Sign     Worried About Running Out of Food in the Last Year: Never true     Ran Out of Food in the Last Year: Never true   Transportation Needs: No Transportation Needs (12/4/2023)    PRAPARE - Transportation     Lack of Transportation (Medical): No     Lack of " Transportation (Non-Medical): No   Physical Activity: Unknown (10/26/2024)    Exercise Vital Sign     Days of Exercise per Week: Patient declined     Minutes of Exercise per Session: 30 min   Stress: Stress Concern Present (10/26/2024)    [employer] - Occupational Stress Questionnaire     Feeling of Stress : Rather much   Housing Stability: Low Risk  (12/4/2023)    Housing Stability Vital Sign     Unable to Pay for Housing in the Last Year: No     Number of Places Lived in the Last Year: 1     Unstable Housing in the Last Year: No

## 2025-06-25 ENCOUNTER — HOSPITAL ENCOUNTER (OUTPATIENT)
Dept: RADIOLOGY | Facility: HOSPITAL | Age: 70
Discharge: HOME | End: 2025-06-25
Attending: SPECIALIST
Payer: MEDICARE

## 2025-06-25 DIAGNOSIS — Z12.31 ENCOUNTER FOR SCREENING MAMMOGRAM FOR MALIGNANT NEOPLASM OF BREAST: ICD-10-CM

## 2025-06-25 PROCEDURE — 77067 SCR MAMMO BI INCL CAD: CPT | Mod: 26,,, | Performed by: RADIOLOGY

## 2025-06-25 PROCEDURE — 77063 BREAST TOMOSYNTHESIS BI: CPT | Mod: TC,PO

## 2025-06-25 PROCEDURE — 77063 BREAST TOMOSYNTHESIS BI: CPT | Mod: 26,,, | Performed by: RADIOLOGY

## 2025-08-19 ENCOUNTER — TELEPHONE (OUTPATIENT)
Dept: FAMILY MEDICINE | Facility: CLINIC | Age: 70
End: 2025-08-19
Payer: MEDICARE

## 2025-08-19 DIAGNOSIS — E78.2 MIXED HYPERLIPIDEMIA: ICD-10-CM

## 2025-08-19 DIAGNOSIS — Z79.899 ENCOUNTER FOR LONG-TERM (CURRENT) USE OF MEDICATIONS: Primary | ICD-10-CM

## 2025-09-04 ENCOUNTER — OFFICE VISIT (OUTPATIENT)
Dept: FAMILY MEDICINE | Facility: CLINIC | Age: 70
End: 2025-09-04
Payer: MEDICARE

## 2025-09-04 VITALS
HEART RATE: 94 BPM | HEIGHT: 65 IN | DIASTOLIC BLOOD PRESSURE: 82 MMHG | BODY MASS INDEX: 24.72 KG/M2 | OXYGEN SATURATION: 100 % | WEIGHT: 148.38 LBS | SYSTOLIC BLOOD PRESSURE: 128 MMHG

## 2025-09-04 DIAGNOSIS — N95.1 HOT FLASHES DUE TO MENOPAUSE: ICD-10-CM

## 2025-09-04 DIAGNOSIS — Z78.0 MENOPAUSE: ICD-10-CM

## 2025-09-04 DIAGNOSIS — G45.9 TRANSIENT ISCHEMIC ATTACK: ICD-10-CM

## 2025-09-04 DIAGNOSIS — G43.109 COMPLICATED MIGRAINE: ICD-10-CM

## 2025-09-04 DIAGNOSIS — K21.9 GASTROESOPHAGEAL REFLUX DISEASE WITHOUT ESOPHAGITIS: ICD-10-CM

## 2025-09-04 DIAGNOSIS — Z13.820 SCREENING FOR OSTEOPOROSIS: ICD-10-CM

## 2025-09-04 DIAGNOSIS — R41.82 ALTERED MENTAL STATUS, UNSPECIFIED ALTERED MENTAL STATUS TYPE: Primary | ICD-10-CM

## 2025-09-04 DIAGNOSIS — F51.01 PRIMARY INSOMNIA: ICD-10-CM

## 2025-09-04 DIAGNOSIS — Z13.820 ENCOUNTER FOR IMAGING TO ASSESS OSTEOPOROSIS: ICD-10-CM

## 2025-09-04 DIAGNOSIS — G45.9 TIA (TRANSIENT ISCHEMIC ATTACK): ICD-10-CM

## 2025-09-04 DIAGNOSIS — E78.2 MIXED HYPERLIPIDEMIA: ICD-10-CM

## 2025-09-06 PROBLEM — Z78.0 MENOPAUSE: Status: ACTIVE | Noted: 2025-09-06

## (undated) DEVICE — SHEATH INTRODUCER SLENDER 5FX10CM

## (undated) DEVICE — CATHETER DIAGNOSTIC DXTERITY 5FR JR4.0

## (undated) DEVICE — SHEATH INTRODUCER SLENDER 6FX10CM

## (undated) DEVICE — TR BAND

## (undated) DEVICE — Device

## (undated) DEVICE — CATHETER DIAGNOSTIC DXTERITY 5FR JL3.5

## (undated) DEVICE — GUIDEWIRE J TIP EXCHANGE FIXED CORE 260